# Patient Record
Sex: MALE | Race: WHITE | NOT HISPANIC OR LATINO | Employment: OTHER | URBAN - METROPOLITAN AREA
[De-identification: names, ages, dates, MRNs, and addresses within clinical notes are randomized per-mention and may not be internally consistent; named-entity substitution may affect disease eponyms.]

---

## 2017-01-03 ENCOUNTER — TELEPHONE (OUTPATIENT)
Dept: ELECTROPHYSIOLOGY | Facility: CLINIC | Age: 82
End: 2017-01-03

## 2017-01-03 NOTE — TELEPHONE ENCOUNTER
----- Message from Ann Crews sent at 12/28/2016  4:00 PM CST -----  Contact: Daughter of pt called   Daughter of pt called(Prema), requesting to be seen on a Friday. She states she will like to speak with you at ph [phone]. Thank you

## 2017-01-04 ENCOUNTER — TELEPHONE (OUTPATIENT)
Dept: ELECTROPHYSIOLOGY | Facility: CLINIC | Age: 82
End: 2017-01-04

## 2017-01-04 NOTE — TELEPHONE ENCOUNTER
----- Message from Ann Crews sent at 1/4/2017 11:27 AM CST -----  Contact: Daughter of pt  Daughter of pt called(Prema), requesting to be seen on a Friday. She states she will like to speak with you at ph [phone]. She states it is an annual and will like to speak with you.Thank you

## 2017-01-04 NOTE — TELEPHONE ENCOUNTER
I spoke with pt'a daughter, Prema, and informed her that Dr. Green will no longer be in clinic at this location on a Friday.  I offered an appt with VALENTINA Brand and she said that she had to check with her mother and when they were ready they will call back to reschedule.

## 2017-01-27 ENCOUNTER — OFFICE VISIT (OUTPATIENT)
Dept: INTERNAL MEDICINE | Facility: CLINIC | Age: 82
End: 2017-01-27
Payer: COMMERCIAL

## 2017-01-27 ENCOUNTER — CLINICAL SUPPORT (OUTPATIENT)
Dept: INTERNAL MEDICINE | Facility: CLINIC | Age: 82
End: 2017-01-27
Payer: COMMERCIAL

## 2017-01-27 VITALS
WEIGHT: 156.75 LBS | SYSTOLIC BLOOD PRESSURE: 122 MMHG | DIASTOLIC BLOOD PRESSURE: 84 MMHG | BODY MASS INDEX: 23.22 KG/M2 | HEIGHT: 69 IN | HEART RATE: 108 BPM

## 2017-01-27 DIAGNOSIS — E53.8 B12 DEFICIENCY: Primary | ICD-10-CM

## 2017-01-27 DIAGNOSIS — I49.5 SICK SINUS SYNDROME: ICD-10-CM

## 2017-01-27 DIAGNOSIS — Z00.00 ANNUAL PHYSICAL EXAM: ICD-10-CM

## 2017-01-27 DIAGNOSIS — I48.91 ATRIAL FIBRILLATION, UNSPECIFIED TYPE: Primary | ICD-10-CM

## 2017-01-27 PROCEDURE — 99999 PR PBB SHADOW E&M-EST. PATIENT-LVL IV: CPT | Mod: PBBFAC,,, | Performed by: FAMILY MEDICINE

## 2017-01-27 PROCEDURE — 96372 THER/PROPH/DIAG INJ SC/IM: CPT | Mod: S$GLB,,, | Performed by: FAMILY MEDICINE

## 2017-01-27 PROCEDURE — 99397 PER PM REEVAL EST PAT 65+ YR: CPT | Mod: 25,S$GLB,, | Performed by: FAMILY MEDICINE

## 2017-01-27 RX ORDER — CYANOCOBALAMIN 1000 UG/ML
1000 INJECTION, SOLUTION INTRAMUSCULAR; SUBCUTANEOUS
Status: DISCONTINUED | OUTPATIENT
Start: 2017-01-27 | End: 2017-03-21 | Stop reason: SDUPTHER

## 2017-01-27 RX ADMIN — CYANOCOBALAMIN 1000 MCG: 1000 INJECTION, SOLUTION INTRAMUSCULAR; SUBCUTANEOUS at 01:01

## 2017-01-27 NOTE — MR AVS SNAPSHOT
[facility]  [address]  [facility] [address]  Phone: [phone]  Fax: [phone]                  Migue Fraser   2017 11:00 AM   Office Visit    Description:  Male : 1925   Provider:  Iggy Telles MD   Department:  [facility]           Reason for Visit     Follow-up           Diagnoses this Visit        Comments    Atrial fibrillation, unspecified type    -  Primary     Sick sinus syndrome         Annual physical exam                To Do List           Future Appointments        Provider Department Dept Phone    2/10/2017 10:45 AM [facility]ENT[facility] [phone]      Goals (5 Years of Data)     None      Follow-Up and Disposition     Return in about 6 months (around 2017) for f/u.      [facility]     [facility] On [facility] Nurse Care Line -  Assistance  Registered nurses in the [facility] Center provide clinical advisement, health education, appointment booking, and other advisory services.  Call for this free service at [phone].             Medications           Message regarding Medications     Verify the changes and/or additions to your medication regime listed below are the same as discussed with your clinician today.  If any of these changes or additions are incorrect, please notify your healthcare provider.             Verify that the below list of medications is an accurate representation of the medications you are currently taking.  If none reported, the list may be blank. If incorrect, please contact your healthcare provider. Carry this list with you in case of emergency.           Current Medications     apixaban 5 mg Tab Take 1 tablet (5 mg total) by mouth 2 (two) times daily.    baclofen (LIORESAL) 10 MG tablet take 1/2 tablet by mouth three times a day if needed for muscle spasm    escitalopram oxalate (LEXAPRO) 10 MG tablet Take one tablet daily    escitalopram oxalate  "(LEXAPRO) 20 MG tablet Take 1 tablet (20 mg total) by mouth once daily.    fluticasone (FLONASE) 50 mcg/actuation nasal spray 1 spray by Nasal route once daily.    latanoprost 0.005 % ophthalmic solution 1 drop every evening.    metoprolol succinate (TOPROL-XL) 25 MG 24 hr tablet Take 25 mg by mouth once daily.     timolol maleate 0.5% (TIMOPTIC) 0.5 % Drop Place 1 drop into both eyes 2 (two) times daily.    tramadol (ULTRAM) 50 mg tablet Take 50 mg by mouth every 6 (six) hours as needed.           Clinical Reference Information           Vital Signs - Last Recorded  Most recent update: 1/27/2017 11:18 AM by Ruiz Newton MA    BP Pulse Ht Wt BMI    122/84 108 5' 9" (1.753 m) 71.1 kg (156 lb 12 oz) 23.15 kg/m2      Blood Pressure          Most Recent Value    BP  122/84      Allergies as of 1/27/2017     Penicillins      Immunizations Administered on Date of Encounter - 1/27/2017     None      Orders Placed During Today's Visit      Normal Orders This Visit    Ambulatory consult to Electrophysiology     Future Labs/Procedures Expected by Expires    CBC auto differential  1/27/2017 1/27/2018    Comprehensive metabolic panel  1/27/2017 1/27/2018    Vitamin B12  1/27/2017 3/28/2018      [facility] Sign-Up     Activating your [facility] account is as easy as 1-2-3!     1) Visit [email or web address], select Sign Up Now, enter this activation code and your date of birth, then select Next.  [email or web address]  Expires: 3/13/2017 11:38 AM      2) Create a username and password to use when you visit [facility] in the future and select a security question in case you lose your password and select Next.    3) Enter your e-mail address and click Sign Up!    Additional Information  If you have questions, please e-mail [email or web address] or call [phone] to talk to our [facility] staff. Remember, [facility] is NOT to be used for urgent needs. For medical emergencies, dial 911.         "

## 2017-01-27 NOTE — PROGRESS NOTES
Subjective:       Patient ID: Migue Fraser is a 89 y.o. male.    Chief Complaint: Follow-up    HPI Comments: Patient is here for follow-up of their chronic diseases, see last note for details  Doing well, no complaints    Review of Systems   Constitutional: Negative for chills and fatigue.   HENT: Negative for ear pain.    Eyes: Negative for pain.   Respiratory: Negative for chest tightness.    Cardiovascular: Negative for chest pain.   Gastrointestinal: Negative for abdominal pain.   Genitourinary: Negative for flank pain.   Musculoskeletal: Negative for gait problem.   Neurological: Negative for syncope.   Psychiatric/Behavioral: Negative for behavioral problems.       Objective:      Physical Exam   Constitutional: He appears well-developed.   HENT:   Head: Normocephalic and atraumatic.   Eyes: EOM are normal.   Neck: Neck supple.   Cardiovascular: Normal rate.    ireg ireg   Pulmonary/Chest: Breath sounds normal. No respiratory distress. He has no wheezes. He has no rales.   Abdominal: Soft.   Musculoskeletal: He exhibits no edema.   Neurological: He is alert.   Skin: Skin is dry.   Psychiatric: His behavior is normal.       Assessment:       1. Atrial fibrillation, unspecified type    2. Sick sinus syndrome    3. Annual physical exam        Plan:       Migue was seen today for follow-up.    Diagnoses and all orders for this visit:    Atrial fibrillation, unspecified type  -     Ambulatory consult to Electrophysiology - f/u with them every Feb + prn  -     CBC auto differential; Future    Sick sinus syndrome  -     Ambulatory consult to Electrophysiology    Annual physical exam  -     CBC auto differential; Future  -     Comprehensive metabolic panel; Future  -     Vitamin B12; Future    F/u q 6 mo with me

## 2017-02-10 ENCOUNTER — OFFICE VISIT (OUTPATIENT)
Dept: NEUROLOGY | Facility: CLINIC | Age: 82
End: 2017-02-10
Payer: COMMERCIAL

## 2017-02-10 ENCOUNTER — TELEPHONE (OUTPATIENT)
Dept: NEUROLOGY | Facility: CLINIC | Age: 82
End: 2017-02-10

## 2017-02-10 VITALS
DIASTOLIC BLOOD PRESSURE: 68 MMHG | BODY MASS INDEX: 23.09 KG/M2 | HEIGHT: 69 IN | WEIGHT: 155.88 LBS | SYSTOLIC BLOOD PRESSURE: 132 MMHG | HEART RATE: 47 BPM

## 2017-02-10 DIAGNOSIS — F01.518 VASCULAR DEMENTIA WITH BEHAVIOR DISTURBANCE: Primary | ICD-10-CM

## 2017-02-10 PROCEDURE — 99999 PR PBB SHADOW E&M-EST. PATIENT-LVL III: CPT | Mod: PBBFAC,,, | Performed by: NURSE PRACTITIONER

## 2017-02-10 PROCEDURE — 99215 OFFICE O/P EST HI 40 MIN: CPT | Mod: S$GLB,,, | Performed by: NURSE PRACTITIONER

## 2017-02-10 PROCEDURE — 1159F MED LIST DOCD IN RCRD: CPT | Mod: S$GLB,,, | Performed by: NURSE PRACTITIONER

## 2017-02-10 PROCEDURE — 1157F ADVNC CARE PLAN IN RCRD: CPT | Mod: S$GLB,,, | Performed by: NURSE PRACTITIONER

## 2017-02-10 PROCEDURE — 1126F AMNT PAIN NOTED NONE PRSNT: CPT | Mod: S$GLB,,, | Performed by: NURSE PRACTITIONER

## 2017-02-10 PROCEDURE — 1160F RVW MEDS BY RX/DR IN RCRD: CPT | Mod: S$GLB,,, | Performed by: NURSE PRACTITIONER

## 2017-02-10 NOTE — PROGRESS NOTES
Name: Migue Fraser  MRN: [id number]   CSN: [id number]      Date: 02/10/2017    Referring physician:  Iggy Telles MD  [address] [place] [address]    Chief Complaint / Interval History: memory    History of Present Illness (HPI):    89 yo male with dementia, possible vascular dementia, last seen in office by Dr. Dumont, accompanied by wife and daughter.     Sleep is good  Appetite is good. No sisues swallowing or choking.   When asked about his mood, states he is very happy and thinks his memory is pretty good. He says he thinks he still works. Wife shakes her head that he does not still work.     Lexapro did help hypersexual thoughts. Still occurs but not as often. He is no longer waking her up. She has hired a sitter 3 days/week - stays 4 hours at a time. Has been very helpful for his wife! She is doing much better, not as fatigued. She is not well herself. Daughters are also coming in a day per week when sitter is not there. He will occasionally urinate on the floor. Has refused Depends. Will occasionally need some direction on how to get to room in his home.   Will dress himself but not always appropriate. Came down the other day and said he knew something was missing because he was cold. He had shirt and underwear on but no pants. He is able to feed himself.     Nonmotor/Premotor ROS:  Dysphagia (HENT)?No  RBD/sleep issues (Constitutional)?No  Depression/anxiety (Psychiatric)?No  Fatigue (Constitutional)?No    Falls (Musculoskeletal)?No  Cognitive impairment (Neurologic)?Yes  Psychoses (Psychiatric)?No  Pain/Paresthesia (Neurologic)?No      Past Medical History: The patient  has a past medical history of A-fib; Atrial fibrillation; Blastomycosis; Glaucoma; and Sick sinus syndrome.    Social History: The patient  reports that he has never smoked. He does not have any smokeless tobacco history on file. He reports that he drinks alcohol.    Family History: Their family history is not on  "file.    Allergies: Penicillins     Meds:   Current Outpatient Prescriptions on File Prior to Visit   Medication Sig Dispense Refill    apixaban 5 mg Tab Take 1 tablet (5 mg total) by mouth 2 (two) times daily. 180 tablet 3    baclofen (LIORESAL) 10 MG tablet take 1/2 tablet by mouth three times a day if needed for muscle spasm 15 tablet 0    escitalopram oxalate (LEXAPRO) 20 MG tablet Take 1 tablet (20 mg total) by mouth once daily. 30 tablet 11    fluticasone (FLONASE) 50 mcg/actuation nasal spray 1 spray by Nasal route once daily.      latanoprost 0.005 % ophthalmic solution 1 drop every evening.      metoprolol succinate (TOPROL-XL) 25 MG 24 hr tablet Take 25 mg by mouth once daily.   0    timolol maleate 0.5% (TIMOPTIC) 0.5 % Drop Place 1 drop into both eyes 2 (two) times daily.  1    tramadol (ULTRAM) 50 mg tablet Take 50 mg by mouth every 6 (six) hours as needed.  0    escitalopram oxalate (LEXAPRO) 10 MG tablet Take one tablet daily 1 tablet 11     Current Facility-Administered Medications on File Prior to Visit   Medication Dose Route Frequency Provider Last Rate Last Dose    cyanocobalamin injection 1,000 mcg  1,000 mcg Intramuscular Q30 Days Leslie Perdomo NP   1,000 mcg at 12/02/16 1022    cyanocobalamin injection 1,000 mcg  1,000 mcg Intramuscular Q30 Days Iggy Telles MD   1,000 mcg at 01/27/17 1306       Exam:  Visit Vitals    /68 (BP Location: Left arm, Patient Position: Sitting, BP Method: Automatic)    Pulse (!) 47    Ht 5' 9" (1.753 m)    Wt 70.7 kg (155 lb 13.8 oz)    BMI 23.02 kg/m2       Constitutional  Well-developed, well-nourished, appears stated age. Smiles throughout visit. Calm, cooperative.    Neurological    * Mental status  MOCA = deferred     - Orientation  Oriented to person,wife and daughter.      - Memory   Attempts to talk about his time as a . 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 He is quite talkative today but " conversation often will detour to a different topic entirely.      - Attention/concentration  Attentive  during exam   * Gait  WC   * Specialized movement exam  No hypophonic speech.    No facial masking.   No cogwheel rigidity.     No bradykinesia.   No tremor with rest, posture, kinesis, or intention.    No other dystonia, chorea, athetosis, myoclonus, or tics.       Laboratory/Radiological:  - Results:  Lab Visit on 01/27/2017   Component Date Value Ref Range Status    WBC 01/27/2017 4.26  3.90 - 12.70 K/uL Final    RBC 01/27/2017 3.72* 4.60 - 6.20 M/uL Final    Hemoglobin 01/27/2017 12.9* 14.0 - 18.0 g/dL Final    Hematocrit 01/27/2017 39.1* 40.0 - 54.0 % Final    MCV 01/27/2017 105* 82 - 98 fL Final    MCH 01/27/2017 34.7* 27.0 - 31.0 pg Final    MCHC 01/27/2017 33.0  32.0 - 36.0 % Final    RDW 01/27/2017 13.0  11.5 - 14.5 % Final    Platelets 01/27/2017 158  150 - 350 K/uL Final    MPV 01/27/2017 10.4  9.2 - 12.9 fL Final    Gran # 01/27/2017 2.3  1.8 - 7.7 K/uL Final    Lymph # 01/27/2017 1.4  1.0 - 4.8 K/uL Final    Mono # 01/27/2017 0.5  0.3 - 1.0 K/uL Final    Eos # 01/27/2017 0.1  0.0 - 0.5 K/uL Final    Baso # 01/27/2017 0.03  0.00 - 0.20 K/uL Final    Gran% 01/27/2017 52.8  38.0 - 73.0 % Final    Lymph% 01/27/2017 32.2  18.0 - 48.0 % Final    Mono% 01/27/2017 10.8  4.0 - 15.0 % Final    Eosinophil% 01/27/2017 3.3  0.0 - 8.0 % Final    Basophil% 01/27/2017 0.7  0.0 - 1.9 % Final    Differential Method 01/27/2017 Automated   Final    Sodium 01/27/2017 140  136 - 145 mmol/L Final    Potassium 01/27/2017 4.6  3.5 - 5.1 mmol/L Final    Chloride 01/27/2017 102  95 - 110 mmol/L Final    CO2 01/27/2017 29  23 - 29 mmol/L Final    Glucose 01/27/2017 101  70 - 110 mg/dL Final    BUN, Bld 01/27/2017 16  10 - 30 mg/dL Final    Creatinine 01/27/2017 1.0  0.5 - 1.4 mg/dL Final    Calcium 01/27/2017 9.3  8.7 - 10.5 mg/dL Final    Total Protein 01/27/2017 7.0  6.0 - 8.4 g/dL Final     Albumin 01/27/2017 4.2  3.5 - 5.2 g/dL Final    Total Bilirubin 01/27/2017 0.7  0.1 - 1.0 mg/dL Final    Alkaline Phosphatase 01/27/2017 60  55 - 135 U/L Final    AST 01/27/2017 17  10 - 40 U/L Final    ALT 01/27/2017 12  10 - 44 U/L Final    Anion Gap 01/27/2017 9  8 - 16 mmol/L Final    eGFR if African American 01/27/2017 >60  >60 mL/min/1.73 m^2 Final    eGFR if non African American 01/27/2017 >60  >60 mL/min/1.73 m^2 Final    Vitamin B-12 01/27/2017 >2000* 210 - 950 pg/mL Final       Diagnoses:          Dementia, vascular- does have some features of AD as well    Medical Decision Making:    For now, will continue meds without change.   Watch libido! If this increases or becomes problematic, wife will call.   Will have to be cautious with any meds due to age.   Call for problems.  Follow up 4 months. Requests Friday appt.     I spent 40  minutes face-to-face with the patient, daughter and wife with >80% of the time spent with counseling and education regarding:  - results of data, diagnosis, and recommendations stated above  - the prognosis of dementia and common occurrences and behaviors in this type of process      Leslie Perdomo, DNP, NP-C  Division of Movement and Memory Disorders  [facility]  [phone]

## 2017-02-10 NOTE — LETTER
February 12, 2017      Iggy Telles MD  [address]  [facility] [address]           [facility]  [address]  [facility] [address]  Phone: [phone]  Fax: [phone]          Patient: Migue Fraser   MR Number: [id number]   YOB: 1925   Date of Visit: 2/10/2017       Dear Dr. Iggy Telles:    Thank you for referring Migue Fraser to me for evaluation. Attached you will find relevant portions of my assessment and plan of care.    If you have questions, please do not hesitate to call me. I look forward to following Migue Fraser along with you.    Sincerely,    Leslie Perdomo NP    Enclosure  CC:  No Recipients    If you would like to receive this communication electronically, please contact [email or web address] or [phone] to request more information on [employer] Link access.    For providers and/or their staff who would like to refer a patient to [facility], please contact us through our one-stop-shop provider referral line, [facility], at [phone].    If you feel you have received this communication in error or would no longer like to receive these types of communications, please e-mail [email or web address]

## 2017-02-16 DIAGNOSIS — I48.91 ATRIAL FIBRILLATION, UNSPECIFIED TYPE: Primary | ICD-10-CM

## 2017-02-16 RX ORDER — METOPROLOL SUCCINATE 25 MG/1
25 TABLET, EXTENDED RELEASE ORAL DAILY
Qty: 90 TABLET | Refills: 3 | Status: ON HOLD | OUTPATIENT
Start: 2017-02-16 | End: 2017-12-19 | Stop reason: ALTCHOICE

## 2017-02-24 ENCOUNTER — CLINICAL SUPPORT (OUTPATIENT)
Dept: INTERNAL MEDICINE | Facility: CLINIC | Age: 82
End: 2017-02-24
Payer: COMMERCIAL

## 2017-02-24 PROCEDURE — 96372 THER/PROPH/DIAG INJ SC/IM: CPT | Mod: S$GLB,,, | Performed by: FAMILY MEDICINE

## 2017-02-24 RX ADMIN — CYANOCOBALAMIN 1000 MCG: 1000 INJECTION, SOLUTION INTRAMUSCULAR; SUBCUTANEOUS at 11:02

## 2017-03-03 DIAGNOSIS — I48.91 ATRIAL FIBRILLATION, UNSPECIFIED TYPE: Primary | ICD-10-CM

## 2017-03-03 NOTE — TELEPHONE ENCOUNTER
Nurse called patients daughter and informed her that we will call in scrip to [email or web address]. Nurse called [facility] and spoke with Katherin santamaria and they took refill order over the phone - eliquis 5 mg po bid disp 180 with three refills.    ----- Message from Arpita Gonzalez sent at 3/3/2017 11:03 AM CST -----  Contact: pt's daughter- Prema  Pt is at [facility] @ [address] trying to get the Eliquis 5mg refilled because the pt is out of the medication.  The pharmacy says the rx is  and they need a new prescription. The pt's daughter can be reached at [phone].  Dr. KAYODE Green's pt and LOV 16. Thanks!!

## 2017-03-08 DIAGNOSIS — I48.91 ATRIAL FIBRILLATION, UNSPECIFIED TYPE: Primary | ICD-10-CM

## 2017-03-10 ENCOUNTER — HOSPITAL ENCOUNTER (OUTPATIENT)
Dept: CARDIOLOGY | Facility: CLINIC | Age: 82
Discharge: HOME | End: 2017-03-10
Payer: COMMERCIAL

## 2017-03-10 ENCOUNTER — OFFICE VISIT (OUTPATIENT)
Dept: ELECTROPHYSIOLOGY | Facility: CLINIC | Age: 82
End: 2017-03-10
Payer: COMMERCIAL

## 2017-03-10 ENCOUNTER — HOSPITAL ENCOUNTER (INPATIENT)
Facility: HOSPITAL | Age: 82
LOS: 11 days | Discharge: HOME HEALTH | DRG: 083 | End: 2017-03-22
Attending: EMERGENCY MEDICINE | Admitting: HOSPITALIST
Payer: COMMERCIAL

## 2017-03-10 VITALS
BODY MASS INDEX: 22.28 KG/M2 | HEIGHT: 70 IN | HEART RATE: 72 BPM | WEIGHT: 155.63 LBS | DIASTOLIC BLOOD PRESSURE: 62 MMHG | SYSTOLIC BLOOD PRESSURE: 120 MMHG

## 2017-03-10 DIAGNOSIS — S06.2X0A: ICD-10-CM

## 2017-03-10 DIAGNOSIS — S06.2X0D: ICD-10-CM

## 2017-03-10 DIAGNOSIS — S06.6X1D: ICD-10-CM

## 2017-03-10 DIAGNOSIS — I48.0 PAROXYSMAL ATRIAL FIBRILLATION: ICD-10-CM

## 2017-03-10 DIAGNOSIS — R53.81 DEBILITY: ICD-10-CM

## 2017-03-10 DIAGNOSIS — I60.9 SAH (SUBARACHNOID HEMORRHAGE): Primary | ICD-10-CM

## 2017-03-10 DIAGNOSIS — I48.91 ATRIAL FIBRILLATION, UNSPECIFIED TYPE: ICD-10-CM

## 2017-03-10 DIAGNOSIS — R45.1 AGITATION REQUIRING SEDATION PROTOCOL: ICD-10-CM

## 2017-03-10 DIAGNOSIS — M79.651 RIGHT THIGH PAIN: ICD-10-CM

## 2017-03-10 DIAGNOSIS — M25.561 RIGHT KNEE PAIN, UNSPECIFIED CHRONICITY: ICD-10-CM

## 2017-03-10 DIAGNOSIS — I49.5 SICK SINUS SYNDROME: Primary | ICD-10-CM

## 2017-03-10 DIAGNOSIS — S06.6X9A: ICD-10-CM

## 2017-03-10 DIAGNOSIS — S06.339A: ICD-10-CM

## 2017-03-10 DIAGNOSIS — I35.9 NONRHEUMATIC AORTIC VALVE DISORDER: ICD-10-CM

## 2017-03-10 DIAGNOSIS — Z79.01 CURRENT USE OF LONG TERM ANTICOAGULATION: ICD-10-CM

## 2017-03-10 PROCEDURE — 36000 PLACE NEEDLE IN VEIN: CPT

## 2017-03-10 PROCEDURE — 99214 OFFICE O/P EST MOD 30 MIN: CPT | Mod: S$GLB,,, | Performed by: NURSE PRACTITIONER

## 2017-03-10 PROCEDURE — 99999 PR PBB SHADOW E&M-EST. PATIENT-LVL III: CPT | Mod: PBBFAC,,, | Performed by: NURSE PRACTITIONER

## 2017-03-10 PROCEDURE — 1157F ADVNC CARE PLAN IN RCRD: CPT | Mod: S$GLB,,, | Performed by: NURSE PRACTITIONER

## 2017-03-10 PROCEDURE — 1160F RVW MEDS BY RX/DR IN RCRD: CPT | Mod: S$GLB,,, | Performed by: NURSE PRACTITIONER

## 2017-03-10 PROCEDURE — 99291 CRITICAL CARE FIRST HOUR: CPT | Mod: ,,, | Performed by: EMERGENCY MEDICINE

## 2017-03-10 PROCEDURE — 99285 EMERGENCY DEPT VISIT HI MDM: CPT | Mod: 25

## 2017-03-10 PROCEDURE — 93005 ELECTROCARDIOGRAM TRACING: CPT

## 2017-03-10 PROCEDURE — 1126F AMNT PAIN NOTED NONE PRSNT: CPT | Mod: S$GLB,,, | Performed by: NURSE PRACTITIONER

## 2017-03-10 PROCEDURE — 1159F MED LIST DOCD IN RCRD: CPT | Mod: S$GLB,,, | Performed by: NURSE PRACTITIONER

## 2017-03-10 PROCEDURE — 93000 ELECTROCARDIOGRAM COMPLETE: CPT | Mod: S$GLB,,, | Performed by: INTERNAL MEDICINE

## 2017-03-10 NOTE — IP AVS SNAPSHOT
[facility]  [address]  [facility] [address]  Phone: [phone]           Patient Discharge Instructions     Our goal is to set you up for success. This packet includes information on your condition, medications, and your home care. It will help you to care for yourself so you don't get sicker and need to go back to the hospital.     Please ask your nurse if you have any questions.        There are many details to remember when preparing to leave the hospital. Here is what you will need to do:    1. Take your medicine. If you are prescribed medications, review your Medication List in the following pages. You may have new medications to  at the pharmacy and others that you'll need to stop taking. Review the instructions for how and when to take your medications. Talk with your doctor or nurses if you are unsure of what to do.     2. Go to your follow-up appointments. Specific follow-up information is listed in the following pages. Your may be contacted by a transition nurse or clinical provider about future appointments. Be sure we have all of the phone numbers to reach you, if needed. Please contact your provider's office if you are unable to make an appointment.     3. Watch for warning signs. Your doctor or nurse will give you detailed warning signs to watch for and when to call for assistance. These instructions may also include educational information about your condition. If you experience any of warning signs to your health, call your doctor.               [facility]  Unless otherwise directed by your provider, please contact [facility], our nurse care line that is available for 24/7 assistance.     [phone] (toll-free)    Registered nurses in the [facility] provide clinical advisement, health education, appointment booking, and other advisory services.                    ** Verify the list of medication(s) below is accurate and up  to date. Carry this with you in case of emergency. If your medications have changed, please notify your healthcare provider.             Medication List      START taking these medications        Additional Info                      aspirin 81 MG Chew   Refills:  0   Dose:  81 mg    Instructions:  Take 1 tablet (81 mg total) by mouth once daily. Start taking this on the 3/27     Begin Date    AM    Noon    PM    Bedtime         CONTINUE taking these medications        Additional Info                      escitalopram oxalate 20 MG tablet   Commonly known as:  LEXAPRO   Quantity:  30 tablet   Refills:  11   Dose:  20 mg    Last time this was given:  20 mg on 3/22/2017  8:31 AM   Instructions:  Take 1 tablet (20 mg total) by mouth once daily.     Begin Date    AM    Noon    PM    Bedtime       latanoprost 0.005 % ophthalmic solution   Refills:  0   Dose:  1 drop    Last time this was given:  1 drop on 3/21/2017  9:51 PM   Instructions:  Place 1 drop into both eyes every evening.     Begin Date    AM    Noon    PM    Bedtime       metoprolol succinate 25 MG 24 hr tablet   Commonly known as:  TOPROL-XL   Quantity:  90 tablet   Refills:  3   Dose:  25 mg    Last time this was given:  25 mg on 3/22/2017  8:32 AM   Instructions:  Take 1 tablet (25 mg total) by mouth once daily.     Begin Date    AM    Noon    PM    Bedtime       timolol maleate 0.5% 0.5 % Drop   Commonly known as:  TIMOPTIC   Refills:  1   Dose:  1 drop    Last time this was given:  1 drop on 3/22/2017  8:31 AM   Instructions:  Place 1 drop into both eyes 2 (two) times daily.     Begin Date    AM    Noon    PM    Bedtime       tramadol 50 mg tablet   Commonly known as:  ULTRAM   Refills:  0   Dose:  50 mg    Instructions:  Take 50 mg by mouth every 6 (six) hours as needed for Pain.     Begin Date    AM    Noon    PM    Bedtime         STOP taking these medications     apixaban 5 mg Tab            Where to Get Your Medications      You can get these  medications from any pharmacy     You don't need a prescription for these medications     aspirin 81 MG Chew                  Please bring to all follow up appointments:    1. A copy of your discharge instructions.  2. All medicines you are currently taking in their original bottles.  3. Identification and insurance card.    Please arrive 15 minutes ahead of scheduled appointment time.    Please call 24 hours in advance if you must reschedule your appointment and/or time.        Your Scheduled Appointments     Mar 24, 2017  8:00 AM CDT   Hospital Follow Up with Iggy Telles MD   [facility] ([facility])    [address]  [place] [address]   [phone]            Mar 24, 2017 10:30 AM CDT   Nurse Visit with NURSE, [facility] INTERNAL [facility]IC[facility]   [facility] ([facility])    [address]  [place] [address]   [phone]            Mar 29, 2017 12:40 PM CDT   Ct Head Non Contrast with [facility] CT6 MOBILE LIMIT 450 LBS   [facility] ([facility] )    [address] [facility] [address]   [phone]            Mar 29, 2017  1:40 PM CDT   Established Patient Visit with [facility] ([facility] )    [address]  [place] [address]   [phone]              Follow-up Information     Go to Iggy Telles MD.    Specialty:  Family Medicine    Why:  Hospital follow up at 8 am     Contact information:    [address]  [place] [address]  [phone]        Referrals     Future Orders    Ambulatory referral to Outpatient Case Management     Questions:    Does the patient have a chronic or uncontrolled disease process?:      Does the patient have a new diagnosis of a catastrophic or life altering illness/treatment?:      Does the patient have any psycho-social issues that may affect their ability to adhere to treatment  "plan?:      Does patient have any behaviors or circumstances that may impede ability to adhere to treatment plan?:      Is patient at risk for admission/readmission?:          Discharge Instructions     Future Orders    HOSPITAL BED FOR HOME USE     Questions:    Type:  Electric    Length of need (1-99 months):  99    Does patient have medical equipment at home?:  cane, straight    walker, rolling    Height:  5' 9" (1.753 m)    Weight:  76 kg (167 lb 8.8 oz)    Accessories:  Gel overlay mattress    Please check all that apply:  Patient requires positioning of the body in ways not feasible in an ordinary bed due to a medical condition which is expected to last at least one month.    Vendor:  [facility] Comment - [facility] to deliver, orders given to     Expected Date of Delivery:  3/16/2017    Expected Time of Delivery:          Primary Diagnosis     Your primary diagnosis was:  Traumatic Subarachnoid Hematoma With Loss Of Consciousness      Admission Information     Date & Time Provider Department CSN    3/10/2017 11:20 PM Alex Delgado MD [facility] [id number]      Care Providers     Provider Role Specialty Primary office phone    Alex Delgado MD Attending Provider Hospitalist [phone]    María Malloy MD Consulting Physician  Hospitalist [phone]    Alex Delgado MD Team Attending  Hospitalist [phone]      Your Vitals Were     BP Pulse Temp Resp Height Weight    121/63 (BP Location: Left arm, Patient Position: Sitting, BP Method: Automatic) 67 98.2 °F (36.8 °C) (Axillary) 16 5' 9" (1.753 m) 76 kg (167 lb 8.8 oz)    SpO2 BMI             95% 24.74 kg/m2         Recent Lab Values        3/11/2017                           2:38 AM           A1C 6.0           Comment for A1C at  2:38 AM on 3/11/2017:  According to ADA guidelines, hemoglobin A1C <7.0% represents  optimal control in non-pregnant diabetic patients.  Different  metrics may apply to specific populations. "   Standards of Medical Care in Diabetes - 2016.  For the purpose of screening for the presence of diabetes:  <5.7%     Consistent with the absence of diabetes  5.7-6.4%  Consistent with increasing risk for diabetes   (prediabetes)  >or=6.5%  Consistent with diabetes  Currently no consensus exists for use of hemoglobin A1C  for diagnosis of diabetes for children.        Allergies as of 3/22/2017        Reactions    Penicillins       Advance Directives     An advance directive is a document which, in the event you are no longer able to make decisions for yourself, tells your healthcare team what kind of treatment you do or do not want to receive, or who you would like to make those decisions for you.  If you do not currently have an advance directive, [facility] encourages you to create one.  For more information call:  [phone]), [phone] ([phone]),  or log on to [email or web address].        Language Assistance Services     ATTENTION: Language assistance services are available, free of charge. Please call [phone].      ATENCIÓN: Si habla español, tiene a dillon disposición servicios gratuitos de asistencia lingüística. Llame al [phone].     CHÚ Ý: N?u b?n nói Ti?ng Vi?t, có các d?ch v? h? tr? ngôn ng? mi?n phí dành cho b?n. G?i s? [phone].        Stroke Education              [facility] Sign-Up     Activating your [facility] account is as easy as 1-2-3!     1) Visit [email or web address], select Sign Up Now, enter this activation code and your date of birth, then select Next.  [email or web address]  Expires: 5/6/2017 10:47 AM      2) Create a username and password to use when you visit [facility] in the future and select a security question in case you lose your password and select Next.    3) Enter your e-mail address and click Sign Up!    Additional Information  If you have questions, please e-mail [email or web address] or call [phone] to talk to our [facility] staff. Remember,  [facility] is NOT to be used for urgent needs. For medical emergencies, dial 911.          [facility] complies with applicable Federal civil rights laws and does not discriminate on the basis of race, color, national origin, age, disability, or sex.

## 2017-03-10 NOTE — PROGRESS NOTES
Subjective:    Patient ID:  Migue Fraser is a 89 y.o. male who presents for follow-up of SSS and Atrial Fibrillation.     Mr. Fraser is a patient of Dr. Green.     HPI     I had the pleasure of seeing Migue Fraser in follow-up for his history of atrial fibrillation and sick sinus syndrome. He is a 89-year old  at [employer] [employer] with no significant past medical history who was reportedly started on Toprol XL 12.5 mg daily about 10 years ago prior to knee replacement surgery. He eventually started taking this medication every other day. In April of 2013, Mr. Fraser suffered a mechanical fall, resulting in a hand laceration. While rehabbing this injury, he was noted to have resting HRs in the low 40s bpm range. Beta-blockers were discontinued at that time, with an improvement in his heart rate to the high 50s.    In December of 2013, Mr. Fraser noted that his HR was in the 115s bpm range (he had not checked it for about 6 months). He presented to [facility], where he was found to be in AF w/RVR. He was initially seen in [facility] EP clinic in December of 2013, and was started on Toprol XL 12.5 mg PO QD and a PPM was recommended given his previous hx of bradycardia. He opted to stop BB therapy instead. He monitored his HR on a daily basis, and it remained in the 70-90s bpm range. He has remained on Eliquis for CVA prophylaxis.     At his last office visit, Mr. Fraser reported that apart from some chronic back/left hip pain, he had no specific complaints.     Since his last office visit, Mr. Fraser reports occasional mild fatigue, per baseline. He denies chest pain, SOB/GONCALVES, dizziness, palpitations, or syncope. He is here today with his daughter, Prema. He reports that he is only physically limited by hip/lower back pain.     Recent cardiac studies:  Echo (01/24/14) revealed an EF of 50-55%, biatrial enlargement (severe LAE; SURY measuring 48.25 cc/m2), LVDD, a mildly enlarged ascending  aorta, trivial AR, mild MR and TR.   24-Hour Holter (04/21/14) revealed AF w/ventricular rates varying between  bpm; average 83 bpm. There were occasional PVCs (totaling 362; averaging 15 per hour), 1 couplet, and no episodes of VT. The longest RR interval was 2015 msec.     I reviewed today's ECG which demonstrated AF at 72 bpm; QRS 74, and QTc 418.    Review of Systems   Constitution: Positive for malaise/fatigue. Negative for diaphoresis and weakness.   HENT: Negative for headaches and nosebleeds.    Eyes: Negative for double vision.   Cardiovascular: Negative for chest pain, dyspnea on exertion, irregular heartbeat, near-syncope, palpitations and syncope.   Respiratory: Negative for shortness of breath.    Skin: Negative.    Musculoskeletal: Positive for back pain, joint pain and stiffness.   Gastrointestinal: Negative for abdominal pain, hematemesis and hematochezia.   Genitourinary: Negative for hematuria.   Neurological: Negative for dizziness and light-headedness.   Psychiatric/Behavioral: Negative for altered mental status.        Objective:    Physical Exam   Constitutional: He is oriented to person, place, and time. He appears well-developed and well-nourished.   HENT:   Head: Normocephalic and atraumatic.   Eyes: Pupils are equal, round, and reactive to light.   Neck: Normal range of motion.   Cardiovascular: Normal rate, regular rhythm, normal heart sounds and intact distal pulses.    Pulmonary/Chest: Effort normal and breath sounds normal.   Abdominal: Soft.   Musculoskeletal:   Mr. Fraser is in a wheelchar today.    Neurological: He is alert and oriented to person, place, and time.   Vitals reviewed.        Assessment:       1. Sick sinus syndrome    2. Atrial fibrillation, unspecified type    3. Current use of long term anticoagulation         Plan:       In summary, Mr. Fraser is a 89 y.o. male with SSS and AF. Mr. Fraser is doing well from a rhythm perspective with asymptomatic AF;  rate-controlled on Toprol-XL and anticoagulated on Eliquis.    24-Hour Holter now to evaluate HR given hx of SSS/braduycardia and hx of AF w/RVR.  Continue current medication regimen.   Follow up in clinic in 1 year, sooner as needed.     JUNIOR Francisco, APRN, FNP-C    (A copy of today's note was sent to Sheyla Telles and Norma).

## 2017-03-11 PROBLEM — I60.9 SAH (SUBARACHNOID HEMORRHAGE): Status: RESOLVED | Noted: 2017-03-11 | Resolved: 2017-03-11

## 2017-03-11 PROBLEM — I60.9 SAH (SUBARACHNOID HEMORRHAGE): Status: ACTIVE | Noted: 2017-03-11

## 2017-03-11 PROBLEM — R79.1 ABNORMAL COAGULATION PROFILE: Status: ACTIVE | Noted: 2017-03-11

## 2017-03-11 PROBLEM — S06.2X0A: Status: ACTIVE | Noted: 2017-03-11

## 2017-03-11 PROBLEM — S06.6X9A TRAUMATIC SUBARACHNOID HEMATOMA WITH LOSS OF CONSCIOUSNESS: Status: ACTIVE | Noted: 2017-03-11

## 2017-03-11 LAB
ABO + RH BLD: NORMAL
ALBUMIN SERPL BCP-MCNC: 3.8 G/DL
ALBUMIN SERPL BCP-MCNC: 4 G/DL
ALP SERPL-CCNC: 63 U/L
ALP SERPL-CCNC: 65 U/L
ALT SERPL W/O P-5'-P-CCNC: 10 U/L
ALT SERPL W/O P-5'-P-CCNC: 11 U/L
ANION GAP SERPL CALC-SCNC: 9 MMOL/L
ANION GAP SERPL CALC-SCNC: 9 MMOL/L
ANISOCYTOSIS BLD QL SMEAR: SLIGHT
AST SERPL-CCNC: 17 U/L
AST SERPL-CCNC: 19 U/L
BASOPHILS # BLD AUTO: 0.03 K/UL
BASOPHILS # BLD AUTO: 0.03 K/UL
BASOPHILS NFR BLD: 0.6 %
BASOPHILS NFR BLD: 0.6 %
BILIRUB SERPL-MCNC: 1.1 MG/DL
BILIRUB SERPL-MCNC: 1.2 MG/DL
BLD GP AB SCN CELLS X3 SERPL QL: NORMAL
BUN SERPL-MCNC: 15 MG/DL
BUN SERPL-MCNC: 16 MG/DL
CALCIUM SERPL-MCNC: 9.3 MG/DL
CALCIUM SERPL-MCNC: 9.4 MG/DL
CHLORIDE SERPL-SCNC: 102 MMOL/L
CHLORIDE SERPL-SCNC: 104 MMOL/L
CHOLEST/HDLC SERPL: 3.6 {RATIO}
CO2 SERPL-SCNC: 26 MMOL/L
CO2 SERPL-SCNC: 28 MMOL/L
CREAT SERPL-MCNC: 0.8 MG/DL
CREAT SERPL-MCNC: 0.9 MG/DL
DIFFERENTIAL METHOD: ABNORMAL
DIFFERENTIAL METHOD: ABNORMAL
EOSINOPHIL # BLD AUTO: 0.1 K/UL
EOSINOPHIL # BLD AUTO: 0.1 K/UL
EOSINOPHIL NFR BLD: 2.6 %
EOSINOPHIL NFR BLD: 2.6 %
ERYTHROCYTE [DISTWIDTH] IN BLOOD BY AUTOMATED COUNT: 13.4 %
ERYTHROCYTE [DISTWIDTH] IN BLOOD BY AUTOMATED COUNT: 13.6 %
EST. GFR  (AFRICAN AMERICAN): >60 ML/MIN/1.73 M^2
EST. GFR  (AFRICAN AMERICAN): >60 ML/MIN/1.73 M^2
EST. GFR  (NON AFRICAN AMERICAN): >60 ML/MIN/1.73 M^2
EST. GFR  (NON AFRICAN AMERICAN): >60 ML/MIN/1.73 M^2
ESTIMATED AVG GLUCOSE: 126 MG/DL
FOLATE SERPL-MCNC: 5 NG/ML
GLUCOSE SERPL-MCNC: 101 MG/DL
GLUCOSE SERPL-MCNC: 96 MG/DL
HBA1C MFR BLD HPLC: 6 %
HCT VFR BLD AUTO: 38.6 %
HCT VFR BLD AUTO: 40.1 %
HDL/CHOLESTEROL RATIO: 27.6 %
HDLC SERPL-MCNC: 170 MG/DL
HDLC SERPL-MCNC: 47 MG/DL
HGB BLD-MCNC: 13.4 G/DL
HGB BLD-MCNC: 13.5 G/DL
INR PPP: 1.1
LDLC SERPL CALC-MCNC: 108.8 MG/DL
LYMPHOCYTES # BLD AUTO: 1.3 K/UL
LYMPHOCYTES # BLD AUTO: 1.5 K/UL
LYMPHOCYTES NFR BLD: 23.8 %
LYMPHOCYTES NFR BLD: 28.1 %
MAGNESIUM SERPL-MCNC: 1.9 MG/DL
MCH RBC QN AUTO: 35.3 PG
MCH RBC QN AUTO: 35.4 PG
MCHC RBC AUTO-ENTMCNC: 33.4 %
MCHC RBC AUTO-ENTMCNC: 35 %
MCV RBC AUTO: 101 FL
MCV RBC AUTO: 106 FL
MONOCYTES # BLD AUTO: 0.4 K/UL
MONOCYTES # BLD AUTO: 0.6 K/UL
MONOCYTES NFR BLD: 10.9 %
MONOCYTES NFR BLD: 6.4 %
NEUTROPHILS # BLD AUTO: 3.4 K/UL
NEUTROPHILS # BLD AUTO: 3.4 K/UL
NEUTROPHILS NFR BLD: 61.9 %
NEUTROPHILS NFR BLD: 62.3 %
NONHDLC SERPL-MCNC: 123 MG/DL
PHOSPHATE SERPL-MCNC: 3 MG/DL
PLATELET # BLD AUTO: 140 K/UL
PLATELET # BLD AUTO: 147 K/UL
PLATELET BLD QL SMEAR: ABNORMAL
PMV BLD AUTO: 10 FL
PMV BLD AUTO: 9.7 FL
POCT GLUCOSE: 103 MG/DL (ref 70–110)
POCT GLUCOSE: 117 MG/DL (ref 70–110)
POCT GLUCOSE: 124 MG/DL (ref 70–110)
POTASSIUM SERPL-SCNC: 4 MMOL/L
POTASSIUM SERPL-SCNC: 4.2 MMOL/L
PROT SERPL-MCNC: 6.7 G/DL
PROT SERPL-MCNC: 6.8 G/DL
PROTHROMBIN TIME: 11.9 SEC
RBC # BLD AUTO: 3.8 M/UL
RBC # BLD AUTO: 3.81 M/UL
SODIUM SERPL-SCNC: 139 MMOL/L
SODIUM SERPL-SCNC: 139 MMOL/L
TRIGL SERPL-MCNC: 71 MG/DL
TROPONIN I SERPL DL<=0.01 NG/ML-MCNC: 0.01 NG/ML
TSH SERPL DL<=0.005 MIU/L-ACNC: 1.04 UIU/ML
VIT B12 SERPL-MCNC: 491 PG/ML
WBC # BLD AUTO: 5.41 K/UL
WBC # BLD AUTO: 5.44 K/UL

## 2017-03-11 PROCEDURE — 80053 COMPREHEN METABOLIC PANEL: CPT

## 2017-03-11 PROCEDURE — 97166 OT EVAL MOD COMPLEX 45 MIN: CPT

## 2017-03-11 PROCEDURE — 80061 LIPID PANEL: CPT

## 2017-03-11 PROCEDURE — 93010 ELECTROCARDIOGRAM REPORT: CPT | Mod: ,,, | Performed by: INTERNAL MEDICINE

## 2017-03-11 PROCEDURE — 82746 ASSAY OF FOLIC ACID SERUM: CPT

## 2017-03-11 PROCEDURE — 86900 BLOOD TYPING SEROLOGIC ABO: CPT

## 2017-03-11 PROCEDURE — 93306 TTE W/DOPPLER COMPLETE: CPT | Mod: 26,,, | Performed by: INTERNAL MEDICINE

## 2017-03-11 PROCEDURE — G8997 SWALLOW GOAL STATUS: HCPCS | Mod: CJ

## 2017-03-11 PROCEDURE — 85610 PROTHROMBIN TIME: CPT

## 2017-03-11 PROCEDURE — 84484 ASSAY OF TROPONIN QUANT: CPT

## 2017-03-11 PROCEDURE — 85025 COMPLETE CBC W/AUTO DIFF WBC: CPT

## 2017-03-11 PROCEDURE — 97530 THERAPEUTIC ACTIVITIES: CPT

## 2017-03-11 PROCEDURE — 84443 ASSAY THYROID STIM HORMONE: CPT

## 2017-03-11 PROCEDURE — 97162 PT EVAL MOD COMPLEX 30 MIN: CPT

## 2017-03-11 PROCEDURE — 25000003 PHARM REV CODE 250: Performed by: ANESTHESIOLOGY

## 2017-03-11 PROCEDURE — 83735 ASSAY OF MAGNESIUM: CPT

## 2017-03-11 PROCEDURE — 93306 TTE W/DOPPLER COMPLETE: CPT

## 2017-03-11 PROCEDURE — 63600531 PHARM REV CODE 636 NO ALT 250 W HCPCS: Performed by: ANESTHESIOLOGY

## 2017-03-11 PROCEDURE — 92610 EVALUATE SWALLOWING FUNCTION: CPT

## 2017-03-11 PROCEDURE — 25000003 PHARM REV CODE 250: Performed by: PHYSICIAN ASSISTANT

## 2017-03-11 PROCEDURE — 80053 COMPREHEN METABOLIC PANEL: CPT | Mod: 91

## 2017-03-11 PROCEDURE — C9132 KCENTRA, PER I.U.: HCPCS | Performed by: ANESTHESIOLOGY

## 2017-03-11 PROCEDURE — 20000000 HC ICU ROOM

## 2017-03-11 PROCEDURE — 99233 SBSQ HOSP IP/OBS HIGH 50: CPT | Mod: ,,, | Performed by: ANESTHESIOLOGY

## 2017-03-11 PROCEDURE — G8996 SWALLOW CURRENT STATUS: HCPCS | Mod: CN

## 2017-03-11 PROCEDURE — 83036 HEMOGLOBIN GLYCOSYLATED A1C: CPT

## 2017-03-11 PROCEDURE — 84100 ASSAY OF PHOSPHORUS: CPT

## 2017-03-11 PROCEDURE — 86901 BLOOD TYPING SEROLOGIC RH(D): CPT

## 2017-03-11 PROCEDURE — 82607 VITAMIN B-12: CPT

## 2017-03-11 PROCEDURE — 97802 MEDICAL NUTRITION INDIV IN: CPT

## 2017-03-11 RX ORDER — DEXMEDETOMIDINE HYDROCHLORIDE 4 UG/ML
0.2 INJECTION, SOLUTION INTRAVENOUS CONTINUOUS
Status: DISCONTINUED | OUTPATIENT
Start: 2017-03-11 | End: 2017-03-11

## 2017-03-11 RX ORDER — SODIUM CHLORIDE 0.9 % (FLUSH) 0.9 %
3 SYRINGE (ML) INJECTION EVERY 8 HOURS
Status: DISCONTINUED | OUTPATIENT
Start: 2017-03-11 | End: 2017-03-22 | Stop reason: HOSPADM

## 2017-03-11 RX ORDER — DEXMEDETOMIDINE HYDROCHLORIDE 4 UG/ML
0.2 INJECTION, SOLUTION INTRAVENOUS CONTINUOUS
Status: DISCONTINUED | OUTPATIENT
Start: 2017-03-11 | End: 2017-03-13

## 2017-03-11 RX ORDER — LABETALOL HYDROCHLORIDE 5 MG/ML
10 INJECTION, SOLUTION INTRAVENOUS
Status: DISCONTINUED | OUTPATIENT
Start: 2017-03-11 | End: 2017-03-14

## 2017-03-11 RX ORDER — DEXMEDETOMIDINE HYDROCHLORIDE 4 UG/ML
INJECTION, SOLUTION INTRAVENOUS
Status: DISCONTINUED
Start: 2017-03-11 | End: 2017-03-11

## 2017-03-11 RX ORDER — FAMOTIDINE 10 MG/ML
20 INJECTION INTRAVENOUS 2 TIMES DAILY
Status: DISCONTINUED | OUTPATIENT
Start: 2017-03-11 | End: 2017-03-13

## 2017-03-11 RX ORDER — ONDANSETRON 2 MG/ML
4 INJECTION INTRAMUSCULAR; INTRAVENOUS EVERY 12 HOURS PRN
Status: DISCONTINUED | OUTPATIENT
Start: 2017-03-11 | End: 2017-03-22 | Stop reason: HOSPADM

## 2017-03-11 RX ADMIN — FAMOTIDINE 20 MG: 10 INJECTION, SOLUTION INTRAVENOUS at 08:03

## 2017-03-11 RX ADMIN — Medication 3 ML: at 02:03

## 2017-03-11 RX ADMIN — Medication 3 ML: at 08:03

## 2017-03-11 RX ADMIN — SODIUM CHLORIDE 500 ML: 0.9 INJECTION, SOLUTION INTRAVENOUS at 07:03

## 2017-03-11 RX ADMIN — PROTHROMBIN, COAGULATION FACTOR VII HUMAN, COAGULATION FACTOR IX HUMAN, COAGULATION FACTOR X HUMAN, PROTEIN C, PROTEIN S HUMAN, AND WATER 1882.5 UNITS: KIT at 04:03

## 2017-03-11 RX ADMIN — DEXMEDETOMIDINE HYDROCHLORIDE 0.6 MCG/KG/HR: 4 INJECTION, SOLUTION INTRAVENOUS at 06:03

## 2017-03-11 RX ADMIN — FAMOTIDINE 20 MG: 10 INJECTION, SOLUTION INTRAVENOUS at 10:03

## 2017-03-11 RX ADMIN — Medication 3 ML: at 05:03

## 2017-03-11 NOTE — PT/OT/SLP EVAL
Occupational Therapy  Evaluation    Migue Fraser   MRN: [id number]   Admitting Diagnosis: Traumatic subarachnoid hematoma with loss of consciousness    OT Date of Treatment: 03/11/17   OT Start Time: 0849  OT Stop Time: 0913  OT Total Time (min): 24 min    Billable Minutes:  Evaluation 14  Therapeutic Activity 10    Diagnosis: SAH   S/p fall at home; contusion to R and L side of brain    Past Medical History:   Diagnosis Date    A-fib     Atrial fibrillation     Blastomycosis     Cancer     prostate CA    Glaucoma     Sick sinus syndrome       Past Surgical History:   Procedure Laterality Date    APPENDECTOMY      CHOLECYSTECTOMY      HERNIA REPAIR      JOINT REPLACEMENT         Referring physician: Dain  Date referred to OT: 3/11/2017    General Precautions: Standard, aspiration, fall, hearing impaired, NPO  Orthopedic Precautions: N/A  Braces: N/A     Patient History:  Living Environment  Living Environment Comment: Pt lives with spouse in 2 story raised home in [place], [place]. Home has ~6 [address] with B rail. Pt's bed/bath are on 2nd floor; there is no full bathroom located on 1st floor. Prior to admit, pt used a straight cane for mobility and stayed mainly at home during the day. Wife (Rebekah) assisted with set up and supervision of all ADLS/self care and feeding task. Reported that pt was able to complete dressing well, but could not sequence for task (ie: under shirt might be on top of his shirt). (information provided by wife and step-son)  Equipment Currently Used at Home: cane, straight (owns but does not use RW)    Prior level of function:   Bed Mobility/Transfers: needs device and assist  Grooming: needs assist  Bathing: needs assist  Upper Body Dressing: needs assist  Lower Body Dressing: needs assist  Toileting: needs assist  Homemaking Responsibilities: No  Driving License: No  Occupation: Retired  Type of Occupation:  at [employer]   Dominant hand: right    Subjective:  Communicated  "with RN prior to session. Pt to join in evaluation.   Pt's step son and spouse at bed side.  Patient/Family stated goals: "Knowing he will have to go somewhere before back home take the burden off of me" -spouse; when discussing d/c planning of needing more therapy     Pain Ratin/10 (no indications)  Pain Rating Post-Intervention: 0/10    Objective:  Patient found with: blood pressure cuff, peripheral IV, pulse ox (continuous), restraints, SCD, telemetry (Holter Monitor)    Cognitive Exam:  Oriented to: stated "Dipak Hummel" with laugh when asked for name; able to state wife's name with added time  Follows Commands/attention: Inattentive and Easily distracted  Memory:  Dementia  Safety awareness/insight to disability: impaired  Coping skills/emotional control: Inappropriate     Visual/perceptual:  Impaired  R inattention noted with EOB activity     Physical Exam:  Postural examination/scapula alignment: Rounded shoulder, Head forward and Posterior pelvic tilt  Skin integrity: Bruising of L periorbital region; Skin thin; skin tear to L 5th digit  Edema: None noted     Sensation:   Unable to assess 2/2 cognition     Upper Extremity Range of Motion:  Right Upper Extremity: WFL  Left Upper Extremity: WFL    Upper Extremity Strength:  Unable to complete MMT 2/2 cognition; 4-/5 with functional tasks    Fine motor coordination:   Unable to complete testing or assessment 2/2 poor attention     Gross motor coordination: WFL for B UE    Functional Mobility:  Bed Mobility:  Rolling/Turning Right:  (found in R side laying)  Scooting/Bridging: Total Assistance  Supine to Sit: Maximum Assistance (R lateral lean)  Sit to Supine: Maximum Assistance    Transfers:  Sit <> Stand Assistance: Moderate Assistance (x4 trials from EOB)  Sit <> Stand Assistive Device: Rolling Walker    Activities of Daily Living:  Feeding Level of Assistance: Activity did not occur  UE Dressing Level of Assistance: Maximum assistance (2/2 cognition and " "poor divided attention)  LE Dressing Level of Assistance: Total assistance (sitting EOB to don B socks)  Grooming Position: Seated, EOB  Grooming Level of Assistance: Total assistance (for wiping R elbow (blood noted))    Balance:   Static Sit: Total(a) initially 2/2 R lateral lean and posterior lean; CGA at times; total(a) when presented with task or with fail in attention  Dynamic Sit: Total(a) 2/2 impaired attention and poor postural control; unable to take challenges without LOB  Static Stand: Mod(a) with RW for best safety     Additional:  -Pt and family edu on OT role in care and POC for acute stay; Pt provided with verbal daily orientation  -Edu on delirium precautions and need for lights on/blinds up and stimulation in day for best orientation for time  -Completed EOB activity ~10 minutes with noted hyper-attention to L side and inattention to R  -Communication board updated    AM-PAC 6 CLICK ADL  How much help from another person does this patient currently need?  1 = Unable, Total/Dependent Assistance  2 = A lot, Maximum/Moderate Assistance  3 = A little, Minimum/Contact Guard/Supervision  4 = None, Modified [place]/Independent    Putting on and taking off regular lower body clothing? : 2  Bathing (including washing, rinsing, drying)?: 2  Toileting, which includes using toilet, bedpan, or urinal? : 2  Putting on and taking off regular upper body clothing?: 2  Taking care of personal grooming such as brushing teeth?: 2  Eating meals?: 2  Total Score: 12    AM-PAC Raw Score CMS "G-Code Modifier Level of Impairment Assistance   6 % Total / Unable   7 - 9 CM 80 - 100% Maximal Assist   10 - 14 CL 60 - 80% Moderate Assist   15 - 19 CK 40 - 60% Moderate Assist   20 - 22 CJ 20 - 40% Minimal Assist   23 CI 1-20% SBA / CGA   24 CH 0% Independent/ Mod I       Patient left HOB elevated with all lines intact, call button in reach, restraints reapplied at end of session, RN notified and family " present    Assessment:  Migue Fraser is a 89 y.o. male with a medical diagnosis of Traumatic subarachnoid hematoma with loss of consciousness s/p unwitnessed fall at home (outside). PT with contusion to R and L brain. Presents with contusion to L periorbital region and L 5th digit. Pt was baseline dementia and requires assistance and supervision with all task at baseline. He demo increased cognitive deficits, inattention and need for added assistance with bed mobility, ADLs, self care and transfers (sit to stand) on this date. He is not safe to return to [facility] home at this time. He will cont to benefit from skilled OT services at this time to address the following areas of concern:    Rehab identified problem list/impairments: Rehab identified problem list/impairments: weakness, impaired endurance, impaired self care skills, impaired functional mobilty, gait instability, impaired balance, impaired cognition, decreased upper extremity function, decreased lower extremity function, impaired cardiopulmonary response to activity, impaired skin    Rehab potential is fair.    Activity tolerance: Fair    Discharge recommendations: Discharge Facility/Level Of Care Needs: nursing facility, skilled     Barriers to discharge: Barriers to Discharge: Inaccessible home environment, Decreased caregiver support    Equipment recommendations: bedside commode, bath bench, wheelchair, hospital bed     GOALS:   Occupational Therapy Goals        Problem: Occupational Therapy Goal    Goal Priority Disciplines Outcome Interventions   Occupational Therapy Goal     OT, PT/OT Ongoing (interventions implemented as appropriate)    Description:  Goals to be met by: 3/25     Patient will increase functional independence with ADLs by performing:    UE Dressing while seated EOB with Set-up Assistance and Minimal Assistance.  LE Dressing while seated EOB with Set-up Assistance and Contact Guard Assistance.  Grooming while seated at sink  with Set-up Assistance and Minimal Assistance.  Toileting from bedside commode with Minimal Assistance for hygiene and clothing management.   Sitting at edge of bed x10 minutes for dynamic functional task with Contact Guard Assistance.  Supine to sit with Contact Guard Assistance.  Toilet transfer to bedside commode with Contact Guard Assistance.  CG demo understanding for positioning and bed mobility.                 PLAN:  Patient to be seen 4 x/week to address the above listed problems via self-care/home management, therapeutic exercises, therapeutic activities, neuromuscular re-education, cognitive retraining, sensory integration  Plan of Care expires: 04/10/17  Plan of Care reviewed with: patient, spouse (step-son)    CLARE Bustos  03/11/2017

## 2017-03-11 NOTE — PROGRESS NOTES
At 5:20 patient became more anxious, was trying to get out of bed, was trying to kikk staff. [facility] was notified. Telephone order read back to order soft non-violent  restraints for patient and start a precedex infusion. Will continue to monitor.

## 2017-03-11 NOTE — ED PROVIDER NOTES
Encounter Date: 3/10/2017    SCRIBE #1 NOTE: I, Darline Kan, am scribing for, and in the presence of,  Dr. Sanchez. I have scribed the entire note.       History     Chief Complaint   Patient presents with    Fall     fall, hit head, hematoma takes blood thinners. non wittnessed fall, pt with hx dementia.      Review of patient's allergies indicates:   Allergen Reactions    Penicillins      HPI Comments: Time patient was seen by the provider: 11:46 PM      The patient is a 89 y.o. male with hx of: A-fib, cancer, and dementia that presents to the ED with family complaining of an unwitnessed fall which occurred sometime earlier today. He was found down in the backyard by his son after patient apparently walked out of the back door where there are steps, fell, and hit his head. It is unclear whether he lost consciousness. He has ecchymosis to his left eye brow as well as abrasions to bilateral hands. Patient has dementia and difficulty hearing at baseline, so family is unsure how he fell or why he went outside. They state that he has not been complaining of headache or dizziness and note that he has been in his usual state of health recently. Patient denies headache, any pain, dizziness, or nausea. He does not remember falling or hitting his head today. Patient is on Eliquis.       The history is provided by the patient.     Past Medical History:   Diagnosis Date    A-fib     Atrial fibrillation     Blastomycosis     Cancer     prostate CA    Glaucoma     Sick sinus syndrome      Past Surgical History:   Procedure Laterality Date    APPENDECTOMY      CHOLECYSTECTOMY      HERNIA REPAIR      JOINT REPLACEMENT       No family history on file.  Social History   Substance Use Topics    Smoking status: Never Smoker    Smokeless tobacco: None    Alcohol use Yes      Comment: 1 drink a night     Review of Systems   Constitutional: Negative for fever.   HENT: Negative for sinus pressure and sore throat.     Eyes: Negative for pain.   Respiratory: Negative for shortness of breath.    Cardiovascular: Negative for chest pain.   Gastrointestinal: Negative for nausea.   Musculoskeletal: Negative for back pain and neck pain.   Skin: Positive for wound (left eyebrow).   Neurological: Negative for dizziness and headaches. Syncope: unclear.   Psychiatric/Behavioral: Positive for confusion (secondary to dementia).       Physical Exam   Initial Vitals   BP Pulse Resp Temp SpO2   03/10/17 1858 03/10/17 1858 03/10/17 1858 03/10/17 1858 03/10/17 1858   155/92 50 18 98.2 °F (36.8 °C) 98 %     Physical Exam    Nursing note and vitals reviewed.  Constitutional: He appears well-developed and well-nourished. He is not diaphoretic. No distress.   HENT:   Head: Normocephalic and atraumatic.   Mouth/Throat: Oropharynx is clear and moist.   Eyes:   Mild conjunctival pallor.    Cardiovascular: Normal rate, normal heart sounds and intact distal pulses. An irregular rhythm present.   No murmur heard.  Pulmonary/Chest: Breath sounds normal. No respiratory distress. He has no wheezes. He has no rhonchi. He has no rales.   Abdominal: Soft. Bowel sounds are normal. He exhibits no distension. There is no tenderness. There is no rebound and no guarding.   Musculoskeletal: Normal range of motion. He exhibits no edema or tenderness.   Neurological: He is alert. He has normal strength. No cranial nerve deficit or sensory deficit.   Oriented x1, confused   Skin:   Periorbital contusion and left eyebrow with ecchymosis noted. Superficial abrasion/skin avulsion with mild bleeding.    Psychiatric: He has a normal mood and affect.         ED Course   Critical Care  Date/Time: 3/11/2017 3:05 AM  Performed by: ROSA DUGAN  Authorized by: ROSA DUGAN   Direct patient critical care time: 15 minutes  Additional history critical care time: 5 minutes  Ordering / reviewing critical care time: 5 minutes  Documentation critical care time: 5 minutes  Consulting  other physicians critical care time: 5 minutes  Total critical care time (exclusive of procedural time) : 35 minutes  Comments: Critical Care time: 30 minutes inclusive of direct patient care, review of previous records, interpretation of labs, imaging and ekg, as well as discussion of my impression and plan of care with the patient, family and other clinicians/consultants. This time is exclusive of any separate billable procedures and of treating other patients. Critical care was required for this patient who presented with SAH, on blood thinners requiring my emergent evaluation and management in the emergency department.  t        Labs Reviewed   CBC W/ AUTO DIFFERENTIAL - Abnormal; Notable for the following:        Result Value    RBC 3.80 (*)     Hemoglobin 13.4 (*)      (*)     MCH 35.3 (*)     Platelets 140 (*)     Platelet Estimate Decreased (*)     All other components within normal limits   COMPREHENSIVE METABOLIC PANEL - Abnormal; Notable for the following:     Total Bilirubin 1.1 (*)     All other components within normal limits   CBC W/ AUTO DIFFERENTIAL - Abnormal; Notable for the following:     RBC 3.81 (*)     Hemoglobin 13.5 (*)     Hematocrit 38.6 (*)      (*)     MCH 35.4 (*)     Platelets 147 (*)     All other components within normal limits   TROPONIN I   COMPREHENSIVE METABOLIC PANEL   LIPID PANEL   HEMOGLOBIN A1C   TSH   MAGNESIUM   PHOSPHORUS   TYPE & SCREEN   POCT GLUCOSE MONITORING CONTINUOUS             Medical Decision Making:   History:   Old Medical Records: I decided to obtain old medical records.  Initial Assessment:   This is an emergent evaluation of a 89 y.o.male patient presenting s/p fall with cerebral contusion and subarachnoid hemorrhage.   Plan: admit to neuro critical care, possible FFP    Clinical Tests:   Lab Tests: Ordered and Reviewed  Radiological Study: Ordered and Reviewed  Medical Tests: Ordered and Reviewed  Other:   I have discussed this case with  another health care provider.            Scribe Attestation:   Scribe #1: I performed the above scribed service and the documentation accurately describes the services I performed. I attest to the accuracy of the note.    Attending Attestation:           Physician Attestation for Scribe:  Physician Attestation Statement for Scribe #1: I, Dr. Sanchez, reviewed documentation, as scribed by Darline Kan in my presence, and it is both accurate and complete.         Attending ED Notes:   11:57 PM   Discussed with neuro critical care. They will come down to evaluate and admit the patient. Discussed FFP administration and they state that they will determine that after review of scans and eval.          ED Course     Clinical Impression:   The primary encounter diagnosis was SAH (subarachnoid hemorrhage). A diagnosis of Cerebral contusion, unspecified laterality, with loss of consciousness of unspecified duration, initial encounter was also pertinent to this visit.    Disposition:   Disposition: Admitted  Condition: Serious         Mik Sanchez MD  03/11/17 0306

## 2017-03-11 NOTE — PLAN OF CARE
Problem: Physical Therapy Goal  Goal: Physical Therapy Goal  PT goals for the next 10 visits    1. Pt supine to sit with minimal assist-not met  2. Pt sit to supine with CGA-not met  3. Pt sit to stand with RW with CGA-not met  4. Pt to perform gait 100ft with RW with minimal assist.-not met  5. Pt to transfer bed to/from bedside chair with RW with minimal assist.-not met  6. Pt to up/down 6 steps with B UE rails with minimal assist.-not met  7. Pt to perform B LE exs in sitting or supine x 20 reps with family assist.-not met  Outcome: Ongoing (interventions implemented as appropriate)  Pt's goals set and pt will benefit from skilled PT services to work towards improved functional mobility including: bed mobility, transfers, up/down steps, and gait.  Denae David PT 3/11/17

## 2017-03-11 NOTE — PROGRESS NOTES
Pt arrived to [facility] via stretcher from ED. Hooked up to cardiac monitoring. [facility] team notified. Son at bedside

## 2017-03-11 NOTE — H&P
History & Physical  Neurocritical Care    Admit Date: 3/10/2017  LOS: 0    Code Status: Full Code     CC: Traumatic SAH    SUBJECTIVE:     History of Present Illness: 89 yo M w pmhx/o SSS and A-fib (on Elaquis), prostate cancer, and dementia who was experienced an unwitnessed fall at home earlier today. Pt was found down in his back yard by his son. CT scan demonstrated multiple parenchymal hemorrhages with superimposed SAH. Pt has dementia at baseline but according to son is markedly more confused than usual.         Past Medical History:   Diagnosis Date    A-fib     Atrial fibrillation     Blastomycosis     Cancer     prostate CA    Glaucoma     Sick sinus syndrome      Past Surgical History:   Procedure Laterality Date    APPENDECTOMY      CHOLECYSTECTOMY      HERNIA REPAIR      JOINT REPLACEMENT       Current Facility-Administered Medications on File Prior to Encounter   Medication Dose Route Frequency Provider Last Rate Last Dose    cyanocobalamin injection 1,000 mcg  1,000 mcg Intramuscular Q30 Days Leslie Perdomo NP   1,000 mcg at 12/02/16 1022    cyanocobalamin injection 1,000 mcg  1,000 mcg Intramuscular Q30 Days Iggy Telles MD   1,000 mcg at 02/24/17 1102     Current Outpatient Prescriptions on File Prior to Encounter   Medication Sig Dispense Refill    apixaban 5 mg Tab Take 1 tablet (5 mg total) by mouth 2 (two) times daily. 180 tablet 3    escitalopram oxalate (LEXAPRO) 20 MG tablet Take 1 tablet (20 mg total) by mouth once daily. 30 tablet 11    latanoprost 0.005 % ophthalmic solution 1 drop every evening.      metoprolol succinate (TOPROL-XL) 25 MG 24 hr tablet Take 1 tablet (25 mg total) by mouth once daily. 90 tablet 3    timolol maleate 0.5% (TIMOPTIC) 0.5 % Drop Place 1 drop into both eyes 2 (two) times daily.  1    tramadol (ULTRAM) 50 mg tablet Take 50 mg by mouth every 6 (six) hours as needed.  0    fluticasone (FLONASE) 50 mcg/actuation nasal spray 1 spray by  Nasal route once daily.       Review of patient's allergies indicates:   Allergen Reactions    Penicillins      No family history on file.  Social History   Substance Use Topics    Smoking status: Never Smoker    Smokeless tobacco: None    Alcohol use Yes      Comment: 1 drink a night      Review of Symptoms:  Constitutional: Denies fevers, weight loss, chills, or weakness.  Eyes: Denies changes in vision.  ENT: Denies dysphagia, nasal discharge, ear pain or discharge.  Cardiovascular: Denies chest pain, palpitations, orthopnea, or claudication.  Respiratory: Denies shortness of breath, cough, hemoptysis, or wheezing.  GI: Denies nausea/vomitting, hematochezia, melena, abd pain, or changes in appetite.  : Denies dysuria, incontinence, or hematuria.  Musculoskeletal: Denies joint pain or myalgias.   Skin/breast: Denies rashes, lumps, lesions, or discharge. +L eye laceration  Neurologic: Denies dizziness, vertigo, or paresthesias. +H/A  Psychiatric: Denies changes in mood or hallucinations.  Endocrine: Denies polyuria, polydipsia, heat/cold intolerance.  Hematologic/Lymph: Denies lymphadenopathy.  Allergic/Immunologic: Denies rash, rhinitis.     OBJECTIVE:   Vital Signs (Most Recent):   Temp: 98.2 °F (36.8 °C) (03/10/17 1858)  Pulse: 72 (03/11/17 0208)  Resp: (!) 22 (03/11/17 0208)  BP: (!) 128/91 (03/11/17 0202)  SpO2: 95 % (03/11/17 0208)    Vital Signs (24h Range):   Temp:  [98.2 °F (36.8 °C)] 98.2 °F (36.8 °C)  Pulse:  [50-98] 72  Resp:  [14-22] 22  SpO2:  [95 %-98 %] 95 %  BP: (120-158)/(62-94) 128/91    ICP/CPP (Last 24h):        I & O (Last 24h):  No intake or output data in the 24 hours ending 03/11/17 0221  Physical Exam:  GA: Alert, comfortable, no acute distress.   HEENT: No scleral icterus or JVD.   Pulmonary: Clear to auscultation A/P/L. No wheezing, crackles, or rhonchi.  Cardiac: RRR S1 & S2 w/o rubs/murmurs/gallops.   Abdominal: Bowel sounds present x 4. No appreciable hepatosplenomegaly.  Skin:  No jaundice, rashes, or visible lesions.  Neuro:  --GCS: E4 V5 M6  --Mental Status:  AAOx1 (person only)  --CN II-XII grossly intact.   --Pupils 3mm, PERRL.   --Corneal reflex, gag, cough intact.  --LUE strength: 5/5  --RUE strength: 5/5  --LLE strength: 5/5  --RLE strength: 5/5    Vent Data:        Lines/Drains/Airway:          Nutrition/Tube Feeds:   Current Diet Order   Procedures    Diet NPO     No food intake until passes PRAVEEN or evaluated by speech.       Labs:  ABG: No results for input(s): PH, PO2, PCO2, HCO3, POCSATURATED, BE in the last 24 hours.  BMP:  Recent Labs  Lab 03/11/17  0008      K 4.0      CO2 28   BUN 16   CREATININE 0.9   GLU 96     LFT: Lab Results   Component Value Date    AST 17 03/11/2017    ALT 10 03/11/2017    ALKPHOS 63 03/11/2017    BILITOT 1.1 (H) 03/11/2017    ALBUMIN 4.0 03/11/2017    PROT 6.8 03/11/2017     CBC:   Lab Results   Component Value Date    WBC 5.44 03/11/2017    HGB 13.4 (L) 03/11/2017    HCT 40.1 03/11/2017     (H) 03/11/2017     (L) 03/11/2017     Microbiology x 7d:   Microbiology Results (last 7 days)     ** No results found for the last 168 hours. **        Imaging:  Motion limited exam.    Several areas of parenchymal contusion with superimposed component of subarachnoid hemorrhage, as above.  No evidence of significant mass effect, midline shift or hydrocephalus.  Continued followup is recommended.    Soft tissue swelling/hematoma overlying the left frontal calvarium.      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    Additional findings as above.      This report has been flagged in the [facility] medical record.        ______________________________________     Electronically signed by resident: CLYDE GALLAGHER MD  Date: 03/10/17    ASSESSMENT/PLAN:     Patient Active Problem List    Diagnosis Date Noted    SAH (subarachnoid  hemorrhage) 03/11/2017    Current use of long term anticoagulation 03/10/2017    Right knee pain 09/23/2016    Right thigh pain 07/26/2016    Anticoagulation management encounter 07/18/2014    Atrial fibrillation 01/03/2014    Sick sinus syndrome 01/03/2014     89 yo M with multiple SAH after fall on Elaquis.       Neuro: GCS 15, alert, oriented only to self. Markedly more confused than baseline dementia per son.   - admit to NCC  - Q1 neuro checks  - Kcentra 25 mcg/kg  - hold elaquis  - SBP <140        Pulmonary: no acute issues, O2 sats 99 on RA      Cardiac: HD stable not requiring antihypertensives.       Renal:  I/O's  - BUN/Cr      ID: no leukocytosis, afebrile      Hem/Onc: H/H stable, plts 147.  - on elaquis. Will give KCentra.       Endocrine:  BG stable       Fluids/Electrolytes/Nutrition/GI: NPO, MIVF at 100, replace lytes prn      Uninterrupted Critical Care/Counseling Time (not including procedures):  45 mins      Alan Gautam MD PGY-5  Anesthesiology/Neuro Critical Care

## 2017-03-11 NOTE — PT/OT/SLP EVAL
"Physical Therapy  Evaluation    Migue Fraser   MRN: [id number]   Admitting Diagnosis: <principal problem not specified>    PT Received On: 17  PT Start Time: 08     PT Stop Time: 913    PT Total Time (min): 17 min       Billable Minutes:  Evaluation 17  Moderate complexity eval  6 DAMION  Confusion/dementia  Instability/impaired balance  Weakness in B LE  Poor hearing  Requires max assist for bed mobility  Requires moderate assist for transfers with RW  Requires total assist for sitting balance    Diagnosis: <principal problem not specified>  S/p fall outside of his home. PT found to have SAH and cerebral contusion    Past Medical History:   Diagnosis Date    A-fib     Atrial fibrillation     Blastomycosis     Cancer     prostate CA    Glaucoma     Sick sinus syndrome       Past Surgical History:   Procedure Laterality Date    APPENDECTOMY      CHOLECYSTECTOMY      HERNIA REPAIR      JOINT REPLACEMENT     Referring physician: Dain  Date referred to PT: 3/11/17  General Precautions: Standard, aspiration, fall  Orthopedic Precautions: N/A   Braces: N/A       Do you have any cultural, spiritual, Oriental orthodox conflicts, given your current situation?: none noted    Patient History:  Lives With: spouse  Living Arrangements: house  Home Accessibility: stairs to enter home  Home Layout: Able to live on 1st floor  Number of Stairs to Enter Home: 6  Stair Railings at Home: outside, present at both sides  Equipment Currently Used at Home: walker, rolling, cane, straight    Previous Level of Function:  Ambulation Skills: needs device (uses SC for mobility)  Transfer Skills: needs device  ADL Skills: needs device    Subjective:  Communicated with nurse prior to session.  "I want to lie down"    Pain Ratin/10     Pain Rating Post-Intervention: 0/10    Objective:   Patient found with: peripheral IV, blood pressure cuff, pulse ox (continuous), telemetry, denis catheter, SCD     Cognitive Exam:  Oriented to: pt " "stated his wife's name and said "Dipak Hummel" when asked his name (wife states he jokes around a lot); unable to state place, time or situation    Follows Commands/attention: Follows one-step commands inconsistently  Communication: clear/fluent  Safety awareness/insight to disability: impaired    Physical Exam:  Postural examination/scapula alignment: Rounded shoulder, Head forward and Abnormal trunk flexion    Sensation: appears intact but unable to formally assess due to pt unable to follow commands of testing  Intact  light/touch B LE    Lower Extremity Range of Motion:  Right Lower Extremity: WFL  Left Lower Extremity: WFL    Lower Extremity Strength:  Right Lower Extremity: Deficits: hip flex 3-/5; knee flex/ext at least 3/5; ankle DF at least 3/5 (pt unable to follow commands to further test)  Left Lower Extremity: Deficits: hip flex 3-/5; knee flex/ext at least 3/5; ankle DF at least 3/5 (pt unable to follow commands to further test     Functional Mobility:  Bed Mobility:  Rolling/Turning Right: Moderate assistance  Supine to Sit: Maximum Assistance, With side rail (pt with R sided trunk lean upon sitting)  Sit to Supine: Maximum Assistance, With leg lift    Transfers:  Sit <> Stand Assistance: Moderate Assistance (4 trials from bed )  Sit <> Stand Assistive Device: Rolling Walker    Gait:   Gait Distance: Unable to assess due to pt wanted to lie down    Balance:   Static Sit: 0: Needs MAXIMAL assist to maintain sitting without back support  Dynamic Sit: FAIR: Cannot move trunk without losing balance  Static Stand: POOR: Needs MODERATE assist to maintain    Therapeutic Activities and Exercises:  Pt sat on the EOB ~ 10 min with CGA to total assist due to pt with R sided and posterior lean at times and attempted to lie down at times between standing trials. Pt stood with RW in front with moderate assist for ~ 30 sec each trial    AM-PAC 6 CLICK MOBILITY  How much help from another person does this patient " currently need?   1 = Unable, Total/Dependent Assistance  2 = A lot, Maximum/Moderate Assistance  3 = A little, Minimum/Contact Guard/Supervision  4 = None, Modified [place]/Independent    Turning over in bed (including adjusting bedclothes, sheets and blankets)?: 2  Sitting down on and standing up from a chair with arms (e.g., wheelchair, bedside commode, etc.): 2  Moving from lying on back to sitting on the side of the bed?: 2  Moving to and from a bed to a chair (including a wheelchair)?: 2  Need to walk in hospital room?: 1  Climbing 3-5 steps with a railing?: 1  Total Score: 10     AM-PAC Raw Score CMS G-Code Modifier Level of Impairment Assistance   6 % Total / Unable   7 - 9 CM 80 - 100% Maximal Assist   10 - 14 CL 60 - 80% Moderate Assist   15 - 19 CK 40 - 60% Moderate Assist   20 - 22 CJ 20 - 40% Minimal Assist   23 CI 1-20% SBA / CGA   24 CH 0% Independent/ Mod I     Patient left supine with all lines intact, call button in reach, nurse notified and family present.    Assessment:   Migue Fraser is a 89 y.o. male with a medical diagnosis of <principal problem not specified> and presents with difficulty with functional mobility due to weakness, instability, poor safety awareness, and fatigue. Pt can benefit from continued therapy to work towards improved mobility.    Rehab identified problem list/impairments: Rehab identified problem list/impairments: weakness, impaired endurance, impaired functional mobilty, impaired balance, decreased coordination, pain, decreased safety awareness, impaired cardiopulmonary response to activity    Rehab potential is good.    Activity tolerance: Good    Discharge recommendations: Discharge Facility/Level Of Care Needs: nursing facility, skilled     Barriers to discharge: Barriers to Discharge: Inaccessible home environment, Decreased caregiver support (6 DAMION and pt requires assist for mobility at this time)    Equipment recommendations: Equipment Needed  After Discharge: bedside commode, bath bench, wheelchair     GOALS:   Physical Therapy Goals        Problem: Physical Therapy Goal    Goal Priority Disciplines Outcome Goal Variances Interventions   Physical Therapy Goal     PT/OT, PT Ongoing (interventions implemented as appropriate)     Description:  PT goals for the next 10 visits    1. Pt supine to sit with minimal assist-not met  2. Pt sit to supine with CGA-not met  3. Pt sit to stand with RW with CGA-not met  4. Pt to perform gait 100ft with RW with minimal assist.-not met  5. Pt to transfer bed to/from bedside chair with RW with minimal assist.-not met  6. Pt to up/down 6 steps with B UE rails with minimal assist.-not met  7. Pt to perform B LE exs in sitting or supine x 20 reps with family assist.-not met            PLAN:    Patient to be seen 5 x/week to address the above listed problems via gait training, therapeutic activities, therapeutic exercises, neuromuscular re-education  Plan of Care expires: 04/11/17  Plan of Care reviewed with: patient, spouse    Denae David, PT  03/11/2017

## 2017-03-11 NOTE — CONSULTS
Consult Note  Neurosurgery    Consult Requested By: NCC  Reason for Consult: Head trauma    SUBJECTIVE:     History of Present Illness:  Patient is a 89 y.o. male with pmhx of afib on eliquis presents s/p fall with head trauma.  Fall was unwitnessed and he was found down by son.  Unclear how long he was down.  Pt has advanced dementia and history is limited.  He was given kaycentra d/t eliquis use.  He is currently on precedex for agitation further limiting assessment.    Scheduled Meds:   famotidine (PF)  20 mg Intravenous BID    sodium chloride 0.9%  3 mL Intravenous Q8H     Continuous Infusions:   dexmedetomidine (PRECEDEX) infusion 0.2 mcg/kg/hr (03/11/17 1300)     PRN Meds:labetalol, ondansetron    Review of patient's allergies indicates:   Allergen Reactions    Penicillins        Past Medical History:   Diagnosis Date    A-fib     Atrial fibrillation     Blastomycosis     Cancer     prostate CA    Glaucoma     Sick sinus syndrome      Past Surgical History:   Procedure Laterality Date    APPENDECTOMY      CHOLECYSTECTOMY      HERNIA REPAIR      JOINT REPLACEMENT       No family history on file.  Social History   Substance Use Topics    Smoking status: Never Smoker    Smokeless tobacco: None    Alcohol use Yes      Comment: 1 drink a night        Review of Systems:  See HPI    OBJECTIVE:     Vital Signs (Most Recent)  Temp: 97.4 °F (36.3 °C) (03/11/17 1100)  Pulse: 62 (03/11/17 1300)  Resp: 18 (03/11/17 1300)  BP: 107/74 (03/11/17 1300)  SpO2: 95 % (03/11/17 1300)    Vital Signs Range (Last 24H):  Temp:  [97.3 °F (36.3 °C)-98.2 °F (36.8 °C)]   Pulse:  []   Resp:  [12-28]   BP: ()/()   SpO2:  [92 %-98 %]     Physical Exam:  E1V3M5  PERRL  Localizing briskly in all 4s  SILT    Laboratory:  CBC:   Recent Labs  Lab 03/11/17  0238   WBC 5.41   RBC 3.81*   HGB 13.5*   HCT 38.6*   *   *   MCH 35.4*   MCHC 35.0     CMP:   Recent Labs  Lab 03/11/17  0238       CALCIUM 9.3   ALBUMIN 3.8   PROT 6.7      K 4.2   CO2 26      BUN 15   CREATININE 0.8   ALKPHOS 65   ALT 11   AST 19   BILITOT 1.2*     Coagulation:   Recent Labs  Lab 03/11/17  1239   INR 1.1     Cardiac markers:   Recent Labs  Lab 03/11/17  0008   TROPONINI 0.013     No results for input(s): COLORU, CLARITYU, SPECGRAV, PHUR, PROTEINUA, GLUCOSEU, BILIRUBINCON, BLOODU, WBCU, RBCU, BACTERIA, MUCUS, NITRITE, LEUKOCYTESUR, UROBILINOGEN, HYALINECASTS in the last 168 hours.      Diagnostic Results:  CT head: reviewed    ASSESSMENT/PLAN:     89 M on eliquis for afib who presents s/p fall with tSAH.  ICH of 2.  -q 1 hr neuro checks  -SBP less than 150  -Goal eunatremia  -Consider CT c spine d/t trauma  -Wean precedex as tolerated  -Maximal medical mgmt per NCC  -Will follow.

## 2017-03-11 NOTE — PT/OT/SLP EVAL
"Speech Language Pathology  Evaluation    Migue Fraser   MRN: [id number]   Admitting Diagnosis: <principal problem not specified>    Diet recommendations: Solid Diet Level: NPO  Liquid Diet Level: NPO     SLP Treatment Date: 17  Speech Start Time: 1002     Speech Stop Time: 1012     Speech Total (min): 10 min       TREATMENT BILLABLE MINUTES:  Eval Swallow and Oral Function 10    Diagnosis: <principal problem not specified>      Past Medical History:   Diagnosis Date    A-fib     Atrial fibrillation     Blastomycosis     Cancer     prostate CA    Glaucoma     Sick sinus syndrome      Past Surgical History:   Procedure Laterality Date    APPENDECTOMY      CHOLECYSTECTOMY      HERNIA REPAIR      JOINT REPLACEMENT         Has the patient been evaluated by SLP for swallowing? : Yes  Keep patient NPO?: Yes   General Precautions: Standard, aspiration          Social Hx: Patient lives with spouse    Prior diet: regular      Subjective:  "I dont want any"    Pain Ratin/10        Pain Rating Post-Intervention: 0/10    Objective:        Oral Musculature Evaluation  Oral Musculature: unable to assess due to poor participation/comprehension  Dentition: present and adequate  Mucosal Quality: good  Volitional Cough: not elicited  Volitional Swallow: not elicited  Voice Prior to PO Intake: wfl     Bedside Swallow Eval:  Consistencies Assessed: Thin liquids 1/4 teaspoon water presented multiple times however pt. kept lips pursed.    Oral Phase: not assessed  Pharyngeal Phase: not elicited      Assessment:  Migue Fraser is a 89 y.o. male with a medical diagnosis of <principal problem not specified> and presents with decreased alertness    Do you have any cultural, spiritual, [employer]alism conflicts, given your current situation?: no     Discharge recommendations: Discharge Facility/Level Of Care Needs: home     Goals:   SLP Goals        Problem: SLP Goal    Goal Priority Disciplines Outcome   SLP Goal    "  SLP Ongoing (interventions implemented as appropriate)   Description:  Speech Language Pathology Goals  Goals expected to be met by 3/18  1.  Pt. Will participate in speech language eval  2.  Pt. Will participate in ongoing assessment of swallow at bedside                   Plan:   Patient to be seen Therapy Frequency: 5 x/week   Plan of Care expires: 04/11/17  Plan of Care reviewed with: patient, spouse  SLP Follow-up?: Yes  SLP - Next Visit Date: 03/13/17           Leslie Pena MA, CCC-SLP  03/11/2017

## 2017-03-11 NOTE — ED TRIAGE NOTES
"Fall tonight outside off the stairs up to his house. Per family, "we don't know what time, his son found him outside on the floor around 6pm and he was bleeding from the head, and we called 911." per family pt has baseline dementia and oriented to name at baseline. At this time pt is oriented to name, arrives with a holter monitor in place from today with his cardiologist appointment. Pt takes eliquis, denies headache at this time. Has laceration to left forehead, with large bruise to forehead.    Family denies any new onset neurological change at this time.   "

## 2017-03-11 NOTE — PLAN OF CARE
Problem: SLP Goal  Goal: SLP Goal  Speech Language Pathology Goals  Goals expected to be met by 3/18  1. Pt. Will participate in speech language eval  2. Pt. Will participate in ongoing assessment of swallow at bedside  Outcome: Ongoing (interventions implemented as appropriate)  Recommend npo with aspiration precautions.  Pt. Somnolent, not maintaining alertness.  Leslie Pena MA/[facility]-SLP  Speech Language Pathologist  Pager [phone]  3/11/2017

## 2017-03-11 NOTE — PROGRESS NOTES
Called CT to see when they can take pt. Spoke with addis Goldstein in CT by ED, who stated that they won't be able to take pt until tonight most likely because of 7 STAT's  Ahead of my pt and 4 more routines. Will call when ready to scan pt.

## 2017-03-11 NOTE — CONSULTS
[facility]  Adult Nutrition  Consult Note    SUMMARY     Recommendations    Recommendation/Intervention:   1. Continue current diet order.   2. If PO intake <50%, recommend Boost Plus TID.   3. Encourge good PO intake of meals. Will monitor.   Goals: PO intake >50%.   Nutrition Goal Status: new  Communication of RD Recs: reviewed with RN    Reason for Assessment    Reason for Assessment: physician consult  Diagnosis: other (see comments) ([facility])  Relevent Medical History: prostate CA, dementia   Nutrition Discharge Planning: Adequate PO intake for optimal nutrition.     Nutrition Prescription Ordered    Current Diet Order: Regular    Nutrition/Diet History    Typical Food/Fluid Intake: Unable to speak with pt during visit. Pt started on oral diet early this AM.   Factors Affecting Nutritional Intake: other (see comments) ([facility])    Labs/Tests/Procedures/Meds    Pertinent Labs Reviewed: reviewed  Pertinent Medications Reviewed: reviewed  Pertinent Medications Comments: precedex    Physical Findings    Overall Physical Appearance: generalized wasting  Oral/Mouth Cavity: tooth/teeth missing  Skin: intact    Anthropometrics    Height (inches): 69.02 in  Weight Method: Bed Scale  Weight (kg): 73.4 kg  Ideal Body Weight (IBW), Male: 160.12 lb  % Ideal Body Weight, Male (lb): 101.06   BMI (kg/m2): 23.89  BMI Grade: 18.5-24.9 - normal    Estimated/Assessed Needs    Weight Used For Calorie Calculations: 73.4 kg (161 lb 13.1 oz)   Height (cm): 175.3 cm  Energy Need Method: [place]-St Jeor (1.2 PAL: 1663kcal)  RMR ([place]-St. Jeor Equation): 1386.17  Weight Used For Protein Calculations: 73.4 kg (161 lb 13.1 oz)  Protein Requirements: 58-73g (0.8-1g/kg)  Fluid Need Method: RDA Method (or per MD)     Monitor and Evaluation    Food and Nutrient Intake: energy intake, food and beverage intake  Food and Nutrient Adminstration: diet order  Anthropometric Measurements: weight, weight change  Biochemical Data,  Medical Tests and Procedures: other (specify) (All labs)  Nutrition-Focused Physical Findings: overall appearance    Nutrition Risk    Level of Risk: high    Nutrition Follow-Up    RD Follow-up?: Yes    Assessment and Plan    Unable to determine at this time.

## 2017-03-11 NOTE — PROGRESS NOTES
"KAYODE TIDWELL with NCC notified of pt lethargy and consistent drowsiness. Precedex infusion stopped. Pt vs stable. Pt arouses to stimulation and loud voice in ear. Pt is "hard of hearing and didn't sleep much in the last 2 nights" per son at bedside. She will come to bedside when available.   "

## 2017-03-11 NOTE — PLAN OF CARE
Problem: Occupational Therapy Goal  Goal: Occupational Therapy Goal  Goals to be met by: 3/25     Patient will increase functional independence with ADLs by performing:    UE Dressing while seated EOB with Set-up Assistance and Minimal Assistance.  LE Dressing while seated EOB with Set-up Assistance and Contact Guard Assistance.  Grooming while seated at sink with Set-up Assistance and Minimal Assistance.  Toileting from bedside commode with Minimal Assistance for hygiene and clothing management.   Sitting at edge of bed x10 minutes for dynamic functional task with Contact Guard Assistance.  Supine to sit with Contact Guard Assistance.  Toilet transfer to bedside commode with Contact Guard Assistance.  CG demo understanding for positioning and bed mobility.   Outcome: Ongoing (interventions implemented as appropriate)  OT eval completed. Goals set for return to baseline functional activity. POC set to 4x/w. Rec SNF at this time. CLARE Bustos 3/11/2017

## 2017-03-12 PROBLEM — R45.1 AGITATION REQUIRING SEDATION PROTOCOL: Status: ACTIVE | Noted: 2017-03-12

## 2017-03-12 LAB
ALBUMIN SERPL BCP-MCNC: 3.6 G/DL
ALP SERPL-CCNC: 62 U/L
ALT SERPL W/O P-5'-P-CCNC: 9 U/L
ANION GAP SERPL CALC-SCNC: 10 MMOL/L
AORTIC VALVE REGURGITATION: ABNORMAL
AST SERPL-CCNC: 15 U/L
BASOPHILS # BLD AUTO: 0.02 K/UL
BASOPHILS NFR BLD: 0.4 %
BILIRUB SERPL-MCNC: 1.7 MG/DL
BUN SERPL-MCNC: 13 MG/DL
CALCIUM SERPL-MCNC: 9 MG/DL
CHLORIDE SERPL-SCNC: 104 MMOL/L
CO2 SERPL-SCNC: 25 MMOL/L
CREAT SERPL-MCNC: 0.8 MG/DL
DIASTOLIC DYSFUNCTION: YES
DIFFERENTIAL METHOD: ABNORMAL
EOSINOPHIL # BLD AUTO: 0.1 K/UL
EOSINOPHIL NFR BLD: 1.2 %
ERYTHROCYTE [DISTWIDTH] IN BLOOD BY AUTOMATED COUNT: 13.2 %
EST. GFR  (AFRICAN AMERICAN): >60 ML/MIN/1.73 M^2
EST. GFR  (NON AFRICAN AMERICAN): >60 ML/MIN/1.73 M^2
ESTIMATED PA SYSTOLIC PRESSURE: 34
GLUCOSE SERPL-MCNC: 114 MG/DL
HCT VFR BLD AUTO: 37.8 %
HGB BLD-MCNC: 12.8 G/DL
INR PPP: 1.2
LYMPHOCYTES # BLD AUTO: 1 K/UL
LYMPHOCYTES NFR BLD: 20.9 %
MAGNESIUM SERPL-MCNC: 1.7 MG/DL
MCH RBC QN AUTO: 35.1 PG
MCHC RBC AUTO-ENTMCNC: 33.9 %
MCV RBC AUTO: 104 FL
MITRAL VALVE REGURGITATION: ABNORMAL
MONOCYTES # BLD AUTO: 0.4 K/UL
MONOCYTES NFR BLD: 8.9 %
NEUTROPHILS # BLD AUTO: 3.3 K/UL
NEUTROPHILS NFR BLD: 68.6 %
PHOSPHATE SERPL-MCNC: 3.2 MG/DL
PLATELET # BLD AUTO: 142 K/UL
PMV BLD AUTO: 9.9 FL
POCT GLUCOSE: 119 MG/DL (ref 70–110)
POCT GLUCOSE: 121 MG/DL (ref 70–110)
POCT GLUCOSE: 122 MG/DL (ref 70–110)
POCT GLUCOSE: 85 MG/DL (ref 70–110)
POTASSIUM SERPL-SCNC: 3.9 MMOL/L
PROT SERPL-MCNC: 6.2 G/DL
PROTHROMBIN TIME: 12.1 SEC
RBC # BLD AUTO: 3.65 M/UL
RETIRED EF AND QEF - SEE NOTES: 55 (ref 55–65)
SODIUM SERPL-SCNC: 139 MMOL/L
TRICUSPID VALVE REGURGITATION: ABNORMAL
WBC # BLD AUTO: 4.83 K/UL

## 2017-03-12 PROCEDURE — 20000000 HC ICU ROOM

## 2017-03-12 PROCEDURE — 99233 SBSQ HOSP IP/OBS HIGH 50: CPT | Mod: ,,, | Performed by: ANESTHESIOLOGY

## 2017-03-12 PROCEDURE — 63600175 PHARM REV CODE 636 W HCPCS: Performed by: STUDENT IN AN ORGANIZED HEALTH CARE EDUCATION/TRAINING PROGRAM

## 2017-03-12 PROCEDURE — 25000003 PHARM REV CODE 250: Performed by: STUDENT IN AN ORGANIZED HEALTH CARE EDUCATION/TRAINING PROGRAM

## 2017-03-12 PROCEDURE — 25000003 PHARM REV CODE 250: Performed by: PSYCHIATRY & NEUROLOGY

## 2017-03-12 PROCEDURE — 25000003 PHARM REV CODE 250: Performed by: PHYSICIAN ASSISTANT

## 2017-03-12 PROCEDURE — 80053 COMPREHEN METABOLIC PANEL: CPT

## 2017-03-12 PROCEDURE — 85025 COMPLETE CBC W/AUTO DIFF WBC: CPT

## 2017-03-12 PROCEDURE — 83735 ASSAY OF MAGNESIUM: CPT

## 2017-03-12 PROCEDURE — 25000003 PHARM REV CODE 250: Performed by: ANESTHESIOLOGY

## 2017-03-12 PROCEDURE — 85610 PROTHROMBIN TIME: CPT

## 2017-03-12 PROCEDURE — 84100 ASSAY OF PHOSPHORUS: CPT

## 2017-03-12 PROCEDURE — 25000003 PHARM REV CODE 250: Performed by: NEUROLOGICAL SURGERY

## 2017-03-12 RX ORDER — ESCITALOPRAM OXALATE 10 MG/1
10 TABLET ORAL DAILY
Status: DISCONTINUED | OUTPATIENT
Start: 2017-03-12 | End: 2017-03-12

## 2017-03-12 RX ORDER — DIPHENHYDRAMINE HYDROCHLORIDE 50 MG/ML
12.5 INJECTION INTRAMUSCULAR; INTRAVENOUS ONCE AS NEEDED
Status: COMPLETED | OUTPATIENT
Start: 2017-03-12 | End: 2017-03-12

## 2017-03-12 RX ORDER — DIPHENHYDRAMINE HYDROCHLORIDE 50 MG/ML
12.5 INJECTION INTRAMUSCULAR; INTRAVENOUS ONCE
Status: COMPLETED | OUTPATIENT
Start: 2017-03-12 | End: 2017-03-12

## 2017-03-12 RX ORDER — ESCITALOPRAM OXALATE 5 MG/1
5 TABLET ORAL DAILY
Status: DISCONTINUED | OUTPATIENT
Start: 2017-03-13 | End: 2017-03-14

## 2017-03-12 RX ORDER — SODIUM CHLORIDE 9 MG/ML
INJECTION, SOLUTION INTRAVENOUS CONTINUOUS
Status: DISCONTINUED | OUTPATIENT
Start: 2017-03-12 | End: 2017-03-13

## 2017-03-12 RX ORDER — LATANOPROST 50 UG/ML
1 SOLUTION/ DROPS OPHTHALMIC NIGHTLY
Status: DISCONTINUED | OUTPATIENT
Start: 2017-03-12 | End: 2017-03-22 | Stop reason: HOSPADM

## 2017-03-12 RX ORDER — TIMOLOL MALEATE 5 MG/ML
1 SOLUTION/ DROPS OPHTHALMIC 2 TIMES DAILY
Status: DISCONTINUED | OUTPATIENT
Start: 2017-03-12 | End: 2017-03-22 | Stop reason: HOSPADM

## 2017-03-12 RX ADMIN — LATANOPROST 1 DROP: 50 SOLUTION/ DROPS OPHTHALMIC at 08:03

## 2017-03-12 RX ADMIN — DIPHENHYDRAMINE HYDROCHLORIDE 12.5 MG: 50 INJECTION, SOLUTION INTRAMUSCULAR; INTRAVENOUS at 04:03

## 2017-03-12 RX ADMIN — SODIUM CHLORIDE 500 ML: 0.9 INJECTION, SOLUTION INTRAVENOUS at 05:03

## 2017-03-12 RX ADMIN — Medication 3 ML: at 03:03

## 2017-03-12 RX ADMIN — FAMOTIDINE 20 MG: 10 INJECTION, SOLUTION INTRAVENOUS at 08:03

## 2017-03-12 RX ADMIN — SODIUM CHLORIDE: 0.9 INJECTION, SOLUTION INTRAVENOUS at 11:03

## 2017-03-12 RX ADMIN — Medication 3 ML: at 09:03

## 2017-03-12 RX ADMIN — SODIUM CHLORIDE: 0.9 INJECTION, SOLUTION INTRAVENOUS at 05:03

## 2017-03-12 RX ADMIN — Medication 3 ML: at 05:03

## 2017-03-12 RX ADMIN — FAMOTIDINE 20 MG: 10 INJECTION, SOLUTION INTRAVENOUS at 09:03

## 2017-03-12 RX ADMIN — DEXMEDETOMIDINE HYDROCHLORIDE 0.2 MCG/KG/HR: 4 INJECTION, SOLUTION INTRAVENOUS at 12:03

## 2017-03-12 RX ADMIN — DEXMEDETOMIDINE HYDROCHLORIDE 0.4 MCG/KG/HR: 4 INJECTION, SOLUTION INTRAVENOUS at 07:03

## 2017-03-12 RX ADMIN — ESCITALOPRAM OXALATE 10 MG: 10 TABLET ORAL at 03:03

## 2017-03-12 RX ADMIN — TIMOLOL MALEATE 1 DROP: 5 SOLUTION OPHTHALMIC at 08:03

## 2017-03-12 NOTE — PROGRESS NOTES
Progress Note  Neurosurgery    Admit Date: 3/10/2017  Post-operative Day:    Hospital Day: 3    SUBJECTIVE:     Migue Fraser is a 89 y.o. male s/p fall on eliquis for afib with bilateral tSAH.  Follow-up For:  * No surgery found *    NAEON, unable to get CT c spine d/t agitation.    Scheduled Meds:   famotidine (PF)  20 mg Intravenous BID    sodium chloride 0.9%  3 mL Intravenous Q8H     Continuous Infusions:   sodium chloride 0.9% 75 mL/hr at 03/12/17 0600    dexmedetomidine (PRECEDEX) infusion Stopped (03/12/17 0400)     PRN Meds:labetalol, ondansetron    Review of patient's allergies indicates:   Allergen Reactions    Penicillins        OBJECTIVE:     Vital Signs (Most Recent)  Temp: 98.1 °F (36.7 °C) (03/11/17 2300)  Pulse: 69 (03/12/17 0600)  Resp: 18 (03/12/17 0600)  BP: (!) 109/58 (03/12/17 0600)  SpO2: 95 % (03/12/17 0600)    Vital Signs Range (Last 24H):  Temp:  [96.7 °F (35.9 °C)-98.1 °F (36.7 °C)]   Pulse:  [60-78]   Resp:  [15-25]   BP: ()/()   SpO2:  [91 %-97 %]     I & O (Last 24H):  Intake/Output Summary (Last 24 hours) at 03/12/17 0650  Last data filed at 03/12/17 0600   Gross per 24 hour   Intake          1290.43 ml   Output             1215 ml   Net            75.43 ml     Physical Exam:  E4V3M5, oriented to person  PERRL  Moves all spontaneously antigravity  SILT    Lines/Drains:       Peripheral IV - Single Lumen 03/11/17 Right Antecubital (Active)   Site Assessment Clean;Dry;Intact;No redness;No swelling 3/12/2017  3:00 AM   Line Status Blood return noted;Flushed 3/12/2017  3:00 AM   Dressing Status Clean;Dry;Intact 3/12/2017  3:00 AM   Dressing Intervention Dressing reinforced 3/12/2017  3:00 AM   Dressing Change Due 03/14/17 3/12/2017  3:00 AM   Site Change Due 03/14/17 3/12/2017  3:00 AM   Reason Not Rotated Not due 3/12/2017  3:00 AM   Number of days:1            Peripheral IV - Single Lumen 03/11/17 0800 Left Forearm (Active)   Site Assessment Clean;Dry;Intact;No  redness;No swelling 3/12/2017  3:00 AM   Line Status Blood return noted;Flushed;Saline locked 3/12/2017  3:00 AM   Dressing Status Clean;Dry;Intact 3/12/2017  3:00 AM   Dressing Intervention New dressing 3/12/2017  3:00 AM   Dressing Change Due 03/15/17 3/12/2017  3:00 AM   Site Change Due 03/15/17 3/12/2017  3:00 AM   Reason Not Rotated Not due 3/12/2017  3:00 AM   Number of days:0            External Urinary Catheter 03/11/17 1100 Small (Active)   Collection Container Urimeter 3/12/2017  3:00 AM   Securement Method secured to top of thigh w/ tape 3/12/2017  3:00 AM   Skin no redness;no breakdown 3/12/2017  3:00 AM   Tolerance no signs/symptoms of discomfort 3/12/2017  3:00 AM   Output (mL) 100 mL 3/12/2017  6:00 AM   Number of days:0       Wound/Incision:  na    Laboratory:  CBC:   Recent Labs  Lab 03/12/17  0345   WBC 4.83   RBC 3.65*   HGB 12.8*   HCT 37.8*   *   *   MCH 35.1*   MCHC 33.9     CMP:   Recent Labs  Lab 03/12/17  0345   *   CALCIUM 9.0   ALBUMIN 3.6   PROT 6.2      K 3.9   CO2 25      BUN 13   CREATININE 0.8   ALKPHOS 62   ALT 9*   AST 15   BILITOT 1.7*     Coagulation:   Recent Labs  Lab 03/12/17  0345   INR 1.2     Cardiac markers:   Recent Labs  Lab 03/11/17  0008   TROPONINI 0.013     No results for input(s): COLORU, CLARITYU, SPECGRAV, PHUR, PROTEINUA, GLUCOSEU, BILIRUBINCON, BLOODU, WBCU, RBCU, BACTERIA, MUCUS, NITRITE, LEUKOCYTESUR, UROBILINOGEN, HYALINECASTS in the last 168 hours.      Diagnostic Results:      ASSESSMENT/PLAN:     Assessment: 89 M s/p fall on eliquis for afib with bilateral tSAH.  -Pt neuro stable  -q 1 hr neuro checks  -CT c spine when able  -SBP less than 150  -Goal eunatremia  -Limit sedation  -Medical mgmt per NCC

## 2017-03-12 NOTE — PLAN OF CARE
Problem: Patient Care Overview  Goal: Plan of Care Review  Outcome: Ongoing (interventions implemented as appropriate)  No acute events during shift. VSS. CT of neck and spine done. Accuchecks q6h. IV fluids continued. Patient oob to bathroom with assistance. Family updated on poc. Will continue to monitor.

## 2017-03-12 NOTE — PLAN OF CARE
Problem: Patient Care Overview  Goal: Plan of Care Review  Outcome: Ongoing (interventions implemented as appropriate)  POC reviewed with pt and family at 1400. Pt unable to verbalize understanding but family agrees with plan of care proposed by NCC team. Questions and concerns addressed. No acute events today. Pt progressing toward goals. Will continue to monitor. See flowsheets for full assessment and VS info.

## 2017-03-12 NOTE — PROGRESS NOTES
CT could not be completed because patient became agitated and was moving. Patient was receiving .6 mcg of precedex at the time. Will continue to monitor.

## 2017-03-12 NOTE — PROGRESS NOTES
ICU Progress Note  Neurocritical Care    Admit Date: 3/10/2017  LOS: 1    CC: <principal problem not specified>    Code Status: Full Code     SUBJECTIVE:     Interval History/Significant Events: Patient too agitated for CT this morning. Otherwise doing well, alert and conversant. Baseline dementia only oriented to person only.     Review of Symptoms:   Constitutional: Denies fevers or chills.  Pulmonary: Denies shortness of breath or cough.  Cardiology: Denies chest pain or palpitations.  GI: Denies abdominal pain or constipation.  Neurologic: Denies new weakness, headaches, or paresthesias.     Medications:  Continuous Infusions:   sodium chloride 0.9% 75 mL/hr at 03/12/17 1400    dexmedetomidine (PRECEDEX) infusion Stopped (03/12/17 0400)     Scheduled Meds:   diphenhydrAMINE  12.5 mg Intravenous Once    escitalopram oxalate  10 mg Oral Daily    famotidine (PF)  20 mg Intravenous BID    sodium chloride 0.9%  3 mL Intravenous Q8H     PRN Meds:.diphenhydrAMINE, labetalol, ondansetron    OBJECTIVE:   Vital Signs (Most Recent):   Temp: 98.2 °F (36.8 °C) (03/12/17 0700)  Pulse: 75 (03/12/17 1420)  Resp: 18 (03/12/17 1420)  BP: 137/88 (03/12/17 1420)  SpO2: 96 % (03/12/17 1420)    Vital Signs (24h Range):   Temp:  [97.9 °F (36.6 °C)-98.2 °F (36.8 °C)] 98.2 °F (36.8 °C)  Pulse:  [60-75] 75  Resp:  [16-47] 18  SpO2:  [89 %-97 %] 96 %  BP: ()/(50-88) 137/88    ICP/CPP (Last 24h):        I & O (Last 24h):   Intake/Output Summary (Last 24 hours) at 03/12/17 1511  Last data filed at 03/12/17 0700   Gross per 24 hour   Intake            697.2 ml   Output             1290 ml   Net           -592.8 ml     Physical Exam:  GA: Alert, comfortable, no acute distress.   HEENT: No scleral icterus or JVD.   Pulmonary: Clear to auscultation A/P/L. No wheezing, crackles, or rhonchi.  Cardiac: RRR S1 & S2 w/o rubs/murmurs/gallops.   Abdominal: Bowel sounds present x 4. No appreciable hepatosplenomegaly.  Skin: No jaundice,  rashes, or visible lesions.  Neuro:  --GCS: E4 V5 M6  --Mental Status:  Alert, oriented to person  --CN II-XII grossly intact.   --Pupils 3mm, PERRL.   --Corneal reflex, gag, cough intact.  --LUE strength: 5/5  --RUE strength: 5/5  --LLE strength: 5/5  --RLE strength: 5/5    Vent Data:        Lines/Drains/Airway:              External Urinary Catheter 03/11/17 1100 Small (Active)   Collection Container Urimeter 3/12/2017 11:00 AM   Securement Method secured to top of thigh w/ tape 3/12/2017 11:00 AM   Skin no redness;no breakdown 3/12/2017 11:00 AM   Tolerance no signs/symptoms of discomfort 3/12/2017 11:00 AM   Output (mL) 75 mL 3/12/2017  7:00 AM     Nutrition/Tube Feeds (if NPO state why): regular diet     Labs:  ABG: No results for input(s): PH, PO2, PCO2, HCO3, POCSATURATED, BE in the last 24 hours.  BMP:  Recent Labs  Lab 03/12/17  0345      K 3.9      CO2 25   BUN 13   CREATININE 0.8   *   MG 1.7   PHOS 3.2     LFT: Lab Results   Component Value Date    AST 15 03/12/2017    ALT 9 (L) 03/12/2017    ALKPHOS 62 03/12/2017    BILITOT 1.7 (H) 03/12/2017    ALBUMIN 3.6 03/12/2017    PROT 6.2 03/12/2017     CBC:   Lab Results   Component Value Date    WBC 4.83 03/12/2017    HGB 12.8 (L) 03/12/2017    HCT 37.8 (L) 03/12/2017     (H) 03/12/2017     (L) 03/12/2017     Microbiology x 7d:   Microbiology Results (last 7 days)     ** No results found for the last 168 hours. **        Imaging:  Imaging Results         CT Limited (Final result) Result time:  03/12/17 03:14:00    Procedure changed from CT Cervical Spine Without Contrast        Final result by Annabel Ramirez MD (03/12/17 03:14:00)    Impression:     See above  ______________________________________     Electronically signed by resident: ANNABEL RAMIREZ MD  Date:     03/12/17  Time:    03:10            As the supervising and teaching physician, I personally reviewed the images and resident's interpretation and I agree with  the findings.          Electronically signed by: LETICIA NEVAREZ MD  Date:     03/12/17  Time:    03:14     Narrative:    CT limited    Technique: Single  image of the cervical spine for CT localization    Findings: CT exam terminated due to patient inability to remain still despite maximum sedation provided.  Nondiagnostic  image.            X-Ray Chest 1 View (Final result) Result time:  03/11/17 14:36:55    Final result by Lucille Small MD (03/11/17 14:36:55)    Impression:     No significant interval change.      Electronically signed by: LUCILLE SMALL MD  Date:     03/11/17  Time:    14:36     Narrative:    Portable AP chest x-ray    Comparison: 12/31/13    Findings: There is prominence of perihilar interstitial markings.  No consolidation, pleural effusion, or pneumothorax.   Cardiac silhouette is prominent.            CT Head Without Contrast (Final result) Result time:  03/11/17 03:32:35    Final result by Sena Pollard MD (03/11/17 03:32:35)    Impression:        Please note evaluation is degraded by motion and beam hardening artifacts particularly about the posterior fossa.    Grossly stable multifocal parenchymal contusions with superimposed component of subarachnoid hemorrhage.  Please note, there is improved evaluation of the left aspect of the posterior fossa, noting additional subarachnoid hemorrhage/contusion in the region.  No evidence of significant mass effect,, midline shift, or hydrocephalus.  Possible trace blood within the posterior horn of the left lateral ventricle versus adjacent subarachnoid hemorrhage, the latter favored.  Continued followup is recommended.    Soft tissue swelling/hematoma overlying the left frontal calvarium.      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    Additional findings as above.      ______________________________________     Electronically  signed by resident: CLYDE GALLAGHER MD  Date:     03/11/17  Time:    03:25            As the supervising and teaching physician, I personally reviewed the images and resident's interpretation and I agree with the findings.          Electronically signed by: LETICIA NEVAREZ MD  Date:     03/11/17  Time:    03:32     Narrative:    Technique: Axial images of the head were acquired without the administration of contrast.  Sagittal and coronal reformatted images were also obtained.    Clinical indication: Followup.    Comparison: CT head 3/10/17.    Findings:    Please note exam is degraded by motion and beam hardening artifact especially that of the posterior fossa.    Once again identified are areas of serpiginous abnormal high attenuation, most compatible with multifocal subarachnoid hemorrhage.  Additionally, several areas of parenchymal high attenuation are noted, compatible with parenchymal contusion.  Overall, the extent and distribution is grossly stable as compared to the previous exam noting a slightly more conspicuous focus along the lateral margin of the left temporal lobe, previously obscured by motion artifact on the previous examination.  No evidence of significant mass effect, midline shift or hydrocephalus.      There is no evidence of acute or recent major vascular territory cerebral infarction. Remote lacunar type infarct in the right caudate head and the right thalamus.      There is generalized cerebral volume loss and compensatory enlargement of the ventricular system and sulci.  There is a focus of either parenchymal contusion, or possibly interventricular hemorrhage within the posterior aspect of the left lateral ventricle versus within the occipital lobe itself, evaluation is limited given patient motion although not significantly changed as compared to previous examination.  There is patchy and confluent regions of hypoattenuation scattered throughout the supratentorial white matter likely  sequela of chronic microvascular ischemic changes.        There is soft tissue swelling/hematoma overlying the left frontal calvarium.  The osseous structures and remaining extracranial soft tissues are unremarkable.      There are mucous retention cysts or polyps within the bilateral maxillary sinuses with minimal layering aerated secretions within the right maxillary sinus.  There is patchy opacification of the ethmoid sinuses bilaterally. The remaining visualized paranasal sinuses and mastoid air cells are clear.            CT Head Without Contrast (Final result)    Abnormal Result time:  03/10/17 23:06:21    Final result by Clyde Pollard MD (03/10/17 23:06:21)    Impression:         Motion limited exam.    Several areas of parenchymal contusion with superimposed component of subarachnoid hemorrhage, as above.  No evidence of significant mass effect, midline shift or hydrocephalus.  Continued followup is recommended.    Soft tissue swelling/hematoma overlying the left frontal calvarium.      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    Additional findings as above.      This report has been flagged in the [facility] medical record.        ______________________________________     Electronically signed by resident: CLYDE POLLARD MD  Date:     03/10/17  Time:    22:58            As the supervising and teaching physician, I personally reviewed the images and resident's interpretation and I agree with the findings.          Electronically signed by: LETICIA NEVAREZ MD  Date:     03/10/17  Time:    23:06     Narrative:    Technique: Axial images of the head were acquired without the administration of contrast.  Sagittal and coronal reformatted images were also obtained.    Clinical indication: Fall.    Comparison: None.      Findings:      Limited exam secondary to patient motion.    There are multifocal  regions of abnormal high attenuation, most of which appear relatively serpiginous and suggest multifocal subarachnoid hemorrhage in the setting of trauma.  Of note, a few of which, particularly within the posterior right frontal lobe, anterior left frontal lobe, and posterior left parietal lobe likely represent a combination of subarachnoid hemorrhage and parenchymal contusion.  Motion artifact limits evaluation of the posterior fossa although along the lateral margins of cerebellum, additional foci of subarachnoid hemorrhage and/or contusion may be present, particularly on the left and also involving the posterior left temporal lobe.  No evidence of significant mass effect, midline shift or hydrocephalus.       There is no evidence of acute or recent major vascular territory cerebral infarction.   There is generalized cerebral volume loss and compensatory enlargement of the ventricular system and sulci.  Additionally, patchy and confluent hypoattenuation is scattered throughout the supratentorial white matter, nonspecific but may represent sequela of chronic microvascular ischemic changes.  Punctate hypoattenuation is noted within the anterior aspect of the right caudate suggesting remote lacunar infarct, as well as involving the body of the right thalamus.  There is soft tissue swelling/hematoma overlying the left frontal calvarium.  The osseous structures and remaining extracranial soft tissues are unremarkable.  There are mucous retention cysts or polyps within the bilateral maxillary sinuses with minimal layering aerated secretions within the right maxillary sinus.  There is patchy opacification of the ethmoid sinuses bilaterally.  The remaining visualized paranasal sinuses and mastoid air cells are clear.              I personally reviewed the above image.    ASSESSMENT/PLAN:     Active Hospital Problems    Diagnosis    Agitation requiring sedation protocol    Contusion of left side of brain without loss of  consciousness    Contusion of right side of brain without loss of consciousness    Abnormal coagulation profile    Traumatic subarachnoid hematoma with loss of consciousness    Atrial fibrillation      Neuro: GCS 15, alert, oriented only to self. Back to baseline dementia per daughter  - bilateral SAH   - hold eliquis  - repeat CT head and C spine, benadryl for sedation as precedex bottomed his pressures  - Q1 neuro checks  - SBP <150  - Na goal eunatremia  - restart lexapro 10mg (home dose 20mg)      Pulmonary: no acute issues, O2 sats 99 on RA     Cardiac: HD stable not requiring antihypertensives.        Renal:   - BUN/Cr 13/0.8  - good UOP  - continue to monitor        ID: no leukocytosis, afebrile        Hem/Onc: H/H stable, plts 142  - on elaquis. Given KCentra.         Endocrine:  BG stable         Fluids/Electrolytes/Nutrition/GI: NPO, MIVF at 100, replace lytes prn       Proph:  DVT: mechanical  Constipation:  Last output:3/12  PUP:  VAP:      Signing Physician:  Allie Angela MD   Internal Medicine PGY-1

## 2017-03-13 ENCOUNTER — TELEPHONE (OUTPATIENT)
Dept: NEUROSURGERY | Facility: CLINIC | Age: 82
End: 2017-03-13

## 2017-03-13 DIAGNOSIS — Z79.01 CURRENT USE OF LONG TERM ANTICOAGULATION: ICD-10-CM

## 2017-03-13 DIAGNOSIS — I60.9 SAH (SUBARACHNOID HEMORRHAGE): Primary | ICD-10-CM

## 2017-03-13 LAB
ALBUMIN SERPL BCP-MCNC: 3.3 G/DL
ALP SERPL-CCNC: 62 U/L
ALT SERPL W/O P-5'-P-CCNC: 8 U/L
ANION GAP SERPL CALC-SCNC: 8 MMOL/L
AST SERPL-CCNC: 16 U/L
BACTERIA #/AREA URNS AUTO: NORMAL /HPF
BASOPHILS # BLD AUTO: 0.03 K/UL
BASOPHILS NFR BLD: 0.7 %
BILIRUB SERPL-MCNC: 1.3 MG/DL
BILIRUB UR QL STRIP: NEGATIVE
BUN SERPL-MCNC: 12 MG/DL
CALCIUM SERPL-MCNC: 8.7 MG/DL
CHLORIDE SERPL-SCNC: 107 MMOL/L
CLARITY UR REFRACT.AUTO: CLEAR
CO2 SERPL-SCNC: 26 MMOL/L
COLOR UR AUTO: YELLOW
CREAT SERPL-MCNC: 0.9 MG/DL
DIFFERENTIAL METHOD: ABNORMAL
EOSINOPHIL # BLD AUTO: 0.2 K/UL
EOSINOPHIL NFR BLD: 3.3 %
ERYTHROCYTE [DISTWIDTH] IN BLOOD BY AUTOMATED COUNT: 13.2 %
EST. GFR  (AFRICAN AMERICAN): >60 ML/MIN/1.73 M^2
EST. GFR  (NON AFRICAN AMERICAN): >60 ML/MIN/1.73 M^2
GLUCOSE SERPL-MCNC: 102 MG/DL
GLUCOSE UR QL STRIP: NEGATIVE
HCT VFR BLD AUTO: 36.7 %
HGB BLD-MCNC: 12.5 G/DL
HGB UR QL STRIP: ABNORMAL
INR PPP: 1.1
KETONES UR QL STRIP: ABNORMAL
LEUKOCYTE ESTERASE UR QL STRIP: NEGATIVE
LYMPHOCYTES # BLD AUTO: 1.3 K/UL
LYMPHOCYTES NFR BLD: 29.2 %
MAGNESIUM SERPL-MCNC: 1.8 MG/DL
MCH RBC QN AUTO: 35.1 PG
MCHC RBC AUTO-ENTMCNC: 34.1 %
MCV RBC AUTO: 103 FL
MICROSCOPIC COMMENT: NORMAL
MONOCYTES # BLD AUTO: 0.7 K/UL
MONOCYTES NFR BLD: 14.3 %
NEUTROPHILS # BLD AUTO: 2.4 K/UL
NEUTROPHILS NFR BLD: 52.3 %
NITRITE UR QL STRIP: NEGATIVE
PH UR STRIP: 6 [PH] (ref 5–8)
PHOSPHATE SERPL-MCNC: 2.7 MG/DL
PLATELET # BLD AUTO: 140 K/UL
PMV BLD AUTO: 9.9 FL
POCT GLUCOSE: 114 MG/DL (ref 70–110)
POCT GLUCOSE: 115 MG/DL (ref 70–110)
POCT GLUCOSE: 124 MG/DL (ref 70–110)
POCT GLUCOSE: 128 MG/DL (ref 70–110)
POCT GLUCOSE: 99 MG/DL (ref 70–110)
POTASSIUM SERPL-SCNC: 3.6 MMOL/L
PROT SERPL-MCNC: 6.1 G/DL
PROT UR QL STRIP: NEGATIVE
PROTHROMBIN TIME: 11.9 SEC
RBC # BLD AUTO: 3.56 M/UL
RBC #/AREA URNS AUTO: 1 /HPF (ref 0–4)
SODIUM SERPL-SCNC: 141 MMOL/L
SP GR UR STRIP: 1.01 (ref 1–1.03)
SQUAMOUS #/AREA URNS AUTO: 0 /HPF
URN SPEC COLLECT METH UR: ABNORMAL
UROBILINOGEN UR STRIP-ACNC: NEGATIVE EU/DL
WBC # BLD AUTO: 4.56 K/UL
WBC #/AREA URNS AUTO: 1 /HPF (ref 0–5)

## 2017-03-13 PROCEDURE — 85025 COMPLETE CBC W/AUTO DIFF WBC: CPT

## 2017-03-13 PROCEDURE — 83735 ASSAY OF MAGNESIUM: CPT

## 2017-03-13 PROCEDURE — 25000003 PHARM REV CODE 250: Performed by: ANESTHESIOLOGY

## 2017-03-13 PROCEDURE — 92523 SPEECH SOUND LANG COMPREHEN: CPT

## 2017-03-13 PROCEDURE — 25000003 PHARM REV CODE 250: Performed by: PHYSICIAN ASSISTANT

## 2017-03-13 PROCEDURE — 81001 URINALYSIS AUTO W/SCOPE: CPT

## 2017-03-13 PROCEDURE — 99233 SBSQ HOSP IP/OBS HIGH 50: CPT | Mod: ,,, | Performed by: PSYCHIATRY & NEUROLOGY

## 2017-03-13 PROCEDURE — 85610 PROTHROMBIN TIME: CPT

## 2017-03-13 PROCEDURE — 93005 ELECTROCARDIOGRAM TRACING: CPT

## 2017-03-13 PROCEDURE — 80053 COMPREHEN METABOLIC PANEL: CPT

## 2017-03-13 PROCEDURE — 63600175 PHARM REV CODE 636 W HCPCS: Performed by: PSYCHIATRY & NEUROLOGY

## 2017-03-13 PROCEDURE — 97530 THERAPEUTIC ACTIVITIES: CPT

## 2017-03-13 PROCEDURE — 93010 ELECTROCARDIOGRAM REPORT: CPT | Mod: ,,, | Performed by: INTERNAL MEDICINE

## 2017-03-13 PROCEDURE — 84100 ASSAY OF PHOSPHORUS: CPT

## 2017-03-13 PROCEDURE — 20000000 HC ICU ROOM

## 2017-03-13 PROCEDURE — 94761 N-INVAS EAR/PLS OXIMETRY MLT: CPT

## 2017-03-13 PROCEDURE — 25000003 PHARM REV CODE 250: Performed by: PSYCHIATRY & NEUROLOGY

## 2017-03-13 PROCEDURE — 97535 SELF CARE MNGMENT TRAINING: CPT

## 2017-03-13 RX ORDER — ENOXAPARIN SODIUM 100 MG/ML
40 INJECTION SUBCUTANEOUS EVERY 24 HOURS
Status: DISCONTINUED | OUTPATIENT
Start: 2017-03-13 | End: 2017-03-17

## 2017-03-13 RX ORDER — METOPROLOL SUCCINATE 25 MG/1
25 TABLET, EXTENDED RELEASE ORAL DAILY
Status: DISCONTINUED | OUTPATIENT
Start: 2017-03-13 | End: 2017-03-22 | Stop reason: HOSPADM

## 2017-03-13 RX ORDER — HALOPERIDOL 5 MG/ML
10 INJECTION INTRAMUSCULAR ONCE
Status: COMPLETED | OUTPATIENT
Start: 2017-03-13 | End: 2017-03-13

## 2017-03-13 RX ORDER — HALOPERIDOL 5 MG/ML
5 INJECTION INTRAMUSCULAR EVERY 6 HOURS PRN
Status: DISCONTINUED | OUTPATIENT
Start: 2017-03-13 | End: 2017-03-17

## 2017-03-13 RX ORDER — DEXMEDETOMIDINE HYDROCHLORIDE 4 UG/ML
0.2 INJECTION, SOLUTION INTRAVENOUS CONTINUOUS
Status: DISCONTINUED | OUTPATIENT
Start: 2017-03-13 | End: 2017-03-14

## 2017-03-13 RX ORDER — OLANZAPINE 10 MG/2ML
2.5 INJECTION, POWDER, FOR SOLUTION INTRAMUSCULAR ONCE AS NEEDED
Status: COMPLETED | OUTPATIENT
Start: 2017-03-13 | End: 2017-03-13

## 2017-03-13 RX ORDER — RAMELTEON 8 MG/1
8 TABLET ORAL NIGHTLY PRN
Status: DISCONTINUED | OUTPATIENT
Start: 2017-03-13 | End: 2017-03-22 | Stop reason: HOSPADM

## 2017-03-13 RX ORDER — QUETIAPINE FUMARATE 25 MG/1
25 TABLET, FILM COATED ORAL 2 TIMES DAILY
Status: DISCONTINUED | OUTPATIENT
Start: 2017-03-13 | End: 2017-03-15

## 2017-03-13 RX ADMIN — ESCITALOPRAM 5 MG: 5 TABLET, FILM COATED ORAL at 09:03

## 2017-03-13 RX ADMIN — Medication 3 ML: at 10:03

## 2017-03-13 RX ADMIN — QUETIAPINE FUMARATE 25 MG: 25 TABLET, FILM COATED ORAL at 12:03

## 2017-03-13 RX ADMIN — ENOXAPARIN SODIUM 40 MG: 100 INJECTION SUBCUTANEOUS at 12:03

## 2017-03-13 RX ADMIN — Medication 3 ML: at 06:03

## 2017-03-13 RX ADMIN — HALOPERIDOL LACTATE 5 MG: 5 INJECTION, SOLUTION INTRAMUSCULAR at 06:03

## 2017-03-13 RX ADMIN — DEXMEDETOMIDINE HYDROCHLORIDE 0.2 MCG/KG/HR: 4 INJECTION, SOLUTION INTRAVENOUS at 11:03

## 2017-03-13 RX ADMIN — TIMOLOL MALEATE 1 DROP: 5 SOLUTION OPHTHALMIC at 10:03

## 2017-03-13 RX ADMIN — LATANOPROST 1 DROP: 50 SOLUTION/ DROPS OPHTHALMIC at 10:03

## 2017-03-13 RX ADMIN — HALOPERIDOL LACTATE 10 MG: 5 INJECTION, SOLUTION INTRAMUSCULAR at 09:03

## 2017-03-13 RX ADMIN — Medication 3 ML: at 02:03

## 2017-03-13 RX ADMIN — METOPROLOL SUCCINATE 25 MG: 25 TABLET, EXTENDED RELEASE ORAL at 12:03

## 2017-03-13 RX ADMIN — OLANZAPINE 2.5 MG: 10 INJECTION, POWDER, FOR SOLUTION INTRAMUSCULAR at 08:03

## 2017-03-13 RX ADMIN — TIMOLOL MALEATE 1 DROP: 5 SOLUTION OPHTHALMIC at 09:03

## 2017-03-13 NOTE — NURSING
Attempted to assist patient to bathroom with offgoing nurse KENDY John. Once in the bathroom patient refused to use toilet. Assisted patient back to bed and patient refused to get in bed. Patient very uncooperative & resistant. Patient pulling at IV lines. While refusing to get back in bed, he urinated & pooped on floor. 4 person assist to get in bed. Precedex started patient & patient kept attempting to get out of bed. Restraints initiated

## 2017-03-13 NOTE — PHYSICIAN QUERY
PT Name: Migue Fraser  MR #: [id number]  Physician Query Form - Neurological Condition  Clarification     Reviewer  Ext [id number]   Prema Bonds RN  This form is a permanent document in the medical record.     Query Date: March 13, 2017    By submitting this query, we are merely seeking further clarification of documentation. Please utilize your independent clinical judgment when addressing the question(s) below.     Indicators   Supporting Clinical Findings Location in Medical Record   X AMS, Confusion documented Pt has dementia at baseline but according to son is markedly more confused than usual. H & P 3/11    Radiology findings:      Medication(s):     X Treatment(s): Telephone order read back to order soft non-violent  restraints for patient and start  a precedex infusion Nurses note 3/11   X Behavioral Disturbance(s) documented At 5:20 patient became more anxious, was trying to get out of bed, was trying to kikk staff. Nurses note 3/11   X Other:      CT could not be completed because patient became agitated and was moving. Nurses note 3/12   Provider, please specify the diagnosis or diagnoses associated with above clinical findings.    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

## 2017-03-13 NOTE — PT/OT/SLP EVAL
"Speech Language Pathology Evaluation  Discharge Summary    Migue Fraser   MRN: [id number]   Admitting Diagnosis: bilateral SAH s/p mechanical fall    Diet recommendations: Solid Diet Level: Regular  Liquid Diet Level: Thin HOB to 90 degrees and Meds whole 1 at a time    SLP Treatment Date: 17  Speech Start Time: 1017     Speech Stop Time: 1039     Speech Total (min): 22 min       TREATMENT BILLABLE MINUTES:  Eval 12  and Treatment Swallowing Dysfunction 10    Diagnosis: bilateral SAH s/p mechanical fall      Past Medical History:   Diagnosis Date    A-fib     Atrial fibrillation     Blastomycosis     Cancer     prostate CA    Glaucoma     Sick sinus syndrome      Past Surgical History:   Procedure Laterality Date    APPENDECTOMY      CHOLECYSTECTOMY      HERNIA REPAIR      JOINT REPLACEMENT         Has the patient been evaluated by SLP for swallowing? : Yes  Keep patient NPO?: No   General Precautions: Standard, aspiration, fall, hearing impaired          Social Hx: Patient lives with his wife.    Occupational/hobbies/homemaking: Pt is a retired  and a retired .  He enjoys eating out and sitting on his sun porch at home.    Subjective:  "Well, she'll tell you all about it."  Pt stated gesturing to his wife when asked about his situation.  Patient goals: none stated.    Pain Ratin/10              Pain Rating Post-Intervention: 0/10    Objective:   Patient found with: peripheral IV, pulse ox (continuous), telemetry    Oral Musculature Evaluation  Oral Musculature: unable to assess due to poor participation/comprehension  Dentition: present and adequate  Mucosal Quality: good  Volitional Cough: not elicited  Volitional Swallow: not elicited  Voice Prior to PO Intake: wfl     Cognitive Status:  Behavioral Observations: alert, appropriate and confused-  Memory and Orientation: Pt was oriented to name only.  Immediate and short term memory were not assessed as pt has hx of " Dementia.  Pt's wife stated he does not recall his  or age or the year or location generally.  Attention: Decreased to tasks and details at baseline.  Problem Solving: Decreased for safety, daily solutions and reasoning at baseline.  Pragmatics: WFL.  Pt was jovial and polite.  Executive Function: insight/awareness and mental flexibility    Language: no    Auditory Comprehension: Pt was able to answer some basic questions and follow single directives given a model.    Verbal Expression: Pt. was able to express his basic wants and needs and answer basic questions.  He is unable to complete object naming tasks at baseline.  Language of confusion present at baseline.    Motor Speech: WNL    Voice: WNL    Augmentative Alternative Communication: no    Reading: DNT    Writing: DNT    Visual-Spatial: DNT    Additional Treatment:  Pt was seen w/ administration of oral meds whole w/ thin liquid wash.  He demonstrated normal oropharyngeal swallow function.  Pt's wife stated he tolerated limited amounts of breakfast w/ no overt s/s of difficulty.  Education was provided to pt and family present at bedside re: aspiration precautions and s/s of aspiration.  They indicated good understanding and agreed w/ recommendations.    FIM:  Social Interaction: 4 Minimal direction--The patient interacts appropriately 75 to 90% of the time.   Problem Solvin Total Assistance--The patient solves routine problems less than 25% of the time, or does not effectively solve problems, and may require constant one-to-one direction to complete simple daily activities.  The patient may need a restraint for safety.   Comprehension: 3 Moderate Prompting--The patient understands directions and conversation about basid daily needs 50 to 74% of the time.   Expression: 3 Moderate Prompting--The patient expresses basic daily needs and ideas 50 to 74% of the time.   Memory: 1 Total Assistance--The patient recognizes and remembers less than 25% of the  time, or does not effectively recognize and remember.     Assessment:  Migue Fraser is a 89 y.o. male with a SLP diagnosis of Cognitive-Linguistic Impairment. He present with baseline skills at this time.  Further Skilled Speech services are not indicated at this time.    Do you have any cultural, spiritual, Gnosticism conflicts, given your current situation?: none    Discharge recommendations: Discharge Facility/Level Of Care Needs: nursing facility, skilled     Goals:   SLP Goals     Not on file      Multidisciplinary Problems (Resolved)        Problem: SLP Goal    Goal Priority Disciplines Outcome   SLP Goal   (Resolved)     SLP Outcome(s) achieved   Description:  Speech Language Pathology Goals  Goals expected to be met by 3/18  1.  Pt. Will participate in speech language eval -Goal Met  2.  Pt. Will participate in ongoing assessment of swallow at bedside -Goal Met                    Plan:   Patient to be seen Therapy Frequency: 5 x/week   Plan of Care expires: 04/11/17  Plan of Care reviewed with: patient, daughter, spouse  SLP Follow-up?: No  SLP - Next Visit Date: 03/13/17           Vidya Jaramillo CCC-SLP  03/13/2017

## 2017-03-13 NOTE — PT/OT/SLP PROGRESS
Occupational Therapy  Treatment    Migue Fraser   MRN: [id number]   Admitting Diagnosis: Traumatic subarachnoid hematoma with loss of consciousness    OT Date of Treatment: 17   OT Start Time: 0935  OT Stop Time: 1005  OT Total Time (min): 30 min    Billable Minutes:  Self Care/Home Management 22 and Therapeutic Activity 8    General Precautions: Standard, fall, aspiration, seizure  Orthopedic Precautions: N/A  Braces: N/A         Subjective:  Communicated with RN prior to session.  Pt agreeable to participate with therapy. Wife and daughter present.    Pain Ratin/10              Pain Rating Post-Intervention: 0/10    Objective:  Patient found with: peripheral IV, blood pressure cuff, pulse ox (continuous), SCD, telemetry     Functional Mobility:  Bed Mobility:  Supine to Sit: Moderate Assistance (for trunk and BLE management)  Sit to Supine:  Pt left on BSC with nurse present    Transfers:   Sit <> Stand Assistance: Minimum Assistance (with 2 person assist for safety x2 trials from EOB)  Sit <> Stand Assistive Device: No Assistive Device  Toilet Transfer Assistance: Minimum Assistance (x2 person assist for safety)  Toilet Transfer Assistive Device: No Assistive Device, bedside commode    Functional Ambulation: ~6-8 steps within room Min A x2 person assist; min postural sway noted; assist for balance, safety, and for navigating environment. Pt with difficulty following commands to navigate to [facility].    Activities of Daily Living:  LE Dressing Level of Assistance: Maximum assistance to jerrell B socks while seated EOB. Max cues for task initiation and sequencing 2* cognitive status and poor sustained attention. Pt able to don R sock after (A) given to jerrell past toes.  Grooming Position: Standing  Grooming Level of Assistance: Maximum assistance to brush teeth and wash face. Assist for dynamic standing balance, task initiation/sequencing, problem solving, and for thoroughness.     Balance:   Static Sit:  FAIR+: Able to take MINIMAL challenges from all directions  Dynamic Sit: FAIR+: Maintains balance through MINIMAL excursions of active trunk motion  Static Stand: POOR+: Needs MINIMAL assist to maintain  Dynamic stand: POOR: N/A    Therapeutic Activities and Exercises:  Pt performed ~5-6 min dynamic standing while completing grooming with Mod A for balance. Pt required two seated rest breaks 2* fluctuating HR. HR remained stable after pt was finished brushing his teeth. Nurse was notified.    AM-PAC 6 CLICK ADL   How much help from another person does this patient currently need?   1 = Unable, Total/Dependent Assistance  2 = A lot, Maximum/Moderate Assistance  3 = A little, Minimum/Contact Guard/Supervision  4 = None, Modified [place]/Independent    Putting on and taking off regular lower body clothing? : 2  Bathing (including washing, rinsing, drying)?: 2  Toileting, which includes using toilet, bedpan, or urinal? : 2  Putting on and taking off regular upper body clothing?: 2  Taking care of personal grooming such as brushing teeth?: 2  Eating meals?: 2  Total Score: 12     AM-PAC Raw Score CMS G-Code Modifier Level of Impairment Assistance   6 % Total / Unable   7 - 9 CM 80 - 100% Maximal Assist   10 - 14 CL 60 - 80% Moderate Assist   15 - 19 CK 40 - 60% Moderate Assist   20 - 22 CJ 20 - 40% Minimal Assist   23 CI 1-20% SBA / CGA   24 CH 0% Independent/ Mod I     Patient left sitting on BSC with all lines intact, call button in reach and nurse and spouse present    ASSESSMENT:  Migue Fraser is a 89 y.o. male with a medical diagnosis of Traumatic subarachnoid hematoma with loss of consciousness. He presents with good participation and motivation during session. Pt progressed this date by performing bed mobility with Mod A, LB dressing (donning socks) with Max A, grooming in standing with Max A, and toilet t/f with Min A x2 person assist. Although pt is making steady progress, he continues to  demonstrate below impairments. Pt requires max cues to stay on task 2* easily distracted/poor sustained attention. Pt continues to benefit from skilled OT to improve deficits listed below to increase (I) and safety with ADL performance.    Rehab identified problem list/impairments: Rehab identified problem list/impairments: weakness, impaired endurance, impaired self care skills, impaired functional mobilty, impaired balance, decreased lower extremity function, impaired cognition, decreased coordination, decreased safety awareness, impaired skin    Rehab potential is good.    Activity tolerance: Good    Discharge recommendations: Discharge Facility/Level Of Care Needs: nursing facility, skilled     Barriers to discharge: Barriers to Discharge: Inaccessible home environment, Decreased caregiver support    Equipment recommendations: bedside commode, bath bench, wheelchair, hospital bed     GOALS:   Occupational Therapy Goals        Problem: Occupational Therapy Goal    Goal Priority Disciplines Outcome Interventions   Occupational Therapy Goal     OT, PT/OT Ongoing (interventions implemented as appropriate)    Description:  Goals to be met by: 3/25     Patient will increase functional independence with ADLs by performing:    UE Dressing while seated EOB with Set-up Assistance and Minimal Assistance.  LE Dressing while seated EOB with Set-up Assistance and Contact Guard Assistance.  Grooming while seated at sink with Set-up Assistance and Minimal Assistance.  Toileting from bedside commode with Minimal Assistance for hygiene and clothing management.   Sitting at edge of bed x10 minutes for dynamic functional task with Contact Guard Assistance.  Supine to sit with Contact Guard Assistance.  Toilet transfer to bedside commode with Contact Guard Assistance.  CG demo understanding for positioning and bed mobility.                 Plan:  Patient to be seen 4 x/week to address the above listed problems via self-care/home  management, therapeutic activities, therapeutic exercises, neuromuscular re-education, cognitive retraining  Plan of Care expires: 04/10/17  Plan of Care reviewed with: patient, daughter, spouse         Jazzmine Hammer, RASHARDR/LAUREN  03/13/2017

## 2017-03-13 NOTE — RESIDENT HANDOFF
Handoff     Primary Team: Networked reference to record PCT  Room Number: 7069/7069 A     Patient Name: Migue Fraser MRN: [id number]     Date of Birth: [id number] Allergies: Penicillins     Age: 89 y.o. Admit Date: 3/10/2017     Sex: male  BMI: Body mass index is 23.9 kg/(m^2).     Code Status: Full Code        Reason for Admission: Traumatic subarachnoid hematoma with loss of consciousness    Brief HPI (pertinent PMH and diagnosis or differential diagnosis):     89 yo M with PMHx of SSS and A-fib (on Eliquis), prostate cancer, and dementia who was experienced an unwitnessed fall at home the day of admission. Pt was found down in his back yard by his son. CT scan demonstrated multiple parenchymal hemorrhages with superimposed SAH. Pt has dementia at baseline but according to son is markedly more confused than usual.    Hospital Course (updated, brief assessment by system or problem, significant events):   On admit, patient was administered PCC; and neurosurgery was consulted. Repeat CT scans showed stable small intracranial sub arachnoid hemorrhages. Hospital course remarkable for agitation; requiring intermittent precedex ggt which was discontinued and transitioned to seroquel BID with PRN haldol. Patient otherwise neurologically stable and at patient's cognitive baseline. Home med metoprolol restarted for a fib.    Tasks (specific, using if-then statements):   - sitter/e-sitter given patient fall risk and sun downs.  - seroquel scheduled with PRN haldol for agitation.  - discussed with NSGY; plan to restart Eliquis 2 weeks from today as an outpatient   - follow up CT scan ordered by NSGY - discuss with NSGY about timing of this  - PT/OT recommending SNF

## 2017-03-13 NOTE — PROGRESS NOTES
ICU Attending Note  Neurocritical Care    [id number]    -stop dexmedetomidine  -quetiapine 25 mg q12h and prn haloperidol  -escitalopram  -clarify timing of anticoagulation v antiplatelet with Neurosurgery  --160  -restart metoprolol  -stop NS  -start enoxaparin prophylaxis  -up with assist    Goal: Transfer to floor on Medicine.

## 2017-03-13 NOTE — PHYSICIAN QUERY
PT Name: Migue Fraser  MR #: [id number]    Physician Query Form - Atrial Fibrillation and Flutter Specificity     Reviewer  Ext [phone]  Prema Bonds RN  This form is a permanent document in the medical record.     Query Date: March 13, 2017    Dear Provider,  By submitting this query, we are merely seeking further clarification of documentation to reflect the severity of illness of your patient. Please utilize your independent clinical judgment when addressing the question(s) below.     Indicators     Supporting Clinical Findings Location in Medical Record   X Atrial Fibrillation Documented pmhx of afib on eliquis Neurosurgery consult 3/11    Atrial Flutter Documented      EKG results     X Medication/Treatment - hold [facility]  Neurosurgery consult 3/11    Other       Please clarify the type of atrial fibrillation:    [ X ] Chronic  [  ] Paroxysmal  [  ] Permanent  [  ] Persistent  [  ] Other (please specify type) ____________________________  [  ] Unspecified    Please clarify the type of atrial flutter:    [  ] Atypical  [  ] Type I  [  ] Type II  [  ] Typical  [  ] Other (please specify type) ____________________________  [  ] Unspecified

## 2017-03-13 NOTE — PLAN OF CARE
03/13/17 1247   Right Care Assessment   Can the patient answer the patient profile reliably? No, cognitively impaired   How often would a person be available to care for the patient? Whenever needed   Describe the patient's ability to walk at the present time. No restrictions   How does the patient rate their overall health at the present time? (connie)   Number of comorbid conditions (as recorded on the chart) Two   During the past month, has the patient often been bothered by feeling down, depressed or hopeless? (connie-  baseline dementia)   During the past month, has the patient often been bothered by little interest or pleasure in doing things? (connie)         Ani Roque RN, CCRN-K, [facility]  Neuro-Critical Care   X [phone]

## 2017-03-13 NOTE — PT/OT/SLP PROGRESS
Physical Therapy  Treatment    Migue Fraser   MRN: [id number]   Admitting Diagnosis: Traumatic subarachnoid hematoma with loss of consciousness    PT Received On: 17  PT Start Time: 1316     PT Stop Time: 1340    PT Total Time (min): 24 min       Billable Minutes:  Therapeutic Activity 24    Treatment Type: Treatment  PT/PTA: PT             General Precautions: Standard, aspiration, fall, seizure  Orthopedic Precautions: N/A   Braces:      Do you have any cultural, spiritual, Anabaptist conflicts, given your current situation?: none noted    Subjective:  Communicated with RN prior to session.  Pt agreeable to PT session. Pt only oriented to person. PT provided reorientation.     Pain Ratin/10  Pain Rating Post-Intervention: 0/10    Objective:   Patient found with: peripheral IV, pulse ox (continuous), blood pressure cuff, SCD, telemetry (condom catheter)    Functional Mobility:  Bed Mobility:    Not assessed 2° pt sitting up in chair.     Transfers:  Sit <> Stand Assistance: Minimum Assistance (from chair x2)  Sit <> Stand Assistive Device: Rolling Walker    Bed <> Chair Technique: Stand Pivot  Bed <> Chair Assistance: Minimum Assistance (RW mgmt, balance, safety)  Bed <> Chair Assistive Device: Rolling Walker    Toilet Transfer Technique: Stand Pivot  Toilet Transfer Assistance: Minimum Assistance (RW mgmt, balance, safety)  Toilet Transfer Assistive Device: Rolling Walker, bedside commode    Gait:   Gait Distance: Pt ambulated ~5 feet using RW and CGA from PT. Demonstrated step to gait with decrease step length, kyphotic posture, and increase time in stance. Required assist for safety, mgmt RW, and balance.       Balance:   Static Sit: GOOD: Takes MODERATE challenges from all directions  Dynamic Sit: GOOD: Maintains balance through MODERATE excursions of active trunk movement  Static Stand: POOR+: Needs MINIMAL assist to maintain  Dynamic stand: FAIR: Needs CONTACT GUARD during gait     Therapeutic  Activities and Exercises:  Performed initial gait trial and pt asking about ambulating/using bathroom. Pt returned to sit in chair, while PT moved BSC from inside bathroom. Pt ambulated ~2ft using RW and PT assist. Pt performed static standing with RW & spouse CGA while PT assisted pt with perineal cleaning. Pt returned to BSC.     Education:  Education provided to pt and spouse regarding: PT role/POC; safety with mobility. Verbalized understanding.     Whiteboard updated with correct mobility information. RN/PCT notified.  Pt ok to transfer via stand pivot with RN/PCT. Use 1 person assist.       AM-PAC 6 CLICK MOBILITY  How much help from another person does this patient currently need?   1 = Unable, Total/Dependent Assistance  2 = A lot, Maximum/Moderate Assistance  3 = A little, Minimum/Contact Guard/Supervision  4 = None, Modified [place]/Independent    Turning over in bed (including adjusting bedclothes, sheets and blankets)?: 3  Sitting down on and standing up from a chair with arms (e.g., wheelchair, bedside commode, etc.): 3  Moving from lying on back to sitting on the side of the bed?: 3  Moving to and from a bed to a chair (including a wheelchair)?: 3  Need to walk in hospital room?: 3  Climbing 3-5 steps with a railing?: 2  Total Score: 17    AM-PAC Raw Score CMS G-Code Modifier Level of Impairment Assistance   6 % Total / Unable   7 - 9 CM 80 - 100% Maximal Assist   10 - 14 CL 60 - 80% Moderate Assist   15 - 19 CK 40 - 60% Moderate Assist   20 - 22 CJ 20 - 40% Minimal Assist   23 CI 1-20% SBA / CGA   24 CH 0% Independent/ Mod I     Patient left up in chair with all lines intact and call button in reach.    Assessment:  Migue Fraser is a 89 y.o. male with a medical diagnosis of Traumatic subarachnoid hematoma with loss of consciousness and presents with decrease endurance, strength, balance, coordination, and cognition impairing safety with overall mobility. Pt tolerated session well  and continues to progress towards goals.  Pt demonstrated better mobility this visit requiring less assistance this visit. Progress towards goals limited/impacted by cognition/safety awareness.  Pt would benefit from continued skilled physical therapy to address listed impairments, improve functional independence, and maximize safety with mobility.  PT recommends dc disposition to SNF for further strengthening and mobility. Pt demonstrates good potential to progress and would benefit from daily therapy to maximize recovery to gain functional independence prior to d/c home.  .  .    Rehab identified problem list/impairments: Rehab identified problem list/impairments: weakness, gait instability, decreased lower extremity function, impaired endurance, impaired balance, impaired self care skills, impaired cognition, impaired functional mobilty, decreased coordination, decreased safety awareness    Rehab potential is good.    Activity tolerance: Good    Discharge recommendations: Discharge Facility/Level Of Care Needs: nursing facility, skilled     Barriers to discharge: Barriers to Discharge: Inaccessible home environment, Decreased caregiver support    Equipment recommendations: Equipment Needed After Discharge:  (TBD pending progress)     GOALS:   Physical Therapy Goals        Problem: Physical Therapy Goal    Goal Priority Disciplines Outcome Goal Variances Interventions   Physical Therapy Goal     PT/OT, PT Ongoing (interventions implemented as appropriate)     Description:  PT goals for the next 10 visits    1. Pt supine to sit with minimal assist-not met  2. Pt sit to supine with CGA-not met  3. Pt sit to stand with RW with CGA-not met  4. Pt to perform gait 100ft with RW with minimal assist.-not met  5. Pt to transfer bed to/from bedside chair with RW with minimal assist.-not met  6. Pt to up/down 6 steps with B UE rails with minimal assist.-not met  7. Pt to perform B LE exs in sitting or supine x 20 reps with  family assist.-not met                PLAN:    Patient to be seen 5 x/week  to address the above listed problems via gait training, therapeutic activities, neuromuscular re-education, therapeutic exercises  Plan of Care expires: 04/11/17  Plan of Care reviewed with: patient, spouse, daughter         Katherin Patterson, PT  03/13/2017

## 2017-03-13 NOTE — PLAN OF CARE
03/13/17 1235   Discharge Assessment   Assessment Type Discharge Planning Assessment   Confirmed/corrected address and phone number on facesheet? Yes   Assessment information obtained from? Caregiver   Communicated expected length of stay with patient/caregiver yes   Prior to hospitilization cognitive status: Unable to Assess  (Baseline Dementia)   Prior to hospitalization functional status: Needs Assistance;Assistive Equipment  (Patient requires assistance with ADLS.  Can dress self, but does not dress appropriately)   Current cognitive status: Unable to Assess   Current Functional Status: Needs Assistance   Arrived From admitted as an inpatient   Lives With spouse   Able to Return to Prior Arrangements yes   Is patient able to care for self after discharge? No   How many people do you have in your home that can help with your care after discharge? 1   Who are your caregiver(s) and their phone number(s)? Rebekah Fraser (wife)  [phone]   Patient's perception of discharge disposition home or selfcare   Readmission Within The Last 30 Days no previous admission in last 30 days   Patient currently being followed by outpatient case management? No   Patient currently receives home health services? No   Does the patient currently use HME? No   Patient currently receives private duty nursing? N/A   Patient currently receives any other outside agency services? Yes   Name and contact number of agency or person providing outside services Patient has a private sitter during the day   Is it the patient/care giver preference to resume care with the current outside agency? Yes   Equipment Currently Used at Home cane, straight;walker, rolling   Do you have any problems affording any of your prescribed medications? No   Is the patient taking medications as prescribed? yes   Do you have any financial concerns preventing you from receiving the healthcare you need? No   Does the patient have transportation to healthcare  appointments? Yes   Transportation Available family or friend will provide   On Dialysis? No   Does the patient receive services at the Coumadin Clinic? No   Are there any open cases? No   Discharge Plan A Home with family;Home Health  (Per wife, she does not want SNF at this time and prefers home with his sitter and home health)   Discharge Plan B Skilled Nursing Facility   Patient/Family In Agreement With Plan yes         PCP:  Iggy Telles MD      Pharmacy:    [facility] - [place] - [address]  [address]  [place] [address]  Phone: [phone] Fax: [phone]          Emergency Contacts:  Extended Emergency Contact Information  Primary Emergency Contact: Rebekah Fraser  Address: [address] [place] [address]           [place] [address] [facility]  Home Phone: [phone]  Mobile Phone: [phone]  Relation: Spouse  Secondary Emergency Contact: Kristin Barrera   [facility]  Home Phone: [phone]  Relation: Daughter      Insurance:    Payor: [employer] / Plan: [facility] CHOICE PLUS / Product Type: Commercial /       Ani Roque RN, CCRN-K, CCM  Neuro-Critical Care   X [phone]

## 2017-03-13 NOTE — PLAN OF CARE
Problem: Patient Care Overview  Goal: Interdisciplinary Rounds/Family Conf  Outcome: Ongoing (interventions implemented as appropriate)  Patient oriented to self only. Plan of care and all safety precautions maintained and discussed with patient and daughter. x2 soft wrist restraints in use. 0.02 mcg/kr/hr of Precedex. Patient calmer but easily aroused. VS stable-see flowsheet. Will continue to monitor.

## 2017-03-13 NOTE — PROGRESS NOTES
Progress Note  Neurosurgery    Admit Date: 3/10/2017  Post-operative Day:    Hospital Day: 4    SUBJECTIVE:     Migue Fraser is a 89 y.o. male s/p fall on eliquis for afib with bilateral tSAH.  Follow-up For:  * No surgery found *    NAEON, unable to get CT c spine d/t agitation.    Scheduled Meds:   escitalopram oxalate  5 mg Oral Daily    latanoprost  1 drop Both Eyes QHS    sodium chloride 0.9%  3 mL Intravenous Q8H    timolol maleate 0.5%  1 drop Both Eyes BID     Continuous Infusions:   sodium chloride 0.9% 75 mL/hr at 03/13/17 0905    dexmedetomidine (PRECEDEX) infusion Stopped (03/13/17 0705)     PRN Meds:labetalol, ondansetron    Review of patient's allergies indicates:   Allergen Reactions    Penicillins        OBJECTIVE:     Vital Signs (Most Recent)  Temp: 96.8 °F (36 °C) (03/13/17 0705)  Pulse: 69 (03/13/17 0905)  Resp: 18 (03/13/17 0905)  BP: (!) 148/76 (03/13/17 0905)  SpO2: 96 % (03/13/17 0905)    Vital Signs Range (Last 24H):  Temp:  [96.8 °F (36 °C)-97.9 °F (36.6 °C)]   Pulse:  [63-98]   Resp:  [16-43]   BP: ()/(53-88)   SpO2:  [80 %-98 %]     I & O (Last 24H):    Intake/Output Summary (Last 24 hours) at 03/13/17 0924  Last data filed at 03/13/17 0905   Gross per 24 hour   Intake          1025.86 ml   Output              400 ml   Net           625.86 ml     Physical Exam:  E4V2M5, resisting eye opening  PERRL  Moves all spontaneously and purposefully antigravity  Responds to noxious stimuli throughout     Lines/Drains:       Peripheral IV - Single Lumen 03/11/17 Right Antecubital (Active)   Site Assessment Clean;Dry;Intact;No redness;No swelling 3/12/2017  3:00 AM   Line Status Blood return noted;Flushed 3/12/2017  3:00 AM   Dressing Status Clean;Dry;Intact 3/12/2017  3:00 AM   Dressing Intervention Dressing reinforced 3/12/2017  3:00 AM   Dressing Change Due 03/14/17 3/12/2017  3:00 AM   Site Change Due 03/14/17 3/12/2017  3:00 AM   Reason Not Rotated Not due 3/12/2017  3:00  AM   Number of days:1            Peripheral IV - Single Lumen 03/11/17 0800 Left Forearm (Active)   Site Assessment Clean;Dry;Intact;No redness;No swelling 3/12/2017  3:00 AM   Line Status Blood return noted;Flushed;Saline locked 3/12/2017  3:00 AM   Dressing Status Clean;Dry;Intact 3/12/2017  3:00 AM   Dressing Intervention New dressing 3/12/2017  3:00 AM   Dressing Change Due 03/15/17 3/12/2017  3:00 AM   Site Change Due 03/15/17 3/12/2017  3:00 AM   Reason Not Rotated Not due 3/12/2017  3:00 AM   Number of days:0            External Urinary Catheter 03/11/17 1100 Small (Active)   Collection Container Urimeter 3/12/2017  3:00 AM   Securement Method secured to top of thigh w/ tape 3/12/2017  3:00 AM   Skin no redness;no breakdown 3/12/2017  3:00 AM   Tolerance no signs/symptoms of discomfort 3/12/2017  3:00 AM   Output (mL) 100 mL 3/12/2017  6:00 AM   Number of days:0       Wound/Incision:  na    Laboratory:  CBC:     Recent Labs  Lab 03/13/17  0401   WBC 4.56   RBC 3.56*   HGB 12.5*   HCT 36.7*   *   *   MCH 35.1*   MCHC 34.1     CMP:     Recent Labs  Lab 03/13/17  0401      CALCIUM 8.7   ALBUMIN 3.3*   PROT 6.1      K 3.6   CO2 26      BUN 12   CREATININE 0.9   ALKPHOS 62   ALT 8*   AST 16   BILITOT 1.3*     Coagulation:     Recent Labs  Lab 03/13/17  0401   INR 1.1     Cardiac markers:     Recent Labs  Lab 03/11/17  0008   TROPONINI 0.013     No results for input(s): COLORU, CLARITYU, SPECGRAV, PHUR, PROTEINUA, GLUCOSEU, BILIRUBINCON, BLOODU, WBCU, RBCU, BACTERIA, MUCUS, NITRITE, LEUKOCYTESUR, UROBILINOGEN, HYALINECASTS in the last 168 hours.      Diagnostic Results:      ASSESSMENT/PLAN:     Assessment: 89 M s/p fall on eliquis for afib with bilateral tSAH.    -repeat CTH ordered for Wednesday to monitor subdural hygroma on L;  CT c spine neg  -Agitation: intermittently requiring precedex, on lexapro  -SBP less than 150  -FEN/GI: ST following; Goal eunatremia;  -SCDs  -needs  aggressive PT/OT; OOB for >6hrs per day  -DISPO: SNF

## 2017-03-13 NOTE — TELEPHONE ENCOUNTER
----- Message from YAW Daigle sent at 3/13/2017  2:12 PM CDT -----  Please have him see me or another PA in 2 weeks with head CT. This is hospital follow up for a SDH/SAH after mechanical fall. No surgery.     Thanks. Kayla

## 2017-03-13 NOTE — PLAN OF CARE
SW completed LOCET via phone and faxed PASSR to the state.    1:57 PM: SW met with Pt, Pt wife, and Pt daughter at bedside. SW discussed therapy recs for SNF at length with Pt wife. She reported they have a sitter for Pt and want HH services. Gave lists for both HH and SNF. She will review and let this SW know when ready.    Pebbles Perkins, ALONDRA  Neurocritical Care   [facility]  [id number]

## 2017-03-13 NOTE — PLAN OF CARE
Problem: Patient Care Overview  Goal: Plan of Care Review  Outcome: Ongoing (interventions implemented as appropriate)  POC reviewed with pt and family at 1400. Pt unable to verbalize an understanding. Pt's wife verbalizes an understanding, pt remains agitated throughout shift, oriented to self, seroquel and haldol admin per NCC, pt consumed 25% of meals, pt remained up in chair for 4 hrs, Skin intact, Questions and concerns addressed. No acute events today. Pt progressing toward goals. Will continue to monitor. See flowsheets for full assessment and VS info.

## 2017-03-13 NOTE — PLAN OF CARE
Problem: SLP Goal  Goal: SLP Goal  Speech Language Pathology Goals  Goals expected to be met by 3/18  1. Pt. Will participate in speech language eval -Goal Met  2. Pt. Will participate in ongoing assessment of swallow at bedside -Goal Met     Outcome: Outcome(s) achieved Date Met:  03/13/17  Pt is tolerating regular consistency diet w/ thin liquids.  Speech-Language Cognitive evaluation completed.  Pt is functioning at baseline per spouse and daughter report.  Further Skilled Speech services are not indicated at this time.  ST to s/o.  Thank you.     Vidya Jaramillo CCC-SLP  3/13/2017

## 2017-03-13 NOTE — PLAN OF CARE
Problem: Physical Therapy Goal  Goal: Physical Therapy Goal  PT goals for the next 10 visits    1. Pt supine to sit with minimal assist-not met  2. Pt sit to supine with CGA-not met  3. Pt sit to stand with RW with CGA-not met  4. Pt to perform gait 100ft with RW with minimal assist.-not met  5. Pt to transfer bed to/from bedside chair with RW with minimal assist.-not met  6. Pt to up/down 6 steps with B UE rails with minimal assist.-not met  7. Pt to perform B LE exs in sitting or supine x 20 reps with family assist.-not met            Pt progressing towards goals. All goals remain appropriate at this time.     Katherin Patterson, PT, DPT  3/13/2017

## 2017-03-13 NOTE — PLAN OF CARE
Problem: Occupational Therapy Goal  Goal: Occupational Therapy Goal  Goals to be met by: 3/25     Patient will increase functional independence with ADLs by performing:    UE Dressing while seated EOB with Set-up Assistance and Minimal Assistance.  LE Dressing while seated EOB with Set-up Assistance and Contact Guard Assistance.  Grooming while seated at sink with Set-up Assistance and Minimal Assistance.  Toileting from bedside commode with Minimal Assistance for hygiene and clothing management.   Sitting at edge of bed x10 minutes for dynamic functional task with Contact Guard Assistance.  Supine to sit with Contact Guard Assistance.  Toilet transfer to bedside commode with Contact Guard Assistance.  CG demo understanding for positioning and bed mobility.    Outcome: Ongoing (interventions implemented as appropriate)  Goals remain appropriate this date. Continue OT POC.     SILVANA Luevano/LAUREN  3/13/2017

## 2017-03-14 LAB
ALBUMIN SERPL BCP-MCNC: 3.3 G/DL
ALP SERPL-CCNC: 60 U/L
ALT SERPL W/O P-5'-P-CCNC: 9 U/L
ANION GAP SERPL CALC-SCNC: 12 MMOL/L
AST SERPL-CCNC: 19 U/L
BASOPHILS # BLD AUTO: 0.02 K/UL
BASOPHILS NFR BLD: 0.3 %
BILIRUB SERPL-MCNC: 1.3 MG/DL
BUN SERPL-MCNC: 12 MG/DL
CALCIUM SERPL-MCNC: 8.9 MG/DL
CHLORIDE SERPL-SCNC: 105 MMOL/L
CO2 SERPL-SCNC: 24 MMOL/L
CREAT SERPL-MCNC: 0.8 MG/DL
DIFFERENTIAL METHOD: ABNORMAL
EOSINOPHIL # BLD AUTO: 0.1 K/UL
EOSINOPHIL NFR BLD: 0.8 %
ERYTHROCYTE [DISTWIDTH] IN BLOOD BY AUTOMATED COUNT: 13.3 %
EST. GFR  (AFRICAN AMERICAN): >60 ML/MIN/1.73 M^2
EST. GFR  (NON AFRICAN AMERICAN): >60 ML/MIN/1.73 M^2
GLUCOSE SERPL-MCNC: 126 MG/DL
HCT VFR BLD AUTO: 35.6 %
HGB BLD-MCNC: 12.3 G/DL
INR PPP: 1.1
LYMPHOCYTES # BLD AUTO: 0.9 K/UL
LYMPHOCYTES NFR BLD: 14.9 %
MAGNESIUM SERPL-MCNC: 1.7 MG/DL
MCH RBC QN AUTO: 35 PG
MCHC RBC AUTO-ENTMCNC: 34.6 %
MCV RBC AUTO: 101 FL
MONOCYTES # BLD AUTO: 0.9 K/UL
MONOCYTES NFR BLD: 14.8 %
NEUTROPHILS # BLD AUTO: 4.1 K/UL
NEUTROPHILS NFR BLD: 68.9 %
PHOSPHATE SERPL-MCNC: 3.5 MG/DL
PLATELET # BLD AUTO: 131 K/UL
PMV BLD AUTO: 9.9 FL
POCT GLUCOSE: 128 MG/DL (ref 70–110)
POCT GLUCOSE: 160 MG/DL (ref 70–110)
POTASSIUM SERPL-SCNC: 3.8 MMOL/L
PROT SERPL-MCNC: 6.3 G/DL
PROTHROMBIN TIME: 11.9 SEC
RBC # BLD AUTO: 3.51 M/UL
SODIUM SERPL-SCNC: 141 MMOL/L
WBC # BLD AUTO: 5.89 K/UL

## 2017-03-14 PROCEDURE — 20000000 HC ICU ROOM

## 2017-03-14 PROCEDURE — 85610 PROTHROMBIN TIME: CPT

## 2017-03-14 PROCEDURE — 99222 1ST HOSP IP/OBS MODERATE 55: CPT | Mod: ,,, | Performed by: NURSE PRACTITIONER

## 2017-03-14 PROCEDURE — 63600175 PHARM REV CODE 636 W HCPCS: Performed by: PSYCHIATRY & NEUROLOGY

## 2017-03-14 PROCEDURE — 25000003 PHARM REV CODE 250: Performed by: ANESTHESIOLOGY

## 2017-03-14 PROCEDURE — 97530 THERAPEUTIC ACTIVITIES: CPT

## 2017-03-14 PROCEDURE — 85025 COMPLETE CBC W/AUTO DIFF WBC: CPT

## 2017-03-14 PROCEDURE — 99232 SBSQ HOSP IP/OBS MODERATE 35: CPT | Mod: ,,, | Performed by: PSYCHIATRY & NEUROLOGY

## 2017-03-14 PROCEDURE — 80053 COMPREHEN METABOLIC PANEL: CPT

## 2017-03-14 PROCEDURE — 84100 ASSAY OF PHOSPHORUS: CPT

## 2017-03-14 PROCEDURE — 97535 SELF CARE MNGMENT TRAINING: CPT

## 2017-03-14 PROCEDURE — 83735 ASSAY OF MAGNESIUM: CPT

## 2017-03-14 PROCEDURE — 93010 ELECTROCARDIOGRAM REPORT: CPT | Mod: ,,, | Performed by: INTERNAL MEDICINE

## 2017-03-14 PROCEDURE — 93005 ELECTROCARDIOGRAM TRACING: CPT

## 2017-03-14 PROCEDURE — 25000003 PHARM REV CODE 250: Performed by: PSYCHIATRY & NEUROLOGY

## 2017-03-14 RX ORDER — POTASSIUM CHLORIDE 20 MEQ/15ML
40 SOLUTION ORAL
Status: DISCONTINUED | OUTPATIENT
Start: 2017-03-14 | End: 2017-03-16

## 2017-03-14 RX ORDER — SODIUM,POTASSIUM PHOSPHATES 280-250MG
2 POWDER IN PACKET (EA) ORAL
Status: DISCONTINUED | OUTPATIENT
Start: 2017-03-14 | End: 2017-03-16

## 2017-03-14 RX ORDER — ACETAMINOPHEN 325 MG/1
TABLET ORAL
Status: DISPENSED
Start: 2017-03-14 | End: 2017-03-15

## 2017-03-14 RX ORDER — POTASSIUM CHLORIDE 20 MEQ/15ML
60 SOLUTION ORAL
Status: DISCONTINUED | OUTPATIENT
Start: 2017-03-14 | End: 2017-03-16

## 2017-03-14 RX ORDER — ESCITALOPRAM OXALATE 20 MG/1
20 TABLET ORAL DAILY
Status: DISCONTINUED | OUTPATIENT
Start: 2017-03-15 | End: 2017-03-22 | Stop reason: HOSPADM

## 2017-03-14 RX ORDER — OLANZAPINE 10 MG/1
10 TABLET, ORALLY DISINTEGRATING ORAL ONCE
Status: COMPLETED | OUTPATIENT
Start: 2017-03-14 | End: 2017-03-14

## 2017-03-14 RX ORDER — ACETAMINOPHEN 325 MG/1
650 TABLET ORAL EVERY 6 HOURS PRN
Status: DISCONTINUED | OUTPATIENT
Start: 2017-03-14 | End: 2017-03-22 | Stop reason: HOSPADM

## 2017-03-14 RX ORDER — LANOLIN ALCOHOL/MO/W.PET/CERES
800 CREAM (GRAM) TOPICAL
Status: DISCONTINUED | OUTPATIENT
Start: 2017-03-14 | End: 2017-03-16

## 2017-03-14 RX ADMIN — OLANZAPINE 10 MG: 10 TABLET, ORALLY DISINTEGRATING ORAL at 06:03

## 2017-03-14 RX ADMIN — QUETIAPINE FUMARATE 25 MG: 25 TABLET, FILM COATED ORAL at 08:03

## 2017-03-14 RX ADMIN — Medication 3 ML: at 10:03

## 2017-03-14 RX ADMIN — TIMOLOL MALEATE 1 DROP: 5 SOLUTION OPHTHALMIC at 09:03

## 2017-03-14 RX ADMIN — METOPROLOL SUCCINATE 25 MG: 25 TABLET, EXTENDED RELEASE ORAL at 09:03

## 2017-03-14 RX ADMIN — ENOXAPARIN SODIUM 40 MG: 100 INJECTION SUBCUTANEOUS at 12:03

## 2017-03-14 RX ADMIN — HALOPERIDOL LACTATE 5 MG: 5 INJECTION, SOLUTION INTRAMUSCULAR at 10:03

## 2017-03-14 RX ADMIN — ESCITALOPRAM 5 MG: 5 TABLET, FILM COATED ORAL at 09:03

## 2017-03-14 RX ADMIN — RAMELTEON 8 MG: 8 TABLET, FILM COATED ORAL at 08:03

## 2017-03-14 RX ADMIN — TIMOLOL MALEATE 1 DROP: 5 SOLUTION OPHTHALMIC at 08:03

## 2017-03-14 RX ADMIN — LATANOPROST 1 DROP: 50 SOLUTION/ DROPS OPHTHALMIC at 08:03

## 2017-03-14 RX ADMIN — HALOPERIDOL LACTATE 5 MG: 5 INJECTION, SOLUTION INTRAMUSCULAR at 03:03

## 2017-03-14 RX ADMIN — QUETIAPINE FUMARATE 25 MG: 25 TABLET, FILM COATED ORAL at 09:03

## 2017-03-14 NOTE — PLAN OF CARE
Problem: Physical Therapy Goal  Goal: Physical Therapy Goal  PT goals for the next 10 visits    1. Pt supine to sit with minimal assist-not met  2. Pt sit to supine with CGA-not met  3. Pt sit to stand with RW with CGA-not met  4. Pt to perform gait 100ft with RW with minimal assist.-not met  5. Pt to transfer bed to/from bedside chair with RW with minimal assist.-not met  6. Pt to up/down 6 steps with B UE rails with minimal assist.-not met  7. Pt to perform B LE exs in sitting or supine x 20 reps with family assist.-not met            Pt progressing towards goals. All goals remain appropriate at this time.     Katherin Patterson, PT, DPT  3/14/2017

## 2017-03-14 NOTE — PT/OT/SLP PROGRESS
Physical Therapy  Treatment    Migue Fraser   MRN: [id number]   Admitting Diagnosis: Traumatic subarachnoid hematoma with loss of consciousness    PT Received On: 17  PT Start Time: 1314     PT Stop Time: 1343    PT Total Time (min): 29 min       Billable Minutes:  Therapeutic Activity 22    Treatment Type: Treatment (co-tx with OT)  PT/PTA: PT             General Precautions: Standard, fall, seizure, aspiration  Orthopedic Precautions: N/A   Braces:      Do you have any cultural, spiritual, Buddhism conflicts, given your current situation?: none noted    Subjective:  Communicated with RN prior to session.  PT attempted 1x in am, but pt sleeping & just received some medicine for relaxation.   PT/OT returned in pm; pt agreeable to participating with therapy.     No complaints stated.   Pt with nonsensical speech/conversation throughout session. Required frequent cueing to attend and complete tasks. Required verbal, tactile, and visual demonstration to complete.  Pt easily distracted and [place].     Pain Ratin/10  Pain Rating Post-Intervention: 0/10    Objective:   Patient found with: pulse ox (continuous), blood pressure cuff, telemetry    Functional Mobility:  Bed Mobility:   Supine to Sit: Maximum Assistance (Assist with trunk and B LE's; increase cueing to complete)  Sit to Supine: Minimum Assistance (assist with B LE onto EOB; CGA at trunk for safety)   Scooting to EOB: Required Max A to get to EOB, demonstrated fear of falling reaction, but relaxed once transfer complete. Demonstrated bilateral lifting of LE's off the floor required cueing to keep on floor.     Transfers:  Sit <> Stand Assistance: Total Assistance (Mod A x2 ppl)  Sit <> Stand Assistive Device:  (B HHA)    Gait:   Gait Distance: Pt ambulated ~4 ft x2 with B HHA and Total A (mod x2 ppl). Demonstrated increase L lateral lean with bilateral knee hyperextension and required assist for balance, weight-shift, and cueing during gait.    Assistance 1: Total assistance  Gait Assistive Device: Hand held assist  Gait Pattern: 2-point gait  Gait Deviation(s): decreased viral, increased time in double stance, decreased step length, decreased stride length, decreased swing-to-stance ratio, decreased toe-to-floor clearance, decreased weight-shifting ability, foot flat, knee hyperextension, backward lean, lateral lean      Balance:   Static Sit: FAIR-: Maintains without assist but inconsistent   Dynamic Sit: FAIR+: Maintains balance through MINIMAL excursions of active trunk motion  Static Stand: POOR: Needs MODERATE assist to maintain  Dynamic stand: POOR: N/A     Therapeutic Activities and Exercises:  Pt tolerated static standing ~10 minutes while performing self-care tasks with OT. PT provided balance and support. Initially, pt stood with NBOS & required cueing to widen stance for better balance & decrease posterior lean. Noted bilateral knee hyperextension & leaning against bed this visit. During dynamic UE/self-care activities, pt continued to demonstrated a posterior lean requiring PT support for safety.      Education:  Education provided to pt regarding: safety with mobility; orientation. Verbalized understanding.     Whiteboard updated with correct mobility information. RN/PCT notified.  Pt ok to transfer via stand pivot with RN/PCT if awake and cooperative. Use 1 person assist.       AM-PAC 6 CLICK MOBILITY  How much help from another person does this patient currently need?   1 = Unable, Total/Dependent Assistance  2 = A lot, Maximum/Moderate Assistance  3 = A little, Minimum/Contact Guard/Supervision  4 = None, Modified [place]/Independent    Turning over in bed (including adjusting bedclothes, sheets and blankets)?: 3  Sitting down on and standing up from a chair with arms (e.g., wheelchair, bedside commode, etc.): 2  Moving from lying on back to sitting on the side of the bed?: 2  Moving to and from a bed to a chair (including a  wheelchair)?: 2  Need to walk in hospital room?: 2  Climbing 3-5 steps with a railing?: 1  Total Score: 12    AM-PAC Raw Score CMS G-Code Modifier Level of Impairment Assistance   6 % Total / Unable   7 - 9 CM 80 - 100% Maximal Assist   10 - 14 CL 60 - 80% Moderate Assist   15 - 19 CK 40 - 60% Moderate Assist   20 - 22 CJ 20 - 40% Minimal Assist   23 CI 1-20% SBA / CGA   24 CH 0% Independent/ Mod I     Patient left supine with all lines intact, call button in reach and sitter present.    Assessment:  Migue Fraser is a 89 y.o. male with a medical diagnosis of Traumatic subarachnoid hematoma with loss of consciousness and presents with decrease endurance, strength, balance, coordination, and cognition impairing overall safety with mobility. Pt tolerated session well and continues to progress towards goals.  Pt demonstrated decrease balance and strength this visit. He required additional assistance to complete mobility compared to the previous session. Progress towards goals limited/impacted by cognition.  Pt would benefit from continued skilled physical therapy to address listed impairments, improve functional independence, and maximize safety with mobility.  PT recommends dc disposition to SNF for further strengthening and mobility prior to d/c home. Pt has numerous steps to enter home and at this time pt's wife is unable to physically assist.  Pt demonstrates good potential to progress and would benefit from daily therapy to maximize recovery to gain functional independence & maximize pt & caregiver safety prior to d/c home.      Rehab identified problem list/impairments: Rehab identified problem list/impairments: weakness, impaired endurance, impaired functional mobilty, gait instability, impaired cognition, decreased safety awareness, decreased coordination, decreased upper extremity function, decreased lower extremity function, impaired self care skills, impaired balance    Rehab potential is  good.    Activity tolerance: Good    Discharge recommendations: Discharge Facility/Level Of Care Needs: nursing facility, skilled     Barriers to discharge: Barriers to Discharge: Inaccessible home environment, Decreased caregiver support (pt requires increase assist at this time; numerous stairs to enter)    Equipment recommendations: Equipment Needed After Discharge:  (TBD pending progress)     GOALS:   Physical Therapy Goals        Problem: Physical Therapy Goal    Goal Priority Disciplines Outcome Goal Variances Interventions   Physical Therapy Goal     PT/OT, PT Ongoing (interventions implemented as appropriate)     Description:  PT goals for the next 10 visits    1. Pt supine to sit with minimal assist-not met  2. Pt sit to supine with CGA-not met  3. Pt sit to stand with RW with CGA-not met  4. Pt to perform gait 100ft with RW with minimal assist.-not met  5. Pt to transfer bed to/from bedside chair with RW with minimal assist.-not met  6. Pt to up/down 6 steps with B UE rails with minimal assist.-not met  7. Pt to perform B LE exs in sitting or supine x 20 reps with family assist.-not met                PLAN:    Patient to be seen 5 x/week  to address the above listed problems via gait training, therapeutic activities, therapeutic exercises, neuromuscular re-education  Plan of Care expires: 04/11/17  Plan of Care reviewed with: patient (spouse asleep on sofa in room)         Katherin Patterson, PT  03/14/2017

## 2017-03-14 NOTE — PROGRESS NOTES
ICU Attending Note  Neurocritical Care    [id number]    -escitalopram 20 mg qhs  -quetiapine  -sitter  -stop dexmedetomidine  -plan anticoagulation 2 wks from 3/13 per Neurosurgery  -enoxaparin prophyalxis    Goal: Transfer to floor on Medicine with sitter.

## 2017-03-14 NOTE — PLAN OF CARE
SW spoke with Pt wife and family to address questions. Pt wife is starting to feel that SNF may be a good idea but does not want NH placement. She may want [facility] is they have SNF or OSNF. Advised her to be present when therapy comes as they can answer questions specific to recovery based on therapy needs. She will be here at 8am and will ask them questions to better make her decision.    Pebbles Perkins, ALONDRA  Neurocritical Care   [facility]  [id number]

## 2017-03-14 NOTE — PLAN OF CARE
Problem: Patient Care Overview  Goal: Plan of Care Review  Outcome: Ongoing (interventions implemented as appropriate)  POC reviewed with pt and family at 1400. Spouse and family verbalized understanding. Questions and concerns addressed. No acute events today. Pt progressing toward goals. Will continue to monitor. See flowsheets for full assessment and VS info.

## 2017-03-14 NOTE — PLAN OF CARE
Problem: Occupational Therapy Goal  Goal: Occupational Therapy Goal  Goals to be met by: 3/25     Patient will increase functional independence with ADLs by performing:    UE Dressing while seated EOB with Set-up Assistance and Minimal Assistance.  LE Dressing while seated EOB with Set-up Assistance and Contact Guard Assistance.  Grooming while seated at sink with Set-up Assistance and Minimal Assistance.  Toileting from bedside commode with Minimal Assistance for hygiene and clothing management.   Sitting at edge of bed x10 minutes for dynamic functional task with Contact Guard Assistance.  Supine to sit with Contact Guard Assistance.  Toilet transfer to bedside commode with Contact Guard Assistance.  CG demo understanding for positioning and bed mobility.    Outcome: Ongoing (interventions implemented as appropriate)  Goals remain appropriate this date. Continue OT POC.     SILVANA Luevano/LAUREN  3/14/2017

## 2017-03-14 NOTE — NURSING
Made an attempt to call report to receiving nurse. Told to call back in 1 hour due to short staffing.

## 2017-03-14 NOTE — PT/OT/SLP PROGRESS
Occupational Therapy  Treatment    Migue Fraser   MRN: [id number]   Admitting Diagnosis: Traumatic subarachnoid hematoma with loss of consciousness    OT Date of Treatment: 17   OT Start Time: 1313  OT Stop Time: 1343  OT Total Time (min): 30 min  Co-tx with PT    Billable Minutes:  Self Care/Home Management 30    General Precautions: Standard, fall, seizure, aspiration  Orthopedic Precautions: N/A  Braces: N/A         Subjective:  Communicated with nsg prior to session.  Pt agreeable to participate with therapy.    Pain Ratin/10              Pain Rating Post-Intervention: 0/10    Objective:  Patient found with: pulse ox (continuous), telemetry, blood pressure cuff     Functional Mobility:  Bed Mobility:  Scooting/Bridging: Maximum Assistance (to scoot toward EOB)  Supine to Sit: Maximum Assistance with max cues for task initiation  Sit to Supine: Minimum Assistance for BLE management    Transfers:   Sit <> Stand Assistance: Total Assistance (Mod A x2 person assist) x2 trials from EOB  Sit <> Stand Assistive Device: No Assistive Device    Functional Ambulation: Mod A x2 person assist with B HHA within room; pt with L lateral lean; assist for balance and for navigating environment    Activities of Daily Living:  UE Dressing Level of Assistance: Maximum assistance to jerrell gown while seated EOB 2* cognition and poor command following.  LE Dressing Level of Assistance: Total assistance to jerrell B socks while seated EOB. Max cues for task initiation and sequencing.  Grooming Position: Standing  Grooming Level of Assistance: Maximum assistance to wash face with RUE and brush teeth. Assist for balance, task initiation/sequencing, problem solving, and for thoroughness.    Balance:   Static Sit: FAIR+: Able to take MINIMAL challenges from all directions  Dynamic Sit: FAIR+: Maintains balance through MINIMAL excursions of active trunk motion  Static Stand: POOR: Needs MODERATE assist to maintain  Dynamic stand:  POOR: N/A    Therapeutic Activities and Exercises:  Pt performed ~8 min dynamic standing while completing grooming with Mod A for balance. Pt with narrow INOCENCIA and cued to widen INOCENCIA for increased balance demonstrating poor understanding. L lateral lean noted intermittently. Pt with urinary incontinent episode towards the end of grooming, requiring immediate clean up.    AM-PAC 6 CLICK ADL   How much help from another person does this patient currently need?   1 = Unable, Total/Dependent Assistance  2 = A lot, Maximum/Moderate Assistance  3 = A little, Minimum/Contact Guard/Supervision  4 = None, Modified [place]/Independent    Putting on and taking off regular lower body clothing? : 2  Bathing (including washing, rinsing, drying)?: 2  Toileting, which includes using toilet, bedpan, or urinal? : 2  Putting on and taking off regular upper body clothing?: 2  Taking care of personal grooming such as brushing teeth?: 2  Eating meals?: 2  Total Score: 12     AM-PAC Raw Score CMS G-Code Modifier Level of Impairment Assistance   6 % Total / Unable   7 - 9 CM 80 - 100% Maximal Assist   10 - 14 CL 60 - 80% Moderate Assist   15 - 19 CK 40 - 60% Moderate Assist   20 - 22 CJ 20 - 40% Minimal Assist   23 CI 1-20% SBA / CGA   24 CH 0% Independent/ Mod I     Patient left supine with all lines intact, call button in reach and sitter and wife present    ASSESSMENT:  Migue Fraser is a 89 y.o. male with a medical diagnosis of Traumatic subarachnoid hematoma with loss of consciousness and presents with good participation and motivation during session. Pt required increased assistance with functional transfers/mobility this date. Pt continues to benefit from skilled OT to improve deficits noted below to increase (I) and safety with ADL performance.     Rehab identified problem list/impairments: Rehab identified problem list/impairments: weakness, impaired endurance, impaired self care skills, impaired functional  mobilty, impaired balance, impaired cognition, decreased coordination, decreased upper extremity function, decreased lower extremity function, decreased safety awareness, impaired fine motor    Rehab potential is good.    Activity tolerance: Good    Discharge recommendations: Discharge Facility/Level Of Care Needs: nursing facility, skilled     Barriers to discharge: Barriers to Discharge: Inaccessible home environment, Decreased caregiver support    Equipment recommendations:  (TBD pending progress)     GOALS:   Occupational Therapy Goals        Problem: Occupational Therapy Goal    Goal Priority Disciplines Outcome Interventions   Occupational Therapy Goal     OT, PT/OT Ongoing (interventions implemented as appropriate)    Description:  Goals to be met by: 3/25     Patient will increase functional independence with ADLs by performing:    UE Dressing while seated EOB with Set-up Assistance and Minimal Assistance.  LE Dressing while seated EOB with Set-up Assistance and Contact Guard Assistance.  Grooming while seated at sink with Set-up Assistance and Minimal Assistance.  Toileting from bedside commode with Minimal Assistance for hygiene and clothing management.   Sitting at edge of bed x10 minutes for dynamic functional task with Contact Guard Assistance.  Supine to sit with Contact Guard Assistance.  Toilet transfer to bedside commode with Contact Guard Assistance.  CG demo understanding for positioning and bed mobility.                 Plan:  Patient to be seen 4 x/week to address the above listed problems via self-care/home management, therapeutic activities, therapeutic exercises, neuromuscular re-education, cognitive retraining  Plan of Care expires: 04/10/17  Plan of Care reviewed with: patient, spouse         Jazzmine Hammer OTR/LAUREN  03/14/2017

## 2017-03-14 NOTE — PROGRESS NOTES
Progress Note  Neuro Critical Care    Admit Date: 3/10/2017   Length of Stay:  LOS: 3 days     SUBJECTIVE:   Follow-up For: Traumatic subarachnoid hematoma with loss of consciousness    Interval History:  Concerns of agitation overnight; received haldol and zyprexa; started precedex overnight; d/c at 3am - Off precedex this morning and doing well    Brief HPI:  89 yo M with PMHx of SSS and A-fib (on Elaquis), prostate cancer, and dementia who was experienced an unwitnessed fall at home earlier today. Pt was found down in his back yard by his son. CT scan demonstrated multiple parenchymal hemorrhages with superimposed SAH. Pt has dementia at baseline but according to son is markedly more confused than usual    Review of Systems:  Respiratory: no cough or shortness of breath  Cardiovascular: no chest pain or palpitations  Gastrointestinal: no nausea or vomiting, no abdominal pain or change in bowel habits    OBJECTIVE:     Vital Signs (Most Recent):  Temp: 97.6 °F (36.4 °C) (03/14/17 0300)  Pulse: 67 (03/14/17 0600)  Resp: 19 (03/14/17 0600)  BP: (!) 113/54 (03/14/17 0600)  SpO2: 100 % (03/14/17 0600) Vital Signs Range (Last 24H):  Temp:  [97.2 °F (36.2 °C)-97.9 °F (36.6 °C)]   Pulse:  []   Resp:  [14-43]   BP: ()/()   SpO2:  [95 %-100 %]      Body mass index is 23.9 kg/(m^2).     I & O (Last 24H):    Intake/Output Summary (Last 24 hours) at 03/14/17 0750  Last data filed at 03/14/17 0500   Gross per 24 hour   Intake           502.58 ml   Output              345 ml   Net           157.58 ml        Physical Exam:  · Gen: Well-developed, non-distressed, not diaphoretic  · HEENT/Neck: NC/AT, OP clear, MMM, PERRL, EOMI, no scleral icterus; neck supple, no tracheal deviation, JVD  · CV: RRR, no m/r/g   · Pulm/Chest: CTAB, no w/r/r/crackles  · GI: S/NT/ND, BS normoactive  · Neuro/Psych: alert and oriented to himself, no lateralizing findings in CNs, sensation, strength or tone throughout  · Skin/MSK: warm,  dry, no erythema, rash, pallor; no c/c/e, no atrophy    Laboratory:    Recent Labs  Lab 03/12/17  0345 03/13/17  0401 03/14/17  0350   WBC 4.83 4.56 5.89   HGB 12.8* 12.5* 12.3*   HCT 37.8* 36.7* 35.6*   * 140* 131*         Recent Labs  Lab 03/12/17  0345 03/13/17  0401 03/14/17  0350    141 141   K 3.9 3.6 3.8    107 105   CO2 25 26 24   BUN 13 12 12   CREATININE 0.8 0.9 0.8   CALCIUM 9.0 8.7 8.9   PROT 6.2 6.1 6.3   BILITOT 1.7* 1.3* 1.3*   ALKPHOS 62 62 60   ALT 9* 8* 9*   AST 15 16 19         Recent Labs  Lab 03/12/17 0345 03/13/17  0401 03/14/17  0350   MG 1.7 1.8 1.7   PHOS 3.2 2.7 3.5         Recent Labs  Lab 03/12/17 0345 03/13/17  0401 03/14/17  0350   INR 1.2 1.1 1.1         Microbiology Results (last 7 days)     ** No results found for the last 168 hours. **          ASSESSMENT/PLAN:   Hospital Problems:  Active Hospital Problems    Diagnosis  POA    *Traumatic subarachnoid hematoma with loss of consciousness [S06.6X9A]  Yes    SAH (subarachnoid hemorrhage) [I60.9]  Yes    Agitation requiring sedation protocol [R45.1]  Yes    Contusion of left side of brain without loss of consciousness [S06.320A]  Yes    Contusion of right side of brain without loss of consciousness [S06.310A]  Yes    Abnormal coagulation profile [R79.1]  Yes    Current use of long term anticoagulation [Z79.01]  Not Applicable    Atrial fibrillation [I48.91]  Yes    Sick sinus syndrome [I49.5]  Yes      Resolved Hospital Problems    Diagnosis Date Resolved POA    SAH (subarachnoid hemorrhage) [I60.9] 03/11/2017 Yes       Primary Diagnosis:  Traumatic subarachnoid hematoma with loss of consciousness    Plan:  Neuro:     Subarachnoid bleed  - traumatic; small  - on eliquis at home; received kaycentra on admit  - CT head without contrast (3/12) - stable   - CT spine - no active issues    Vascular dementia  - sees marlyn burns in clinic  - lexapro - continued home med here    Hospital acquired delirium  -  seroquel 25mg BID  - PRN haldol     Pulmonary:     On RA, no active issues  CXR (3/11): no active issues    Cardiac:   ECHO (3/11): EF 55%, +diastolic dysfunction    A fib:   metoprolol 25mg qd  Holding eliquis    Renal:   I & O (Last 24H):    Intake/Output Summary (Last 24 hours) at 03/14/17 0750  Last data filed at 03/14/17 0500   Gross per 24 hour   Intake           502.58 ml   Output              345 ml   Net           157.58 ml     BUN/Cr 12/0.9  - output is not accurate - as patient has condom catheter    Infectious Disease:   No leukocytosis, afebrile    Hematology/Oncology:  H/H stable  Platelets: [id number]  PT/INR: 1.1/11.9     Endocrine:   Hba1c 6.0  TSH WNL  POCT glucose     Fluids/Electrolytes/Nutrition/GI:   IVF d/c, on regular diet    Prophylaxis:  lovenox    Lines:  Peripheral IV  Condom catheter 3/11    Dispo: patient to be stepped down to the floor today. PT/OT recommending SNF. Discussed with NSGY; with plans to restart Eliquis 2 weeks from 2/13    Terrance San  PGY 2

## 2017-03-14 NOTE — CONSULTS
Consult Note  Physical Medicine & Rehab    Consult Requested By:  Jg Le MD      Admit Date:  3/10/2017  Admitting Diagnosis:  SAH (subarachnoid hemorrhage) [I60.9], Cerebral contusion, unspecified laterality, with loss of consciousness of unspecified duration, initial encounter [S06.339A]    Reason for Consult:  Assess rehab needs    SUBJECTIVE:   History of Present Illness:  Migue Fraser is a 89-year-old male with PMHx of a-fib (on Eliquis), sick sinus syndrome, glaucoma, dementia, and prostate cancer.  [place] [facility] [place] for head trauma following an unwitnessed fall with unknown LOC.  CTH showed multiple parenchymal hemorrhages with superimposed SAH.  Evaluated by neurosurgery and no acute intervention necessary.  Hospital course complicated by agitation.      Current Functional Status:  Evaluated by therapy.  Sit to stand Marisela and RW and transfers Marisela and RW.  Ambulated 5 ft CGA and RW.   LBD maxA.     Functional History: Patient lives in [place] with his wife in a 2 story home with 6 steps to enter.  Bed and bath on 2nd floor.  Prior to admission, he required assistance with ADLs and ambulated with SC.  DME: SC, RW.    Review of Systems  Unable to obtain 2/2 mental status.     Past Medical History:   Diagnosis Date    A-fib     Atrial fibrillation     Blastomycosis     Cancer     prostate CA    Glaucoma     Sick sinus syndrome      Past Surgical History:   Procedure Laterality Date    APPENDECTOMY      CHOLECYSTECTOMY      HERNIA REPAIR      JOINT REPLACEMENT       No family history on file.  Social History   Substance Use Topics    Smoking status: Never Smoker    Smokeless tobacco: None    Alcohol use Yes      Comment: 1 drink a night     Review of patient's allergies indicates:   Allergen Reactions    Penicillins         Scheduled Medications:   acetaminophen        enoxaparin  40 mg Subcutaneous Daily    [START ON 3/15/2017] escitalopram oxalate  20 mg Oral  Daily    latanoprost  1 drop Both Eyes QHS    metoprolol succinate  25 mg Oral Daily    quetiapine  25 mg Oral BID    sodium chloride 0.9%  3 mL Intravenous Q8H    timolol maleate 0.5%  1 drop Both Eyes BID     PRN Medications:  acetaminophen, haloperidol lactate, ondansetron, ramelteon    OBJECTIVE:     Vital Signs (Most Recent)  Temp: 97.8 °F (36.6 °C) (03/14/17 1103)  Pulse: 74 (03/14/17 1300)  Resp: 20 (03/14/17 1300)  BP: 130/72 (03/14/17 1300)  SpO2: 96 % (03/14/17 1300)    Vital Signs Range (Last 24H):  Temp:  [97.6 °F (36.4 °C)-98 °F (36.7 °C)]   Pulse:  []   Resp:  [14-43]   BP: ()/()   SpO2:  [95 %-100 %]     Physical Exam:  Vital signs reviewed.   General appearance:  No apparent distress.  Appears eldery, frail.   Skin:  Color and texture normal.  Warm and dry.  No jaundice.  No visible rashes or visible lesions.  HEENT:  Normocephalic and atraumatic.  No scleral icterus.  No nasal discharge.   Pulmonary:  Normal respiratory effort.   Cardiac:  Normal heart rate.  Extremities: No calf tenderness.  Distal pulses intact.  No edema.    Abdomen:  Soft, non-tender and non-distended.  Musculoskeletal:  Moves all extremities spontaneously.   Neurologic:  Lethargic.  Opens eyes to voice.  Oriented to person.   Behavioral/Psych: Restless.     Diagnostic Results:  CT: Reviewed  Labs: Reviewed    ASSESSMENT/PLAN:      Migue Fraser is a 89 y.o. male admitted on 3/10/2017 for head trauma following an unwitnessed fall with unknown LOC.  CTH showed multiple parenchymal hemorrhages with superimposed SAH.  Evaluated by neurosurgery and no acute intervention necessary.  Hospital course complicated by agitation.      PM&R consulted to assess rehab needs.     Functional status: Sit to stand Marisela and RW and transfers Marisela and RW.  Ambulated 5 ft CGA and RW.   LBD maxA.   Cognitive/Speech/Language status:  Cognitive-Linguistic Impairment at baseline.  Nutrition/Swallow Status:  Passed bedside  swallow evaluation.  Speech recommended regular diet and thin liquids.    -  Reviewed discharge options with patient (inpatient rehab, SNF, home health therapy, and outpatient therapy).  Encourage participation with therapy.  -  Recommendations  · PT and OT evaluate and treat.   · SLP cognitive and speech evaluate and treat.   · Mobility, OOB in chair at least 3 hours per day, and early ambulation as appropriate.  · Establish consistent sleep-wake cycle and monitor for sleep disturbances.  · DVT prophylaxis:  Lovenox scheduled.    Patient with rehab goals.  Agree with therapy recommendations for SNF.  Family preferring to discharge patient home with home health therapy.  Will sign off.  Please call with questions/concerns or re-consult if situation changes.    Thank you for consult.    PETER Talley, FNP-C    Physical Medicine & Rehabilitation   03/14/2017  Spectralink: [id number]    Collaborating Physician : Grady Zarco MD

## 2017-03-14 NOTE — NURSING
MD notified of pt's agitated/combative behavior. Transfer orders held. Zyprexa and haldol given to pt. Continuing to monitor.

## 2017-03-14 NOTE — PROGRESS NOTES
Progress Note  Neurosurgery    Admit Date: 3/10/2017  Post-operative Day:    Hospital Day: 5    SUBJECTIVE:     Migue Fraser is a 89 y.o. male s/p fall on eliquis for afib with bilateral tSAH.  Follow-up For:  * No surgery found *  Given haldol, zyprex and started precedex d/t agitation this am.  Very lethargic this am.  Scheduled Meds:   enoxaparin  40 mg Subcutaneous Daily    escitalopram oxalate  5 mg Oral Daily    latanoprost  1 drop Both Eyes QHS    metoprolol succinate  25 mg Oral Daily    quetiapine  25 mg Oral BID    sodium chloride 0.9%  3 mL Intravenous Q8H    timolol maleate 0.5%  1 drop Both Eyes BID     Continuous Infusions:   dexmedetomidine (PRECEDEX) infusion Stopped (03/14/17 0400)     PRN Meds:haloperidol lactate, ondansetron, ramelteon    Review of patient's allergies indicates:   Allergen Reactions    Penicillins        OBJECTIVE:     Vital Signs (Most Recent)  Temp: 97.6 °F (36.4 °C) (03/14/17 0300)  Pulse: 64 (03/14/17 0800)  Resp: (!) 21 (03/14/17 0800)  BP: (!) 104/55 (03/14/17 0800)  SpO2: 96 % (03/14/17 0800)    Vital Signs Range (Last 24H):  Temp:  [97.2 °F (36.2 °C)-97.9 °F (36.6 °C)]   Pulse:  []   Resp:  [14-43]   BP: ()/()   SpO2:  [95 %-100 %]     I & O (Last 24H):    Intake/Output Summary (Last 24 hours) at 03/14/17 1044  Last data filed at 03/14/17 0500   Gross per 24 hour   Intake           217.58 ml   Output              240 ml   Net           -22.42 ml     Physical Exam:  E1V2M5, resisting eye opening  PERRL  Localizing in all 4s.  Responds to noxious stimuli throughout     Lines/Drains:       Peripheral IV - Single Lumen 03/11/17 Right Antecubital (Active)   Site Assessment Clean;Dry;Intact;No redness;No swelling 3/12/2017  3:00 AM   Line Status Blood return noted;Flushed 3/12/2017  3:00 AM   Dressing Status Clean;Dry;Intact 3/12/2017  3:00 AM   Dressing Intervention Dressing reinforced 3/12/2017  3:00 AM   Dressing Change Due 03/14/17  3/12/2017  3:00 AM   Site Change Due 03/14/17 3/12/2017  3:00 AM   Reason Not Rotated Not due 3/12/2017  3:00 AM   Number of days:1            Peripheral IV - Single Lumen 03/11/17 0800 Left Forearm (Active)   Site Assessment Clean;Dry;Intact;No redness;No swelling 3/12/2017  3:00 AM   Line Status Blood return noted;Flushed;Saline locked 3/12/2017  3:00 AM   Dressing Status Clean;Dry;Intact 3/12/2017  3:00 AM   Dressing Intervention New dressing 3/12/2017  3:00 AM   Dressing Change Due 03/15/17 3/12/2017  3:00 AM   Site Change Due 03/15/17 3/12/2017  3:00 AM   Reason Not Rotated Not due 3/12/2017  3:00 AM   Number of days:0            External Urinary Catheter 03/11/17 1100 Small (Active)   Collection Container Urimeter 3/12/2017  3:00 AM   Securement Method secured to top of thigh w/ tape 3/12/2017  3:00 AM   Skin no redness;no breakdown 3/12/2017  3:00 AM   Tolerance no signs/symptoms of discomfort 3/12/2017  3:00 AM   Output (mL) 100 mL 3/12/2017  6:00 AM   Number of days:0       Wound/Incision:  na    Laboratory:  CBC:     Recent Labs  Lab 03/14/17  0350   WBC 5.89   RBC 3.51*   HGB 12.3*   HCT 35.6*   *   *   MCH 35.0*   MCHC 34.6     CMP:     Recent Labs  Lab 03/14/17  0350   *   CALCIUM 8.9   ALBUMIN 3.3*   PROT 6.3      K 3.8   CO2 24      BUN 12   CREATININE 0.8   ALKPHOS 60   ALT 9*   AST 19   BILITOT 1.3*     Coagulation:     Recent Labs  Lab 03/14/17  0350   INR 1.1     Cardiac markers:     Recent Labs  Lab 03/11/17  0008   TROPONINI 0.013       Recent Labs  Lab 03/13/17  1537   COLORU Yellow   SPECGRAV 1.015   PHUR 6.0   PROTEINUA Negative   BACTERIA Occasional   NITRITE Negative   LEUKOCYTESUR Negative   UROBILINOGEN Negative         Diagnostic Results:      ASSESSMENT/PLAN:     Assessment: 89 M s/p fall on eliquis for afib with bilateral tSAH.    -repeat CTH ordered for Wednesday to monitor subdural hygroma on L;  CT c spine neg  -Agitation: please wean precedex.  Rec  day/night orientation.  Continue seroquel and escitalopram.  -SBP less than 150  -FEN/GI: ST following; Goal eunatremia;  -SCDs  -needs aggressive PT/OT; OOB for >6hrs per day  -DISPO: SNF

## 2017-03-15 LAB
ALBUMIN SERPL BCP-MCNC: 3.5 G/DL
ALP SERPL-CCNC: 68 U/L
ALT SERPL W/O P-5'-P-CCNC: 11 U/L
ANION GAP SERPL CALC-SCNC: 12 MMOL/L
AST SERPL-CCNC: 19 U/L
BASOPHILS # BLD AUTO: 0.01 K/UL
BASOPHILS NFR BLD: 0.1 %
BILIRUB SERPL-MCNC: 1.5 MG/DL
BUN SERPL-MCNC: 13 MG/DL
CALCIUM SERPL-MCNC: 9.3 MG/DL
CHLORIDE SERPL-SCNC: 101 MMOL/L
CO2 SERPL-SCNC: 28 MMOL/L
CREAT SERPL-MCNC: 0.9 MG/DL
DIFFERENTIAL METHOD: ABNORMAL
EOSINOPHIL # BLD AUTO: 0 K/UL
EOSINOPHIL NFR BLD: 0 %
ERYTHROCYTE [DISTWIDTH] IN BLOOD BY AUTOMATED COUNT: 13 %
EST. GFR  (AFRICAN AMERICAN): >60 ML/MIN/1.73 M^2
EST. GFR  (NON AFRICAN AMERICAN): >60 ML/MIN/1.73 M^2
GLUCOSE SERPL-MCNC: 128 MG/DL
HCT VFR BLD AUTO: 37.8 %
HGB BLD-MCNC: 12.8 G/DL
INR PPP: 1.2
LYMPHOCYTES # BLD AUTO: 0.9 K/UL
LYMPHOCYTES NFR BLD: 12.6 %
MAGNESIUM SERPL-MCNC: 1.8 MG/DL
MCH RBC QN AUTO: 34.7 PG
MCHC RBC AUTO-ENTMCNC: 33.9 %
MCV RBC AUTO: 102 FL
MONOCYTES # BLD AUTO: 1 K/UL
MONOCYTES NFR BLD: 14.4 %
NEUTROPHILS # BLD AUTO: 5.1 K/UL
NEUTROPHILS NFR BLD: 72.8 %
PHOSPHATE SERPL-MCNC: 3.7 MG/DL
PLATELET # BLD AUTO: 149 K/UL
PMV BLD AUTO: 10.1 FL
POCT GLUCOSE: 109 MG/DL (ref 70–110)
POCT GLUCOSE: 140 MG/DL (ref 70–110)
POCT GLUCOSE: 149 MG/DL (ref 70–110)
POTASSIUM SERPL-SCNC: 3.5 MMOL/L
PROT SERPL-MCNC: 6.8 G/DL
PROTHROMBIN TIME: 12.2 SEC
RBC # BLD AUTO: 3.69 M/UL
SODIUM SERPL-SCNC: 141 MMOL/L
WBC # BLD AUTO: 7.06 K/UL

## 2017-03-15 PROCEDURE — 97803 MED NUTRITION INDIV SUBSEQ: CPT

## 2017-03-15 PROCEDURE — 85025 COMPLETE CBC W/AUTO DIFF WBC: CPT

## 2017-03-15 PROCEDURE — 99232 SBSQ HOSP IP/OBS MODERATE 35: CPT | Mod: ,,, | Performed by: NEUROLOGICAL SURGERY

## 2017-03-15 PROCEDURE — 25000003 PHARM REV CODE 250: Performed by: PSYCHIATRY & NEUROLOGY

## 2017-03-15 PROCEDURE — 80053 COMPREHEN METABOLIC PANEL: CPT

## 2017-03-15 PROCEDURE — 85610 PROTHROMBIN TIME: CPT

## 2017-03-15 PROCEDURE — 93010 ELECTROCARDIOGRAM REPORT: CPT | Mod: ,,, | Performed by: INTERNAL MEDICINE

## 2017-03-15 PROCEDURE — 84100 ASSAY OF PHOSPHORUS: CPT

## 2017-03-15 PROCEDURE — 93005 ELECTROCARDIOGRAM TRACING: CPT

## 2017-03-15 PROCEDURE — 20000000 HC ICU ROOM

## 2017-03-15 PROCEDURE — 25000003 PHARM REV CODE 250: Performed by: ANESTHESIOLOGY

## 2017-03-15 PROCEDURE — 83735 ASSAY OF MAGNESIUM: CPT

## 2017-03-15 PROCEDURE — 99232 SBSQ HOSP IP/OBS MODERATE 35: CPT | Mod: ,,, | Performed by: PSYCHIATRY & NEUROLOGY

## 2017-03-15 PROCEDURE — 63600175 PHARM REV CODE 636 W HCPCS: Performed by: PSYCHIATRY & NEUROLOGY

## 2017-03-15 RX ORDER — HALOPERIDOL 5 MG/ML
2 INJECTION INTRAMUSCULAR ONCE
Status: DISCONTINUED | OUTPATIENT
Start: 2017-03-15 | End: 2017-03-17

## 2017-03-15 RX ORDER — QUETIAPINE FUMARATE 25 MG/1
25 TABLET, FILM COATED ORAL NIGHTLY
Status: DISCONTINUED | OUTPATIENT
Start: 2017-03-15 | End: 2017-03-22 | Stop reason: HOSPADM

## 2017-03-15 RX ADMIN — ACETAMINOPHEN 650 MG: 325 TABLET ORAL at 06:03

## 2017-03-15 RX ADMIN — Medication 3 ML: at 10:03

## 2017-03-15 RX ADMIN — ENOXAPARIN SODIUM 40 MG: 100 INJECTION SUBCUTANEOUS at 11:03

## 2017-03-15 RX ADMIN — TIMOLOL MALEATE 1 DROP: 5 SOLUTION OPHTHALMIC at 09:03

## 2017-03-15 RX ADMIN — LATANOPROST 1 DROP: 50 SOLUTION/ DROPS OPHTHALMIC at 09:03

## 2017-03-15 RX ADMIN — QUETIAPINE FUMARATE 25 MG: 25 TABLET, FILM COATED ORAL at 09:03

## 2017-03-15 NOTE — PROGRESS NOTES
Progress Note  Neuro Critical Care    Admit Date: 3/10/2017   Length of Stay:  LOS: 4 days     SUBJECTIVE:   Follow-up For: Traumatic subarachnoid hematoma with loss of consciousness    Interval History:  No events overnight; reported that patient not as agitated the past night    Brief HPI:  89 yo M with PMHx of SSS and A-fib (on Elaquis), prostate cancer, and dementia who was experienced an unwitnessed fall at home earlier today. Pt was found down in his back yard by his son. CT scan demonstrated multiple parenchymal hemorrhages with superimposed SAH. Pt has dementia at baseline but according to son is markedly more confused than usual    Review of Systems:  Respiratory: no cough or shortness of breath  Cardiovascular: no chest pain or palpitations  Gastrointestinal: no nausea or vomiting, no abdominal pain or change in bowel habits    OBJECTIVE:     Vital Signs (Most Recent):  Temp: 99.1 °F (37.3 °C) (03/15/17 0300)  Pulse: 77 (03/15/17 0600)  Resp: 14 (03/15/17 0600)  BP: (!) 149/75 (03/15/17 0600)  SpO2: 100 % (03/15/17 0600) Vital Signs Range (Last 24H):  Temp:  [97.8 °F (36.6 °C)-99.5 °F (37.5 °C)]   Pulse:  []   Resp:  [14-40]   BP: (104-168)/(55-88)   SpO2:  [90 %-100 %]      Body mass index is 24.74 kg/(m^2).     I & O (Last 24H):    Intake/Output Summary (Last 24 hours) at 03/15/17 0736  Last data filed at 03/15/17 0500   Gross per 24 hour   Intake                0 ml   Output              360 ml   Net             -360 ml        Physical Exam:  · Gen: Well-developed, non-distressed, not diaphoretic  · HEENT/Neck: NC/AT, OP clear, MMM, PERRL, EOMI, no scleral icterus; neck supple, no tracheal deviation, JVD  · CV: RRR, no m/r/g   · Pulm/Chest: CTAB, no w/r/r/crackles  · GI: S/NT/ND, BS normoactive  · Neuro/Psych: alert and oriented to himself, no lateralizing findings in CNs, sensation, strength or tone throughout  · Skin/MSK: warm, dry, no erythema, rash, pallor; no c/c/e, no  atrophy    Laboratory:    Recent Labs  Lab 03/13/17  0401 03/14/17  0350 03/15/17  0345   WBC 4.56 5.89 7.06   HGB 12.5* 12.3* 12.8*   HCT 36.7* 35.6* 37.8*   * 131* 149*         Recent Labs  Lab 03/13/17  0401 03/14/17  0350 03/15/17  0345    141 141   K 3.6 3.8 3.5    105 101   CO2 26 24 28   BUN 12 12 13   CREATININE 0.9 0.8 0.9   CALCIUM 8.7 8.9 9.3   PROT 6.1 6.3 6.8   BILITOT 1.3* 1.3* 1.5*   ALKPHOS 62 60 68   ALT 8* 9* 11   AST 16 19 19         Recent Labs  Lab 03/13/17  0401 03/14/17  0350 03/15/17  0345   MG 1.8 1.7 1.8   PHOS 2.7 3.5 3.7         Recent Labs  Lab 03/13/17  0401 03/14/17  0350 03/15/17  0345   INR 1.1 1.1 1.2         Microbiology Results (last 7 days)     ** No results found for the last 168 hours. **          ASSESSMENT/PLAN:   Hospital Problems:  Active Hospital Problems    Diagnosis  POA    *Traumatic subarachnoid hematoma with loss of consciousness [S06.6X9A]  Yes    SAH (subarachnoid hemorrhage) [I60.9]  Yes    Agitation requiring sedation protocol [R45.1]  Yes    Contusion of left side of brain without loss of consciousness [S06.320A]  Yes    Contusion of right side of brain without loss of consciousness [S06.310A]  Yes    Abnormal coagulation profile [R79.1]  Yes    Current use of long term anticoagulation [Z79.01]  Not Applicable    Atrial fibrillation [I48.91]  Yes    Sick sinus syndrome [I49.5]  Yes      Resolved Hospital Problems    Diagnosis Date Resolved POA    SAH (subarachnoid hemorrhage) [I60.9] 03/11/2017 Yes       Primary Diagnosis:  Traumatic subarachnoid hematoma with loss of consciousness    Plan:  Neuro:     Subarachnoid bleed  - traumatic; small  - on eliquis at home; received kaycentra on admit. Plan to restart Eliquis tentatively on 3/27   - CT head without contrast (3/12) - stable   - CT spine - no active issues    Vascular dementia  - sees marlyn burns in clinic  - lexapro - continued home med here    Hospital acquired delirium  -  seroquel 25mg qpm  - PRN haldol     Pulmonary:     On RA, no active issues  CXR (3/11): no active issues    Cardiac:   ECHO (3/11): EF 55%, +diastolic dysfunction    A fib:   metoprolol 25mg qd  Holding eliquis    Renal:   I & O (Last 24H):    Intake/Output Summary (Last 24 hours) at 03/15/17 0736  Last data filed at 03/15/17 0500   Gross per 24 hour   Intake                0 ml   Output              360 ml   Net             -360 ml     BUN/Cr 12/0.9  - output is not accurate - as patient has condom catheter    Infectious Disease:   No leukocytosis, afebrile    Hematology/Oncology:  H/H stable  Platelets: [id number]  PT/INR: 1.1/11.9     Endocrine:   Hba1c 6.0  TSH WNL  POCT glucose     Fluids/Electrolytes/Nutrition/GI:   on regular diet    Prophylaxis:  lovenox    Lines:  Peripheral IV  Condom catheter 3/11    Dispo: patient to be stepped down to medicine when bed available. PT/OT recommending SNF. Discussed with NSGY; with plans to restart Eliquis 2 weeks from 2/13    Terrance San  PGY 2

## 2017-03-15 NOTE — PLAN OF CARE
SW uploaded PASSR and 142 to [employer] just in case plan changes back to SNF in [place].    Pebbles Perkins, ALONDRA  Neurocritical Care   [facility]  [id number]

## 2017-03-15 NOTE — PROGRESS NOTES
[facility]  Adult Nutrition  Progress Note    SUMMARY     Recommendations    Recommendation/Intervention: 1. Ordered Boost Plus to optimize fair meal intake (& per fmly request)  2. Enc adequate meal intake  3. RD to monitor  Goals: PO intake >50%.   Nutrition Goal Status: progressing towards goal  Communication of RD Recs: reviewed with RN    Reason for Assessment    Reason for Assessment: RD follow-up  Diagnosis: other (see comments) (SAH)  Relevent Medical History: prostate CA, dementia         General Information Comments: Nutrition Discharge Planning: Adequate PO intake for optimal nutrition.     Nutrition Prescription Ordered    Current Diet Order: Regular     Nutrition Risk Screen     Nutrition Risk Screen: tube feeding or parenteral nutrition    Nutrition/Diet History       Typical Food/Fluid Intake: Spoke to pt & his fmly member. They report pt w/ ~25-50%; wife requesting Boost/Ensure be ordered  Food Preferences: No [employer]/cultural prefs reported        Factors Affecting Nutritional Intake: decreased appetite     Labs/Tests/Procedures/Meds       Pertinent Labs Reviewed: reviewed  Pertinent Labs Comments: lyes wnl; Alb 3.5  Pertinent Medications Reviewed: reviewed  Pertinent Medications Comments: lovenox    Physical Findings    Overall Physical Appearance: generalized wasting     Oral/Mouth Cavity: tooth/teeth missing  Skin: intact    Anthropometrics       Height (inches): 69.02 in  Weight Method: Bed Scale  Weight (kg): 76 kg  Ideal Body Weight (IBW), Male: 160.12 lb     % Ideal Body Weight, Male (lb): 104.64 lb     BMI (kg/m2): 24.73  BMI Grade: 18.5-24.9 - normal     Estimated/Assessed Needs    Weight Used For Calorie Calculations: 73.4 kg (161 lb 13.1 oz)   Height (cm): 175.3 cm     Energy Need Method: Kern-St Jeor (1.2 PAL: 1663kcal)     RMR (Kern-St. Jeor Equation): 1412.14        Weight Used For Protein Calculations: 73.4 kg (161 lb 13.1 oz)  Protein Requirements: 58-73g  (0.8-1g/kg)    Fluid Need Method: RDA Method (or per MD)        Monitor and Evaluation    Food and Nutrient Intake: energy intake, food and beverage intake  Food and Nutrient Adminstration: diet order     Physical Activity and Function: nutrition-related ADLs and IADLs  Anthropometric Measurements: weight, weight change  Biochemical Data, Medical Tests and Procedures: other (specify) (All labs)  Nutrition-Focused Physical Findings: overall appearance    Nutrition Risk    Level of Risk: moderate    Nutrition Follow-Up    RD Follow-up?: Yes    Assessment and Plan    Nutr Dx/problem: Inadequate evergy intake  Related to/etiology: decreased appetite  As evidenced by: pt/fmly report  Status: New

## 2017-03-15 NOTE — PLAN OF CARE
CM and SW met with patient's wife and caretaker in room to discuss discharge plan.  Per wife, she wants to take patient home with [facility].  Wife stated that she needs a hospital bed 2/2 she has no bed for patient on the first floor of her home.  LISET and ISABELLA cont to follow and arrange when discharge appropriate per MD.     Ani Roque RN, CCRN-K, [facility]  Neuro-Critical Care   [phone]

## 2017-03-15 NOTE — PLAN OF CARE
Problem: Patient Care Overview  Goal: Plan of Care Review  Outcome: Ongoing (interventions implemented as appropriate)  POC reviewed with pt and family at 1400. Pt verbalized understanding. Questions and concerns addressed. No acute events today. Pt progressing toward goals. Will continue to monitor. See flowsheets for full assessment and VS info. Patient awaiting transfer and PT/OT consults, family ordered hospital bed via Ani.

## 2017-03-15 NOTE — PLAN OF CARE
Problem: Patient Care Overview  Goal: Plan of Care Review  Outcome: Ongoing (interventions implemented as appropriate)  POC reviewed with patient. Pt remained confused during the night.  PRN haldol and ramelteon given. CT of the head obtained.   No acute events overnight. Pt progressing toward goals. Will continue to monitor.  See flow sheets for full assessment and VS info

## 2017-03-15 NOTE — PROGRESS NOTES
Progress Note  Neurosurgery    Admit Date: 3/10/2017  Post-operative Day:    Hospital Day: 6    SUBJECTIVE:     Migue Fraser is a 89 y.o. male s/p fall on eliquis for afib with bilateral tSAH.  Follow-up For:  * No surgery found *    NAEON    Scheduled Meds:   enoxaparin  40 mg Subcutaneous Daily    escitalopram oxalate  20 mg Oral Daily    haloperidol lactate  2 mg Intravenous Once    latanoprost  1 drop Both Eyes QHS    metoprolol succinate  25 mg Oral Daily    quetiapine  25 mg Oral BID    sodium chloride 0.9%  3 mL Intravenous Q8H    timolol maleate 0.5%  1 drop Both Eyes BID     Continuous Infusions:     PRN Meds:acetaminophen, haloperidol lactate, magnesium oxide, magnesium oxide, ondansetron, potassium chloride 10%, potassium chloride 10%, potassium chloride 10%, potassium, sodium phosphates, potassium, sodium phosphates, potassium, sodium phosphates, ramelteon    Review of patient's allergies indicates:   Allergen Reactions    Penicillins        OBJECTIVE:     Vital Signs (Most Recent)  Temp: 99.1 °F (37.3 °C) (03/15/17 0300)  Pulse: 77 (03/15/17 0600)  Resp: 14 (03/15/17 0600)  BP: (!) 149/75 (03/15/17 0600)  SpO2: 100 % (03/15/17 0600)    Vital Signs Range (Last 24H):  Temp:  [97.8 °F (36.6 °C)-99.5 °F (37.5 °C)]   Pulse:  []   Resp:  [14-40]   BP: (115-168)/(57-88)   SpO2:  [90 %-100 %]     I & O (Last 24H):    Intake/Output Summary (Last 24 hours) at 03/15/17 0856  Last data filed at 03/15/17 0500   Gross per 24 hour   Intake                0 ml   Output              360 ml   Net             -360 ml     Physical Exam:  E4V3M5, awake  PERRL  Localizing in all 4s.  Responds to noxious stimuli throughout     Lines/Drains:       Peripheral IV - Single Lumen 03/11/17 Right Antecubital (Active)   Site Assessment Clean;Dry;Intact;No redness;No swelling 3/12/2017  3:00 AM   Line Status Blood return noted;Flushed 3/12/2017  3:00 AM   Dressing Status Clean;Dry;Intact 3/12/2017  3:00 AM    Dressing Intervention Dressing reinforced 3/12/2017  3:00 AM   Dressing Change Due 03/14/17 3/12/2017  3:00 AM   Site Change Due 03/14/17 3/12/2017  3:00 AM   Reason Not Rotated Not due 3/12/2017  3:00 AM   Number of days:1            Peripheral IV - Single Lumen 03/11/17 0800 Left Forearm (Active)   Site Assessment Clean;Dry;Intact;No redness;No swelling 3/12/2017  3:00 AM   Line Status Blood return noted;Flushed;Saline locked 3/12/2017  3:00 AM   Dressing Status Clean;Dry;Intact 3/12/2017  3:00 AM   Dressing Intervention New dressing 3/12/2017  3:00 AM   Dressing Change Due 03/15/17 3/12/2017  3:00 AM   Site Change Due 03/15/17 3/12/2017  3:00 AM   Reason Not Rotated Not due 3/12/2017  3:00 AM   Number of days:0            External Urinary Catheter 03/11/17 1100 Small (Active)   Collection Container Urimeter 3/12/2017  3:00 AM   Securement Method secured to top of thigh w/ tape 3/12/2017  3:00 AM   Skin no redness;no breakdown 3/12/2017  3:00 AM   Tolerance no signs/symptoms of discomfort 3/12/2017  3:00 AM   Output (mL) 100 mL 3/12/2017  6:00 AM   Number of days:0       Wound/Incision:  na    Laboratory:  CBC:     Recent Labs  Lab 03/15/17  0345   WBC 7.06   RBC 3.69*   HGB 12.8*   HCT 37.8*   *   *   MCH 34.7*   MCHC 33.9     CMP:     Recent Labs  Lab 03/15/17  0345   *   CALCIUM 9.3   ALBUMIN 3.5   PROT 6.8      K 3.5   CO2 28      BUN 13   CREATININE 0.9   ALKPHOS 68   ALT 11   AST 19   BILITOT 1.5*     Coagulation:     Recent Labs  Lab 03/15/17  0345   INR 1.2     Cardiac markers:     Recent Labs  Lab 03/11/17  0008   TROPONINI 0.013       Recent Labs  Lab 03/13/17  1537   COLORU Yellow   SPECGRAV 1.015   PHUR 6.0   PROTEINUA Negative   BACTERIA Occasional   NITRITE Negative   LEUKOCYTESUR Negative   UROBILINOGEN Negative         Diagnostic Results:  CT head: stable from prior.    ASSESSMENT/PLAN:     Assessment: 89 M s/p fall on eliquis for afib with bilateral tSAH.    -Pt  neuro stable  -Agitation: Rec day/night orientation.  Continue seroquel and escitalopram.  -SBP less than 150  -FEN/GI: ST following; Goal eunatremia;  -SCDs  -needs aggressive PT/OT; OOB for >6hrs per day  -TTF under medicine today.  -DISPO: SNF

## 2017-03-15 NOTE — PLAN OF CARE
CM and SW met with Pt, Pt wife, and family friend. CM will address DME order needs. Pt wife would like [facility] when discharging for HH.    Pebbles Perkins, ALONDRA  Neurocritical Care   [facility]  [id number]

## 2017-03-16 LAB
ALBUMIN SERPL BCP-MCNC: 3.4 G/DL
ALP SERPL-CCNC: 73 U/L
ALT SERPL W/O P-5'-P-CCNC: 10 U/L
ANION GAP SERPL CALC-SCNC: 11 MMOL/L
AST SERPL-CCNC: 17 U/L
BASOPHILS # BLD AUTO: 0.01 K/UL
BASOPHILS NFR BLD: 0.1 %
BILIRUB SERPL-MCNC: 1.6 MG/DL
BUN SERPL-MCNC: 13 MG/DL
CALCIUM SERPL-MCNC: 9.3 MG/DL
CHLORIDE SERPL-SCNC: 100 MMOL/L
CO2 SERPL-SCNC: 28 MMOL/L
CREAT SERPL-MCNC: 0.8 MG/DL
DIFFERENTIAL METHOD: ABNORMAL
EOSINOPHIL # BLD AUTO: 0 K/UL
EOSINOPHIL NFR BLD: 0.1 %
ERYTHROCYTE [DISTWIDTH] IN BLOOD BY AUTOMATED COUNT: 13.1 %
EST. GFR  (AFRICAN AMERICAN): >60 ML/MIN/1.73 M^2
EST. GFR  (NON AFRICAN AMERICAN): >60 ML/MIN/1.73 M^2
GLUCOSE SERPL-MCNC: 137 MG/DL
HCT VFR BLD AUTO: 38.9 %
HGB BLD-MCNC: 13.7 G/DL
INR PPP: 1.2
LYMPHOCYTES # BLD AUTO: 0.9 K/UL
LYMPHOCYTES NFR BLD: 10.1 %
MAGNESIUM SERPL-MCNC: 1.7 MG/DL
MCH RBC QN AUTO: 35.4 PG
MCHC RBC AUTO-ENTMCNC: 35.2 %
MCV RBC AUTO: 101 FL
MONOCYTES # BLD AUTO: 1.2 K/UL
MONOCYTES NFR BLD: 14.1 %
NEUTROPHILS # BLD AUTO: 6.6 K/UL
NEUTROPHILS NFR BLD: 75.4 %
PHOSPHATE SERPL-MCNC: 2.9 MG/DL
PLATELET # BLD AUTO: 157 K/UL
PMV BLD AUTO: 9.7 FL
POCT GLUCOSE: 138 MG/DL (ref 70–110)
POCT GLUCOSE: 145 MG/DL (ref 70–110)
POCT GLUCOSE: 146 MG/DL (ref 70–110)
POCT GLUCOSE: 153 MG/DL (ref 70–110)
POTASSIUM SERPL-SCNC: 3.4 MMOL/L
PROT SERPL-MCNC: 6.9 G/DL
PROTHROMBIN TIME: 12.3 SEC
RBC # BLD AUTO: 3.87 M/UL
SODIUM SERPL-SCNC: 139 MMOL/L
WBC # BLD AUTO: 8.71 K/UL

## 2017-03-16 PROCEDURE — 25000003 PHARM REV CODE 250: Performed by: ANESTHESIOLOGY

## 2017-03-16 PROCEDURE — 25000003 PHARM REV CODE 250: Performed by: PSYCHIATRY & NEUROLOGY

## 2017-03-16 PROCEDURE — 97530 THERAPEUTIC ACTIVITIES: CPT

## 2017-03-16 PROCEDURE — 85025 COMPLETE CBC W/AUTO DIFF WBC: CPT

## 2017-03-16 PROCEDURE — 99232 SBSQ HOSP IP/OBS MODERATE 35: CPT | Mod: ,,, | Performed by: PSYCHIATRY & NEUROLOGY

## 2017-03-16 PROCEDURE — 63600175 PHARM REV CODE 636 W HCPCS: Performed by: PSYCHIATRY & NEUROLOGY

## 2017-03-16 PROCEDURE — 85610 PROTHROMBIN TIME: CPT

## 2017-03-16 PROCEDURE — 97535 SELF CARE MNGMENT TRAINING: CPT

## 2017-03-16 PROCEDURE — 20600001 HC STEP DOWN PRIVATE ROOM

## 2017-03-16 PROCEDURE — 97116 GAIT TRAINING THERAPY: CPT

## 2017-03-16 PROCEDURE — 80053 COMPREHEN METABOLIC PANEL: CPT

## 2017-03-16 PROCEDURE — 83735 ASSAY OF MAGNESIUM: CPT

## 2017-03-16 PROCEDURE — 84100 ASSAY OF PHOSPHORUS: CPT

## 2017-03-16 RX ADMIN — LATANOPROST 1 DROP: 50 SOLUTION/ DROPS OPHTHALMIC at 08:03

## 2017-03-16 RX ADMIN — ESCITALOPRAM 20 MG: 20 TABLET, FILM COATED ORAL at 08:03

## 2017-03-16 RX ADMIN — ENOXAPARIN SODIUM 40 MG: 100 INJECTION SUBCUTANEOUS at 11:03

## 2017-03-16 RX ADMIN — Medication 3 ML: at 01:03

## 2017-03-16 RX ADMIN — HALOPERIDOL LACTATE 5 MG: 5 INJECTION, SOLUTION INTRAMUSCULAR at 12:03

## 2017-03-16 RX ADMIN — SODIUM CHLORIDE, SODIUM LACTATE, POTASSIUM CHLORIDE, AND CALCIUM CHLORIDE 500 ML: .6; .31; .03; .02 INJECTION, SOLUTION INTRAVENOUS at 11:03

## 2017-03-16 RX ADMIN — TIMOLOL MALEATE 1 DROP: 5 SOLUTION OPHTHALMIC at 08:03

## 2017-03-16 RX ADMIN — ACETAMINOPHEN 650 MG: 325 TABLET ORAL at 04:03

## 2017-03-16 RX ADMIN — QUETIAPINE FUMARATE 25 MG: 25 TABLET, FILM COATED ORAL at 08:03

## 2017-03-16 RX ADMIN — Medication 3 ML: at 08:03

## 2017-03-16 RX ADMIN — METOPROLOL SUCCINATE 25 MG: 25 TABLET, EXTENDED RELEASE ORAL at 08:03

## 2017-03-16 NOTE — PROGRESS NOTES
Progress Note  Neurosurgery    Admit Date: 3/10/2017  Post-operative Day:    Hospital Day: 7    SUBJECTIVE:     Migue Fraser is a 89 y.o. male s/p fall on eliquis for afib with bilateral tSAH.  Follow-up For:  * No surgery found *    NAEON    Scheduled Meds:   enoxaparin  40 mg Subcutaneous Daily    escitalopram oxalate  20 mg Oral Daily    haloperidol lactate  2 mg Intravenous Once    lactated ringers  500 mL Intravenous Once    latanoprost  1 drop Both Eyes QHS    metoprolol succinate  25 mg Oral Daily    quetiapine  25 mg Oral QHS    sodium chloride 0.9%  3 mL Intravenous Q8H    timolol maleate 0.5%  1 drop Both Eyes BID     Continuous Infusions:     PRN Meds:acetaminophen, haloperidol lactate, magnesium oxide, magnesium oxide, ondansetron, potassium chloride 10%, potassium chloride 10%, potassium chloride 10%, potassium, sodium phosphates, potassium, sodium phosphates, potassium, sodium phosphates, ramelteon    Review of patient's allergies indicates:   Allergen Reactions    Penicillins        OBJECTIVE:     Vital Signs (Most Recent)  Temp: 97.6 °F (36.4 °C) (03/16/17 1105)  Pulse: 79 (03/16/17 1105)  Resp: (!) 21 (03/16/17 1105)  BP: (!) 88/51 (03/16/17 1105)  SpO2: 99 % (03/16/17 1105)    Vital Signs Range (Last 24H):  Temp:  [97.6 °F (36.4 °C)-98.8 °F (37.1 °C)]   Pulse:  []   Resp:  [16-30]   BP: ()/(51-91)   SpO2:  [93 %-100 %]     I & O (Last 24H):    Intake/Output Summary (Last 24 hours) at 03/16/17 1155  Last data filed at 03/16/17 0500   Gross per 24 hour   Intake                0 ml   Output              525 ml   Net             -525 ml     Physical Exam:  E4V3M5, awake  PERRL  Localizing in all 4s.  Responds to noxious stimuli throughout     Lines/Drains:       Peripheral IV - Single Lumen 03/11/17 Right Antecubital (Active)   Site Assessment Clean;Dry;Intact;No redness;No swelling 3/12/2017  3:00 AM   Line Status Blood return noted;Flushed 3/12/2017  3:00 AM   Dressing  Status Clean;Dry;Intact 3/12/2017  3:00 AM   Dressing Intervention Dressing reinforced 3/12/2017  3:00 AM   Dressing Change Due 03/14/17 3/12/2017  3:00 AM   Site Change Due 03/14/17 3/12/2017  3:00 AM   Reason Not Rotated Not due 3/12/2017  3:00 AM   Number of days:1            Peripheral IV - Single Lumen 03/11/17 0800 Left Forearm (Active)   Site Assessment Clean;Dry;Intact;No redness;No swelling 3/12/2017  3:00 AM   Line Status Blood return noted;Flushed;Saline locked 3/12/2017  3:00 AM   Dressing Status Clean;Dry;Intact 3/12/2017  3:00 AM   Dressing Intervention New dressing 3/12/2017  3:00 AM   Dressing Change Due 03/15/17 3/12/2017  3:00 AM   Site Change Due 03/15/17 3/12/2017  3:00 AM   Reason Not Rotated Not due 3/12/2017  3:00 AM   Number of days:0            External Urinary Catheter 03/11/17 1100 Small (Active)   Collection Container Urimeter 3/12/2017  3:00 AM   Securement Method secured to top of thigh w/ tape 3/12/2017  3:00 AM   Skin no redness;no breakdown 3/12/2017  3:00 AM   Tolerance no signs/symptoms of discomfort 3/12/2017  3:00 AM   Output (mL) 100 mL 3/12/2017  6:00 AM   Number of days:0       Wound/Incision:  na    Laboratory:  CBC:     Recent Labs  Lab 03/16/17 0311   WBC 8.71   RBC 3.87*   HGB 13.7*   HCT 38.9*      *   MCH 35.4*   MCHC 35.2     CMP:     Recent Labs  Lab 03/16/17  0311   *   CALCIUM 9.3   ALBUMIN 3.4*   PROT 6.9      K 3.4*   CO2 28      BUN 13   CREATININE 0.8   ALKPHOS 73   ALT 10   AST 17   BILITOT 1.6*     Coagulation:     Recent Labs  Lab 03/16/17  0311   INR 1.2     Cardiac markers:     Recent Labs  Lab 03/11/17  0008   TROPONINI 0.013       Recent Labs  Lab 03/13/17  1537   COLORU Yellow   SPECGRAV 1.015   PHUR 6.0   PROTEINUA Negative   BACTERIA Occasional   NITRITE Negative   LEUKOCYTESUR Negative   UROBILINOGEN Negative         Diagnostic Results:  CT head: stable from prior.    ASSESSMENT/PLAN:     Assessment: 89 M s/p fall on  eliquis for afib with bilateral tSAH.    -Pt neuro stable  -Agitation: Rec day/night orientation.  Continue seroquel and escitalopram.  -SBP less than 150  -FEN/GI: ST following; Goal eunatremia;  -SCDs  -needs aggressive PT/OT; OOB for >6hrs per day  -TTF under medicine, awaiting bed.  -DISPO: SNF

## 2017-03-16 NOTE — PROGRESS NOTES
Progress Note  Neuro Critical Care    Admit Date: 3/10/2017   Length of Stay:  LOS: 5 days     SUBJECTIVE:   Follow-up For: Traumatic subarachnoid hematoma with loss of consciousness    Interval History:  No events overnight; patient required haldol last night in addition to the scheduled seroquel for agitation    Brief HPI:  89 yo M with PMHx of SSS and A-fib (on Elaquis), prostate cancer, and dementia who was experienced an unwitnessed fall at home earlier today. Pt was found down in his back yard by his son. CT scan demonstrated multiple parenchymal hemorrhages with superimposed SAH. Pt has dementia at baseline but according to son is markedly more confused than usual    Review of Systems:  Respiratory: no cough or shortness of breath  Cardiovascular: no chest pain or palpitations  Gastrointestinal: no nausea or vomiting, no abdominal pain or change in bowel habits    OBJECTIVE:     Vital Signs (Most Recent):  Temp: 97.6 °F (36.4 °C) (03/16/17 1105)  Pulse: 88 (03/16/17 1400)  Resp: 18 (03/16/17 1400)  BP: (!) 95/59 (03/16/17 1400)  SpO2: 100 % (03/16/17 1400) Vital Signs Range (Last 24H):  Temp:  [97.6 °F (36.4 °C)-98.8 °F (37.1 °C)]   Pulse:  []   Resp:  [16-30]   BP: ()/(51-94)   SpO2:  [93 %-100 %]      Body mass index is 24.74 kg/(m^2).     I & O (Last 24H):    Intake/Output Summary (Last 24 hours) at 03/16/17 1442  Last data filed at 03/16/17 1200   Gross per 24 hour   Intake              650 ml   Output              400 ml   Net              250 ml        Physical Exam:  · Gen: Well-developed, non-distressed, not diaphoretic  · HEENT/Neck: NC/AT, OP clear, MMM, PERRL, EOMI, no scleral icterus; neck supple, no tracheal deviation, JVD  · CV: RRR, no m/r/g   · Pulm/Chest: CTAB, no w/r/r/crackles  · GI: S/NT/ND, BS normoactive  · Neuro/Psych: alert and oriented to himself, no lateralizing findings in CNs, sensation, strength or tone throughout  · Skin/MSK: warm, dry, no erythema, rash, pallor; no  c/c/e, no atrophy    Laboratory:    Recent Labs  Lab 03/14/17  0350 03/15/17  0345 03/16/17  0311   WBC 5.89 7.06 8.71   HGB 12.3* 12.8* 13.7*   HCT 35.6* 37.8* 38.9*   * 149* 157         Recent Labs  Lab 03/14/17  0350 03/15/17  0345 03/16/17  0311    141 139   K 3.8 3.5 3.4*    101 100   CO2 24 28 28   BUN 12 13 13   CREATININE 0.8 0.9 0.8   CALCIUM 8.9 9.3 9.3   PROT 6.3 6.8 6.9   BILITOT 1.3* 1.5* 1.6*   ALKPHOS 60 68 73   ALT 9* 11 10   AST 19 19 17         Recent Labs  Lab 03/14/17  0350 03/15/17  0345 03/16/17  0311   MG 1.7 1.8 1.7   PHOS 3.5 3.7 2.9         Recent Labs  Lab 03/14/17  0350 03/15/17  0345 03/16/17  0311   INR 1.1 1.2 1.2         Microbiology Results (last 7 days)     ** No results found for the last 168 hours. **          ASSESSMENT/PLAN:   Hospital Problems:  Active Hospital Problems    Diagnosis  POA    *Traumatic subarachnoid hematoma with loss of consciousness [S06.6X9A]  Yes    SAH (subarachnoid hemorrhage) [I60.9]  Yes    Agitation requiring sedation protocol [R45.1]  Yes    Contusion of left side of brain without loss of consciousness [S06.320A]  Yes    Contusion of right side of brain without loss of consciousness [S06.310A]  Yes    Abnormal coagulation profile [R79.1]  Yes    Current use of long term anticoagulation [Z79.01]  Not Applicable    Atrial fibrillation [I48.91]  Yes    Sick sinus syndrome [I49.5]  Yes      Resolved Hospital Problems    Diagnosis Date Resolved POA    SAH (subarachnoid hemorrhage) [I60.9] 03/11/2017 Yes       Primary Diagnosis:  Traumatic subarachnoid hematoma with loss of consciousness    Plan:  Neuro:     Subarachnoid bleed  - traumatic; small  - on eliquis at home; received kaycentra on admit. Plan to restart Eliquis tentatively on 3/27   - CT head without contrast (3/12) - stable   - CT spine - no active issues    Vascular dementia  - sees marlyn burns in clinic  - lexapro - continued home med [facility] acquired  delirium  - seroquel 25mg qpm  - PRN haldol     Pulmonary:     On RA, no active issues  CXR (3/11): no active issues    Cardiac:   ECHO (3/11): EF 55%, +diastolic dysfunction    A fib:   metoprolol 25mg qd  Holding eliquis    Renal:   I & O (Last 24H):    Intake/Output Summary (Last 24 hours) at 03/16/17 1442  Last data filed at 03/16/17 1200   Gross per 24 hour   Intake              650 ml   Output              400 ml   Net              250 ml     BUN/Cr 12/0.9  - output is not accurate - as patient has condom catheter    Infectious Disease:   No leukocytosis, afebrile    Hematology/Oncology:  H/H stable  Platelets: [id number]  PT/INR: 1.2/12.3     Endocrine:   Hba1c 6.0  TSH WNL  POCT glucose     Fluids/Electrolytes/Nutrition/GI:   on regular diet    Prophylaxis:  lovenox    Lines:  Peripheral IV  Condom catheter 3/11    Dispo: patient to be stepped down to medicine when bed available. PT/OT recommending SNF. Discussed with NSGY; with plans to restart Eliquis 2 weeks from 2/13    Terrance San  PGY 2

## 2017-03-16 NOTE — PLAN OF CARE
Problem: Patient Care Overview  Goal: Plan of Care Review  Outcome: Ongoing (interventions implemented as appropriate)  POC reviewed with patient and daughter.  Pt remained confused through the night, but was more cooperative than the following nights. Questions and concerns addressed. No acute events overnight. Pt progressing toward goals. Will continue to monitor. See flow sheets for full assessment and VS info

## 2017-03-16 NOTE — PT/OT/SLP PROGRESS
Occupational Therapy  Treatment    Migue Fraser   MRN: [id number]   Admitting Diagnosis: Traumatic subarachnoid hematoma with loss of consciousness    OT Date of Treatment: 17   OT Start Time: 0932  OT Stop Time: 1000  OT Total Time (min): 28 min  Co-tx with PT    Billable Minutes:  Self Care/Home Management 28    General Precautions: Standard, fall, seizure, aspiration  Orthopedic Precautions: N/A  Braces: N/A         Subjective:  Communicated with nsg prior to session. Pt agreeable to participate with therapy. Upon arrival, pt required sternal rubs to awaken. Once pt started transitioning from supine>sit he became more alert.      Pain Ratin/10              Pain Rating Post-Intervention: 0/10    Objective:  Patient found with: pulse ox (continuous), blood pressure cuff     Functional Mobility:  Bed Mobility:  Scooting/Bridging: Maximum Assistance (to scoot toward EOB)  Supine to Sit: Total Assistance (with HOB elevated)  Sit to Supine:  Pt left sitting up in BS chair    Transfers:   Sit <> Stand Assistance: Total Assistance (Mod A x2 person assist) from BS chair  Sit <> Stand Assistive Device: Rolling Walker    Bed <> Chair Technique: Stand Pivot  Bed <> Chair Transfer Assistance: Total Assistance (Min A x2 person assist for safety)  Bed <> Chair Assistive Device: Rolling Walker    Functional Ambulation: ~5 steps from EOB>BS chair Min A using RW; assist for balance, safety, and for navigating environment.    Activities of Daily Living:  UE Dressing Level of Assistance: Total assistance to jerrell gown like robe while seated EOB 2* poor command following and impaired cognition; cues for task initiation.  LE Dressing Level of Assistance: Total assistance to jerrell B socks while seated EOB. Pt able to jerrell R sock once past heel. Cues for task initiation and sequencing.  Grooming Position: Standing  Grooming Level of Assistance: Maximum assistance to brush teeth and wash face. Assist for thoroughness,  container management, and balance. Tactile/verbal cues for task initiation and sequencing.    Balance:   Static Sit: FAIR: Maintains without assist, but unable to take any challenges   Dynamic Sit: FAIR: Cannot move trunk without losing balance  Static Stand: POOR+: Needs MINIMAL assist to maintain  Dynamic stand: POOR: N/A    Therapeutic Activities and Exercises:  Pt performed ~6-8 min dynamic standing while completing grooming with Min A for balance 2* posterior lean. Pt required cues intermittently to keep eyes open. Pt utilized unilateral UE support on tray table intermittently for increased balance.    AM-PAC 6 CLICK ADL   How much help from another person does this patient currently need?   1 = Unable, Total/Dependent Assistance  2 = A lot, Maximum/Moderate Assistance  3 = A little, Minimum/Contact Guard/Supervision  4 = None, Modified [place]/Independent    Putting on and taking off regular lower body clothing? : 2  Bathing (including washing, rinsing, drying)?: 2  Toileting, which includes using toilet, bedpan, or urinal? : 2  Putting on and taking off regular upper body clothing?: 2  Taking care of personal grooming such as brushing teeth?: 2  Eating meals?: 2  Total Score: 12     AM-PAC Raw Score CMS G-Code Modifier Level of Impairment Assistance   6 % Total / Unable   7 - 9 CM 80 - 100% Maximal Assist   10 - 14 CL 60 - 80% Moderate Assist   15 - 19 CK 40 - 60% Moderate Assist   20 - 22 CJ 20 - 40% Minimal Assist   23 CI 1-20% SBA / CGA   24 CH 0% Independent/ Mod I     Patient left up in chair with all lines intact, call button in reach, nsg notified and family present    ASSESSMENT:  Migue Fraser is a 89 y.o. male with a medical diagnosis of Traumatic subarachnoid hematoma with loss of consciousness . He presents with good participation and motivation during session. Pt performed UB dressing (donning gown like robe) with Total A, LB dressing (donning socks) with Total A, and  grooming in standing with Max A. Pt continues to benefit from skilled OT to improve deficits noted below to increase (I) and safety with ADL performance.    Rehab identified problem list/impairments: Rehab identified problem list/impairments: weakness, impaired endurance, impaired self care skills, impaired functional mobilty, impaired balance, impaired cognition, decreased coordination, decreased upper extremity function, decreased lower extremity function, decreased safety awareness, impaired fine motor    Rehab potential is good.    Activity tolerance: Good    Discharge recommendations: Discharge Facility/Level Of Care Needs: nursing facility, skilled     Barriers to discharge: Barriers to Discharge: Inaccessible home environment, Decreased caregiver support    Equipment recommendations:  (TBD pending progress)     GOALS:   Occupational Therapy Goals        Problem: Occupational Therapy Goal    Goal Priority Disciplines Outcome Interventions   Occupational Therapy Goal     OT, PT/OT Ongoing (interventions implemented as appropriate)    Description:  Goals to be met by: 3/25     Patient will increase functional independence with ADLs by performing:    UE Dressing while seated EOB with Set-up Assistance and Minimal Assistance.  LE Dressing while seated EOB with Set-up Assistance and Contact Guard Assistance.  Grooming while seated at sink with Set-up Assistance and Minimal Assistance.  Toileting from bedside commode with Minimal Assistance for hygiene and clothing management.   Sitting at edge of bed x10 minutes for dynamic functional task with Contact Guard Assistance.  Supine to sit with Contact Guard Assistance.   Toilet transfer to bedside commode with Contact Guard Assistance.  CG demo understanding for positioning and bed mobility.                  Plan:  Patient to be seen 4 x/week to address the above listed problems via self-care/home management, therapeutic activities, therapeutic exercises, neuromuscular  re-education, cognitive retraining  Plan of Care expires: 04/10/17  Plan of Care reviewed with: patient         Jazzmine Hammer, OTR/L  03/16/2017

## 2017-03-16 NOTE — PLAN OF CARE
Problem: Physical Therapy Goal  Goal: Physical Therapy Goal  PT goals for the next 10 visits    1. Pt supine to sit with minimal assist-not met  2. Pt sit to supine with CGA-not met  3. Pt sit to stand with RW with CGA-not met  4. Pt to perform gait 100ft with RW with minimal assist.-not met  5. Pt to transfer bed to/from bedside chair with RW with minimal assist.-MET 3/16  6. Pt to up/down 6 steps with B UE rails with minimal assist.-not met   7. Pt to perform B LE exs in sitting or supine x 20 reps with family assist.-not met   Outcome: Ongoing (interventions implemented as appropriate)  Pt demonstrating progress towards goals, good participation this date. 1/7 goals met and updated. Continue current POC.      Meg Fermin, PT, DPT   3/16/2017  Pager: [phone]

## 2017-03-16 NOTE — PROGRESS NOTES
ICU Attending Note  Neurocritical Care    E3V4M6    -escitalopram  -quetiapine  -enoxaparin prophylaxis    Goal: Awaiting transfer to floor for 3rd day.

## 2017-03-16 NOTE — PLAN OF CARE
03/16/17 1327   Right Care Assessment   Can the patient answer the patient profile reliably? No, cognitively impaired   How often would a person be available to care for the patient? Whenever needed   Describe the patient's ability to walk at the present time. Major restrictions/daily assistance from another person   How does the patient rate their overall health at the present time? (connie)   Number of comorbid conditions (as recorded on the chart) Three   During the past month, has the patient often been bothered by feeling down, depressed or hopeless? (connie)   During the past month, has the patient often been bothered by little interest or pleasure in doing things? (connie)       Plan A:  Home with  and hospital bed    Plan B:  SNF      Ani Roque RN, CCRN-K, [facility]  Neuro-Critical Care   X [phone]

## 2017-03-16 NOTE — PROGRESS NOTES
Pt transported to unit in stable condition. VSS. Oriented to unit and call bell. Instructed pt and family to call for needs. Both verbalized understanding. Will continue to monitor.

## 2017-03-16 NOTE — PLAN OF CARE
Problem: Occupational Therapy Goal  Goal: Occupational Therapy Goal  Goals to be met by: 3/25     Patient will increase functional independence with ADLs by performing:    UE Dressing while seated EOB with Set-up Assistance and Minimal Assistance.  LE Dressing while seated EOB with Set-up Assistance and Contact Guard Assistance.  Grooming while seated at sink with Set-up Assistance and Minimal Assistance.  Toileting from bedside commode with Minimal Assistance for hygiene and clothing management.   Sitting at edge of bed x10 minutes for dynamic functional task with Contact Guard Assistance.  Supine to sit with Contact Guard Assistance.   Toilet transfer to bedside commode with Contact Guard Assistance.  CG demo understanding for positioning and bed mobility.    Outcome: Ongoing (interventions implemented as appropriate)  Goals remain appropriate. Continue OT POC.     SILVANA Luevano/LAUREN  3/16/2017

## 2017-03-16 NOTE — PT/OT/SLP PROGRESS
Physical Therapy  Treatment    Migue Fraser   MRN: [id number]   Admitting Diagnosis: Traumatic subarachnoid hematoma with loss of consciousness    PT Received On: 17  PT Start Time: 0932     PT Stop Time: 1000    PT Total Time (min): 28 min       Billable Minutes:  Gait [id number] min and Therapeutic Activity 18 min    Treatment Type: Treatment  PT/PTA: PT     PTA Visit Number: 0       General Precautions: Standard, fall, seizure, aspiration  Orthopedic Precautions: N/A   Braces: N/A    Do you have any cultural, spiritual, Gnosticist conflicts, given your current situation?: none noted    Subjective:  Communicated with RN prior to session. Pt asleep upon PT arrival, able to arouse. Pt agreeable to participate in tx session.     Pain Ratin/10  Pain Rating Post-Intervention: 0/10    Objective:   Patient found with: pulse ox (continuous), blood pressure cuff, telemetry, peripheral IV    Functional Mobility:  Bed Mobility:   Supine to Sit: Maximum Assistance    Transfers:  Sit <> Stand Assistance: Other (min A x2 from EOB x 1 trial; mod A x2 from chair x 1 trial)  Sit <> Stand Assistive Device: Rolling Walker    Gait:   Gait Distance: Pt ambulated ~5 steps from bed>chair with RW and min A for balance, safety and cueing for sequencing of LEs; no LOB  Assistance 1: Minimum assistance  Gait Assistive Device: Rolling walker  Gait Pattern: 3-point gait  Gait Deviation(s): decreased viral, decreased step length, decreased stride length, decreased weight-shifting ability, backward lean     Balance:   Static Sit: FAIR: Maintains without assist, but unable to take any challenges   Dynamic Sit: FAIR: Cannot move trunk without losing balance  Static Stand: POOR+: Needs MINIMAL assist to maintain  Dynamic stand: POOR: needs min A to maintain     Therapeutic Activities and Exercises:  Therapeutic activities aimed to increase pt's independence, safety, and efficiency with bed mobility and functional transfers. See  above for assistance levels. In standing, therapist facilitated weight shifting of BLEs with RW use to prepare for gait and improve dynamic standing balance. Pt able to perform marching with BLE clearance x ~1 minute prior to bed>chair transfer. During bed to chair transfer, pt ambulated ~ 5 steps with RW, however demonstrated increase knee and hip flexion bilaterally appearing to mimic marching movement during ambulation; Pt with difficulty correcting gait pattern, seeming to perseverate on marching activity. However, pt able to perform gait to chair with min A for balance, RW management, and cueing for direction to chair. Pt also performed static standing with UE support during dynamic UE tasks including washing face and brushing teeth to improve postural awareness, correct posterior lean and improve balance.       AM-PAC 6 CLICK MOBILITY  How much help from another person does this patient currently need?   1 = Unable, Total/Dependent Assistance  2 = A lot, Maximum/Moderate Assistance  3 = A little, Minimum/Contact Guard/Supervision  4 = None, Modified [place]/Independent    Turning over in bed (including adjusting bedclothes, sheets and blankets)?: 3  Sitting down on and standing up from a chair with arms (e.g., wheelchair, bedside commode, etc.): 2  Moving from lying on back to sitting on the side of the bed?: 2  Moving to and from a bed to a chair (including a wheelchair)?: 3  Need to walk in hospital room?: 3  Climbing 3-5 steps with a railing?: 1  Total Score: 14    AM-PAC Raw Score CMS G-Code Modifier Level of Impairment Assistance   6 % Total / Unable   7 - 9 CM 80 - 100% Maximal Assist   10 - 14 CL 60 - 80% Moderate Assist   15 - 19 CK 40 - 60% Moderate Assist   20 - 22 CJ 20 - 40% Minimal Assist   23 CI 1-20% SBA / CGA   24 CH 0% Independent/ Mod I     Patient left up in chair with all lines intact, call button in reach, RN notified and caregiver present.    Assessment:  Migue Saucedo  Evelio is a 89 y.o. male with a medical diagnosis of Traumatic subarachnoid hematoma with loss of consciousness. Pt demonstrating progress towards mobility goals, with good active participation throughout session today. Pt continues to demonstrate confusion, impaired balance and gait instability and generalized debility impacting participation in functional ambulation and ADLs. Pt would benefit from continued PT intervention to address below listed deficits and maximize return to PLOF. Pt remains a good candidate for SNF upon discharge for continued strengthening and gait/balance training.     Rehab identified problem list/impairments: Rehab identified problem list/impairments: weakness, impaired endurance, impaired functional mobilty, gait instability, impaired balance, impaired self care skills, impaired cognition, decreased safety awareness    Rehab potential is good.    Activity tolerance: Good    Discharge recommendations: Discharge Facility/Level Of Care Needs: nursing facility, skilled     Barriers to discharge: Barriers to Discharge: Inaccessible home environment, Decreased caregiver support    Equipment recommendations: Equipment Needed After Discharge:  (TBD pending progress)     GOALS:   Physical Therapy Goals        Problem: Physical Therapy Goal    Goal Priority Disciplines Outcome Goal Variances Interventions   Physical Therapy Goal     PT/OT, PT Ongoing (interventions implemented as appropriate)     Description:  PT goals for the next 10 visits    1. Pt supine to sit with minimal assist-not met  2. Pt sit to supine with CGA-not met  3. Pt sit to stand with RW with CGA-not met  4. Pt to perform gait 100ft with RW with minimal assist.-not met  5. Pt to transfer bed to/from bedside chair with RW with minimal assist.-MET 3/16  6. Pt to up/down 6 steps with B UE rails with minimal assist.-not met   7. Pt to perform B LE exs in sitting or supine x 20 reps with family assist.-not met                 PLAN:     Patient to be seen 5 x/week  to address the above listed problems via gait training, therapeutic activities, therapeutic exercises, neuromuscular re-education  Plan of Care expires: 04/11/17  Plan of Care reviewed with: patient        Meg Fermin PT, DPT   3/16/2017  Pager: [phone]

## 2017-03-17 PROBLEM — S06.33AA CEREBRAL CONTUSION: Status: ACTIVE | Noted: 2017-03-11

## 2017-03-17 PROBLEM — R53.81 DEBILITY: Status: ACTIVE | Noted: 2017-03-17

## 2017-03-17 PROBLEM — E87.6 HYPOKALEMIA: Status: ACTIVE | Noted: 2017-03-17

## 2017-03-17 LAB
ALBUMIN SERPL BCP-MCNC: 2.9 G/DL
ALP SERPL-CCNC: 65 U/L
ALT SERPL W/O P-5'-P-CCNC: 9 U/L
ANION GAP SERPL CALC-SCNC: 10 MMOL/L
AST SERPL-CCNC: 12 U/L
BASOPHILS # BLD AUTO: 0.01 K/UL
BASOPHILS NFR BLD: 0.1 %
BILIRUB SERPL-MCNC: 1.1 MG/DL
BUN SERPL-MCNC: 19 MG/DL
CALCIUM SERPL-MCNC: 9.1 MG/DL
CHLORIDE SERPL-SCNC: 101 MMOL/L
CO2 SERPL-SCNC: 28 MMOL/L
CREAT SERPL-MCNC: 0.8 MG/DL
DIFFERENTIAL METHOD: ABNORMAL
EOSINOPHIL # BLD AUTO: 0.1 K/UL
EOSINOPHIL NFR BLD: 0.9 %
ERYTHROCYTE [DISTWIDTH] IN BLOOD BY AUTOMATED COUNT: 13.2 %
EST. GFR  (AFRICAN AMERICAN): >60 ML/MIN/1.73 M^2
EST. GFR  (NON AFRICAN AMERICAN): >60 ML/MIN/1.73 M^2
GLUCOSE SERPL-MCNC: 131 MG/DL
HCT VFR BLD AUTO: 37.4 %
HGB BLD-MCNC: 12.8 G/DL
INR PPP: 1.1
LYMPHOCYTES # BLD AUTO: 0.9 K/UL
LYMPHOCYTES NFR BLD: 13.1 %
MAGNESIUM SERPL-MCNC: 1.7 MG/DL
MCH RBC QN AUTO: 35.5 PG
MCHC RBC AUTO-ENTMCNC: 34.2 %
MCV RBC AUTO: 104 FL
MONOCYTES # BLD AUTO: 1 K/UL
MONOCYTES NFR BLD: 13.8 %
NEUTROPHILS # BLD AUTO: 4.9 K/UL
NEUTROPHILS NFR BLD: 71.8 %
PHOSPHATE SERPL-MCNC: 2.5 MG/DL
PLATELET # BLD AUTO: 166 K/UL
PMV BLD AUTO: 10.4 FL
POCT GLUCOSE: 162 MG/DL (ref 70–110)
POTASSIUM SERPL-SCNC: 3.2 MMOL/L
PROT SERPL-MCNC: 6 G/DL
PROTHROMBIN TIME: 12 SEC
RBC # BLD AUTO: 3.61 M/UL
SODIUM SERPL-SCNC: 139 MMOL/L
WBC # BLD AUTO: 6.87 K/UL

## 2017-03-17 PROCEDURE — 99233 SBSQ HOSP IP/OBS HIGH 50: CPT | Mod: ,,, | Performed by: INTERNAL MEDICINE

## 2017-03-17 PROCEDURE — 86580 TB INTRADERMAL TEST: CPT | Performed by: INTERNAL MEDICINE

## 2017-03-17 PROCEDURE — 85610 PROTHROMBIN TIME: CPT

## 2017-03-17 PROCEDURE — 25000003 PHARM REV CODE 250: Performed by: HOSPITALIST

## 2017-03-17 PROCEDURE — 84100 ASSAY OF PHOSPHORUS: CPT

## 2017-03-17 PROCEDURE — 36415 COLL VENOUS BLD VENIPUNCTURE: CPT

## 2017-03-17 PROCEDURE — 63600175 PHARM REV CODE 636 W HCPCS: Performed by: INTERNAL MEDICINE

## 2017-03-17 PROCEDURE — 80053 COMPREHEN METABOLIC PANEL: CPT

## 2017-03-17 PROCEDURE — 25000003 PHARM REV CODE 250: Performed by: ANESTHESIOLOGY

## 2017-03-17 PROCEDURE — 85025 COMPLETE CBC W/AUTO DIFF WBC: CPT

## 2017-03-17 PROCEDURE — 92610 EVALUATE SWALLOWING FUNCTION: CPT

## 2017-03-17 PROCEDURE — 99233 SBSQ HOSP IP/OBS HIGH 50: CPT | Mod: ,,, | Performed by: PHYSICIAN ASSISTANT

## 2017-03-17 PROCEDURE — 63600175 PHARM REV CODE 636 W HCPCS: Performed by: HOSPITALIST

## 2017-03-17 PROCEDURE — 20600001 HC STEP DOWN PRIVATE ROOM

## 2017-03-17 PROCEDURE — 25000003 PHARM REV CODE 250: Performed by: PSYCHIATRY & NEUROLOGY

## 2017-03-17 PROCEDURE — 83735 ASSAY OF MAGNESIUM: CPT

## 2017-03-17 RX ORDER — POTASSIUM CHLORIDE 7.45 MG/ML
10 INJECTION INTRAVENOUS
Status: DISPENSED | OUTPATIENT
Start: 2017-03-17 | End: 2017-03-17

## 2017-03-17 RX ORDER — HALOPERIDOL 5 MG/ML
2 INJECTION INTRAMUSCULAR EVERY 6 HOURS PRN
Status: DISCONTINUED | OUTPATIENT
Start: 2017-03-17 | End: 2017-03-22 | Stop reason: HOSPADM

## 2017-03-17 RX ADMIN — POTASSIUM CHLORIDE 10 MEQ: 10 INJECTION, SOLUTION INTRAVENOUS at 04:03

## 2017-03-17 RX ADMIN — RAMELTEON 8 MG: 8 TABLET, FILM COATED ORAL at 11:03

## 2017-03-17 RX ADMIN — TIMOLOL MALEATE 1 DROP: 5 SOLUTION OPHTHALMIC at 08:03

## 2017-03-17 RX ADMIN — Medication 3 ML: at 08:03

## 2017-03-17 RX ADMIN — ESCITALOPRAM 20 MG: 20 TABLET, FILM COATED ORAL at 02:03

## 2017-03-17 RX ADMIN — METOPROLOL SUCCINATE 25 MG: 25 TABLET, EXTENDED RELEASE ORAL at 02:03

## 2017-03-17 RX ADMIN — POTASSIUM CHLORIDE 10 MEQ: 10 INJECTION, SOLUTION INTRAVENOUS at 05:03

## 2017-03-17 RX ADMIN — Medication 3 ML: at 02:03

## 2017-03-17 RX ADMIN — POTASSIUM BICARBONATE 50 MEQ: 25 TABLET, EFFERVESCENT ORAL at 04:03

## 2017-03-17 RX ADMIN — Medication 5 UNITS: at 04:03

## 2017-03-17 RX ADMIN — POTASSIUM CHLORIDE 10 MEQ: 10 INJECTION, SOLUTION INTRAVENOUS at 01:03

## 2017-03-17 RX ADMIN — ACETAMINOPHEN 650 MG: 325 TABLET ORAL at 03:03

## 2017-03-17 RX ADMIN — Medication 3 ML: at 05:03

## 2017-03-17 RX ADMIN — LATANOPROST 1 DROP: 50 SOLUTION/ DROPS OPHTHALMIC at 08:03

## 2017-03-17 RX ADMIN — POTASSIUM CHLORIDE 10 MEQ: 10 INJECTION, SOLUTION INTRAVENOUS at 03:03

## 2017-03-17 RX ADMIN — ACETAMINOPHEN 650 MG: 325 TABLET ORAL at 02:03

## 2017-03-17 RX ADMIN — QUETIAPINE FUMARATE 25 MG: 25 TABLET, FILM COATED ORAL at 08:03

## 2017-03-17 NOTE — PLAN OF CARE
CM in to see pt this am with spouse and son at bedside.  Pt awake and alert conversant with bruising noted to L side forehead secondary to fall.  PT recs SNF with son and spouse in agreement.  SW informed of family wishes to go to SNF in [place] area due to she doesn't drive to much, SW to bring list of choices to family.  CM will continue to follow.

## 2017-03-17 NOTE — PT/OT/SLP PROGRESS
Physical Therapy      Migue Lewis Fraser  MRN: [id number]    Patient not seen today secondary to RN Hold 2° pt waiting to go to CT scan for neuro change. PT unable to re-attempt this date. Will follow-up at next scheduled visit.    Katherin Patterson, PT   03/17/2017

## 2017-03-17 NOTE — PROGRESS NOTES
[facility] Hospital Medicine  Progress Note    Patient Name: Migue Fraser  MRN: [id number]  Patient Class: IP- Inpatient   Admission Date: 3/10/2017  Length of Stay: 6 days  Attending Physician: Michael Monterroso MD  Primary Care Provider: Iggy Telles MD    Hospital Medicine Team: Networked reference to record PCT  Shane Bruce MD    Subjective:     Principal Problem:Traumatic subarachnoid hematoma with loss of consciousness    HPI:  89 yo M w pmhx/o SSS and A-fib (on Elaquis), prostate cancer, and dementia who was experienced an unwitnessed fall at home earlier today. Pt was found down in his back yard by his son. CT scan demonstrated multiple parenchymal hemorrhages with superimposed SAH. Pt has dementia at baseline but according to son is markedly more confused than usual.     Hospital Course:  On admit, patient was administered PCC; and neurosurgery was consulted. Repeat CT scans showed stable small intracranial sub arachnoid hemorrhages. Hospital course remarkable for agitation; requiring intermittent precedex ggt which was discontinued and transitioned to seroquel BID with PRN haldol. Patient otherwise neurologically stable and at patient's cognitive baseline. Home med metoprolol restarted for a fib. Stepped down to the floor in 3/17/2017 and was seen in the morning and was sleepy CT scan ruled out any further progression, and desating overnight, and started on 2 L NC, CXR was normal, seen by neurosurgery and said his mental status was waxing and waning during his stay, seen by SLP and cleared him from swallowing perspective and avoid feeding him while he is sleepy.     Interval History: patient seen today, was sleeping and his sleep cycle is reversed, hard to arouse, but oriented to self, person and place, following command intermittently, then doze off to sleep, seen by SLP and passed swallowing test, per family he isn't complaining of pain.     Review of Systems    Unable to perform ROS: Dementia   Respiratory: Negative for shortness of breath.    Cardiovascular: Negative for chest pain.   Gastrointestinal: Negative for abdominal pain.     Objective:     Vital Signs (Most Recent):  Temp: 97.9 °F (36.6 °C) (03/17/17 1200)  Pulse: 82 (03/17/17 1500)  Resp: 16 (03/17/17 1200)  BP: 116/63 (03/17/17 1200)  SpO2: 96 % (03/17/17 1200) Vital Signs (24h Range):  Temp:  [97.6 °F (36.4 °C)-98.6 °F (37 °C)] 97.9 °F (36.6 °C)  Pulse:  [71-90] 82  Resp:  [16-18] 16  SpO2:  [87 %-97 %] 96 %  BP: (102-169)/(63-92) 116/63     Weight: 76 kg (167 lb 8.8 oz)  Body mass index is 24.74 kg/(m^2).    Intake/Output Summary (Last 24 hours) at 03/17/17 1629  Last data filed at 03/17/17 1500   Gross per 24 hour   Intake              400 ml   Output              675 ml   Net             -275 ml      Physical Exam   Constitutional: No distress.   Eyes: No scleral icterus.   Neck: No JVD present.   Cardiovascular: Normal rate and regular rhythm.    No murmur heard.  Pulmonary/Chest: Effort normal and breath sounds normal. No respiratory distress. He has no wheezes.   Abdominal: Soft. He exhibits no distension. There is no tenderness.   Musculoskeletal: He exhibits no edema or tenderness.   Neurological:   Hard to arouse, not fully oriented.    Skin: Skin is warm. He is not diaphoretic.       Significant Labs:   CBC:   Recent Labs  Lab 03/16/17  0311 03/17/17  0413   WBC 8.71 6.87   HGB 13.7* 12.8*   HCT 38.9* 37.4*    166     CMP:   Recent Labs  Lab 03/16/17  0311 03/17/17  0413    139   K 3.4* 3.2*    101   CO2 28 28   * 131*   BUN 13 19   CREATININE 0.8 0.8   CALCIUM 9.3 9.1   PROT 6.9 6.0   ALBUMIN 3.4* 2.9*   BILITOT 1.6* 1.1*   ALKPHOS 73 65   AST 17 12   ALT 10 9*   ANIONGAP 11 10   EGFRNONAA >60.0 >60.0       Significant Imaging: CXR is normal, CT head no progression of his hemorrhage.    Assessment/Plan:      Atrial fibrillation  -- was on eliquis at home and was held  during admission due to subarachnoid hemorrhage and given kecentra.  -- currently his HR is normal with normal blood pressure. On toprol-xl 25 mg daily.       Agitation requiring sedation protocol  -- needed sedation in the ICU  -- currently not agitated.  -- on Seroquel regular and haldol prn       SAH (subarachnoid hemorrhage)  -- seen by neurosurgery and no intervention was done.  -- repeated CT scan was stable.         Hypokalemia  -- replaced with IV potassium 50 mEq today.       Debility  -- PT/OT recommend SNF.       VTE Risk Mitigation     None          Shane Bruce MD  Department of Hospital Medicine   [facility]

## 2017-03-17 NOTE — ASSESSMENT & PLAN NOTE
-- needed sedation in the ICU  -- currently not agitated.  -- on Seroquel regular and haldol prn

## 2017-03-17 NOTE — PT/OT/SLP EVAL
Speech Language Pathology  Evaluation    Migue Fraser   MRN: [id number]   Admitting Diagnosis: Traumatic subarachnoid hematoma with loss of consciousness    Diet recommendations: Solid Diet Level: Regular  Liquid Diet Level: Clears with Ensure Feed only when awake/alert, HOB to 90 degrees, 1 bite/sip at a time, Meds crushed in puree, Eliminate distractions and Assistance with meals    SLP Treatment Date: 17  Speech Start Time: 1215     Speech Stop Time: 1232     Speech Total (min): 17 min       TREATMENT BILLABLE MINUTES:  Eval Swallow and Oral Function 17    Diagnosis: Traumatic subarachnoid hematoma with loss of consciousness      Past Medical History:   Diagnosis Date    A-fib     Atrial fibrillation     Blastomycosis     Cancer     prostate CA    Glaucoma     Sick sinus syndrome      Past Surgical History:   Procedure Laterality Date    APPENDECTOMY      CHOLECYSTECTOMY      HERNIA REPAIR      JOINT REPLACEMENT         Has the patient been evaluated by SLP for swallowing? : Yes  Keep patient NPO?: No   General Precautions: Standard, aspiration, fall    Current Respiratory Status: nasal cannula     Prior diet: reg/thin.    Subjective:  SLP re-consulted 2/2 decreased alertness with difficulty swallowing pills this am; pt lethargic t/o study    Patient goals: pt did not state.    Pain Ratin/10              Pain Rating Post-Intervention: 0/10    Objective:   Patient found with: oxygen    Oral Musculature Evaluation  Oral Musculature: general weakness  Dentition: present and adequate  Mucosal Quality: good  Lingual Strength and Mobility: functional protrusion, functional lateral movement  Volitional Cough: not elicited  Volitional Swallow: not elicited  Voice Prior to PO Intake: clear, decreased intensity     Bedside Swallow Eval:  Consistencies Assessed: thin liquids via spoon, cup and straw, puree via 1/2 tspn x4  Oral Phase: anterior loss, excess chewing and slow oral transit  time  Pharyngeal Phase: no overt clinical  signs/symptoms of aspiration, delayed swallow initiation  Pt with decreased labial closure on cup rim with thin liquids trials.  Improved labial closure with timely initiation of swallow given thin liquids via straw.  Pt observed with cyclical sips from straw with no overt s/s aspiration.  No overt s/s aspiration observed with puree though increased a-p transit times with verbal cues and dry spoon required to initiate pharyngeal swallow. Pt did open eyes initially though decreasing alertness with progression of trials.     Additional Treatment:  Education on role of SLP, POC, diet change to liquids/Ensure ONLY when alert, overt s/s aspiration, safe intake management and swallow precs provided.  Pt's family verbalized understanding and agreement.     Assessment:  Migue Fraser is a 89 y.o. male with a medical diagnosis of Traumatic subarachnoid hematoma with loss of consciousness and presents with decreased alertness with increased aspiration risk/dysphagia.    Do you have any cultural, spiritual, Adventist conflicts, given your current situation?: none     Discharge recommendations: Discharge Facility/Level Of Care Needs: nursing facility, skilled     Goals:   SLP Goals        Problem: SLP Goal    Goal Priority Disciplines Outcome   SLP Goal     SLP Ongoing (interventions implemented as appropriate)   Description:  Speech Language Pathology Goals  Goals expected to be met by 3/18  1.  Pt will tolerate thin liquids without overt s/s aspiration.  2. Pt will tolerate trials of puree without overt s/s aspiration.  3. Pt will tolerate trials of solids without overt s/s aspiration.                     Plan:   Patient to be seen Therapy Frequency: 5 x/week   Plan of Care expires: 04/16/17  Plan of Care reviewed with: patient, family  SLP Follow-up?: Yes  SLP - Next Visit Date: 03/13/17           AVRIL Sanchez, CCC-SLP  03/17/2017

## 2017-03-17 NOTE — PLAN OF CARE
Problem: Pressure Ulcer Risk (Dm Scale) (Adult,Obstetrics,Pediatric)  Goal: Skin Integrity  Patient will demonstrate the desired outcomes by discharge/transition of care.   Outcome: Ongoing (interventions implemented as appropriate)  Turn patient q 2 hours. Keep dry and linens wrinkle-free.

## 2017-03-17 NOTE — PLAN OF CARE
ISABELLA met with pt, pt's wife, and pt's son at bedside to discuss d/c planning. Pt's wife says she is now open to skilled nursing placement but only at [facility] or [facility]. SW explained [facility] only accepts  patients. SW provided [facility] SNF list which is limited to 3 local SNFs: [place], [place], and [facility]. Pt's wife is apalled at the list and says she lives in [place] and can barely get to [facility] to see pt. Pt's wife is adamant she cannot send pt to any of the facilities on the list and even attempts to give SW the list back. SW's contact info on sheet, pt's wife encouraged to keep list. SW informed pt's wife we could try [facility] and go from there. Referral sent.    Jessica Buenrostro LMSW, [facility]  [phone]

## 2017-03-17 NOTE — PLAN OF CARE
Problem: Patient Care Overview  Goal: Plan of Care Review  Outcome: Ongoing (interventions implemented as appropriate)  Patient has remained free of falls this shift. He has had is eyes open spontaneously, but does not like to follow commands.  His VS's have been stable. His daughter is at the bedside and she would like to take him home. She says that it would be easier on both of him since she gives him his medications already.

## 2017-03-17 NOTE — PROGRESS NOTES
Unable to give morning medication due to patient's lethargy and inability to swallow. Dr. Lang at bedside and notified. Order to hold medications. No other new orders at this time.

## 2017-03-17 NOTE — PHYSICIAN QUERY
PT Name: Migue Fraser  MR#: [id number]    Physician Query Form - Documentation Clarification    Reviewer  [email or web address]      This form is a permanent document in the medical record.     Query Date: 03/17/2017  By submitting this query, we are merely seeking further clarification of documentation to reflect the severity of illness of your patient. Please utilize your independent clinical judgment when addressing the question(s) below.    (The Medical record reflects the following:)      Supporting Clinical Findings Location in Medical Record     Slight interval enlargement of the left-sided extra-axial fluid collection with stable local mass effect and minimal left-to-right midline shift.      Interval decrease in size of the predominantly low attenuation subdural collection overlying the left cerebral hemisphere, now measuring 3 mm. There is minimal associated mass effect on the subjacent parenchyma and minimal rightward midline shift.    CT 3/15          CT 3/17                                                                            Doctor, Please specify diagnosis or diagnoses associated with above clinical findings.    Physician Use Only      [     ] Cerebral Edema   [     ] Other:  [   X  ] Unable to determine

## 2017-03-17 NOTE — PLAN OF CARE
Problem: SLP Goal  Goal: SLP Goal  Speech Language Pathology Goals  Goals expected to be met by 3/18  1. Pt will tolerate thin liquids without overt s/s aspiration.  2. Pt will tolerate trials of puree without overt s/s aspiration.  3. Pt will tolerate trials of solids without overt s/s aspiration.     Outcome: Ongoing (interventions implemented as appropriate)  Bedside swallow study completed.  Pt lethargic t/o assessment with verbal cues required to maintain alertness.  Pt with fairly timely initiation of swallow given thin liquids via straw with no overt s/s aspiration.  Swallow response was considerably delayed with puree trials with increased aspiration risk.  Rec downgrade diet to clear liquids with Ensure and necessary meds crushed and buried in puree only if pt is alert and upright.  SLP to continue to follow. AVRIL Sanchez, MARK/SLP  3/17/2017

## 2017-03-17 NOTE — PROGRESS NOTES
Pt lethargic, only oriented to self. Jayla from NS at bedside and aware. No new orders at this time. Will continue to monitor patient.

## 2017-03-17 NOTE — ASSESSMENT & PLAN NOTE
-- was on eliquis at home and was held during admission due to subarachnoid hemorrhage and given kecentra.  -- currently his HR is normal with normal blood pressure. On toprol-xl 25 mg daily.

## 2017-03-17 NOTE — PLAN OF CARE
Problem: Pressure Ulcer Risk (Dm Scale) (Adult,Obstetrics,Pediatric)  Goal: Identify Related Risk Factors and Signs and Symptoms  Related risk factors and signs and symptoms are identified upon initiation of Human Response Clinical Practice Guideline (CPG)   Outcome: Ongoing (interventions implemented as appropriate)  Encouraged Boost dietary supplements; encouraged fluids; family at bedside attempting to reorient during times of confusion.

## 2017-03-17 NOTE — SUBJECTIVE & OBJECTIVE
Interval History: patient seen today, was sleeping and his sleep cycle is reversed, hard to arouse, but oriented to self, person and place, following command intermittently, then doze off to sleep, seen by SLP and passed swallowing test, per family he isn't complaining of pain.     Review of Systems   Unable to perform ROS: Dementia   Respiratory: Negative for shortness of breath.    Cardiovascular: Negative for chest pain.   Gastrointestinal: Negative for abdominal pain.     Objective:     Vital Signs (Most Recent):  Temp: 97.9 °F (36.6 °C) (03/17/17 1200)  Pulse: 82 (03/17/17 1500)  Resp: 16 (03/17/17 1200)  BP: 116/63 (03/17/17 1200)  SpO2: 96 % (03/17/17 1200) Vital Signs (24h Range):  Temp:  [97.6 °F (36.4 °C)-98.6 °F (37 °C)] 97.9 °F (36.6 °C)  Pulse:  [71-90] 82  Resp:  [16-18] 16  SpO2:  [87 %-97 %] 96 %  BP: (102-169)/(63-92) 116/63     Weight: 76 kg (167 lb 8.8 oz)  Body mass index is 24.74 kg/(m^2).    Intake/Output Summary (Last 24 hours) at 03/17/17 1629  Last data filed at 03/17/17 1500   Gross per 24 hour   Intake              400 ml   Output              675 ml   Net             -275 ml      Physical Exam   Constitutional: No distress.   Eyes: No scleral icterus.   Neck: No JVD present.   Cardiovascular: Normal rate and regular rhythm.    No murmur heard.  Pulmonary/Chest: Effort normal and breath sounds normal. No respiratory distress. He has no wheezes.   Abdominal: Soft. He exhibits no distension. There is no tenderness.   Musculoskeletal: He exhibits no edema or tenderness.   Neurological:   Hard to arouse, not fully oriented.    Skin: Skin is warm. He is not diaphoretic.       Significant Labs:   CBC:   Recent Labs  Lab 03/16/17 0311 03/17/17 0413   WBC 8.71 6.87   HGB 13.7* 12.8*   HCT 38.9* 37.4*    166     CMP:   Recent Labs  Lab 03/16/17 0311 03/17/17  0413    139   K 3.4* 3.2*    101   CO2 28 28   * 131*   BUN 13 19   CREATININE 0.8 0.8   CALCIUM 9.3 9.1   PROT  6.9 6.0   ALBUMIN 3.4* 2.9*   BILITOT 1.6* 1.1*   ALKPHOS 73 65   AST 17 12   ALT 10 9*   ANIONGAP 11 10   EGFRNONAA >60.0 >60.0       Significant Imaging: CXR is normal, CT head no progression of his hemorrhage.

## 2017-03-17 NOTE — PROGRESS NOTES
Progress Note  Neurosurgery    Admit Date: 3/10/2017  Post-operative Day:    Hospital Day: 8    SUBJECTIVE:     Migue Fraser is a 89 y.o. male s/p fall on eliquis for afib with bilateral tSAH. Pt stepped down to the floor overnight. Patients wife reports he did well overnight, agitation improved. He is slightly more somnolent this morning than yesterday but wife reports he has been having some waxing and waning mental status in the ICU. Pt demented at baseline. He denies complaints, opens eyes to stimulation and following commands intermittently.     Follow-up For:  * No surgery found *    Scheduled Meds:   enoxaparin  40 mg Subcutaneous Daily    escitalopram oxalate  20 mg Oral Daily    haloperidol lactate  2 mg Intravenous Once    latanoprost  1 drop Both Eyes QHS    metoprolol succinate  25 mg Oral Daily    quetiapine  25 mg Oral QHS    sodium chloride 0.9%  3 mL Intravenous Q8H    timolol maleate 0.5%  1 drop Both Eyes BID     Continuous Infusions:     PRN Meds:acetaminophen, haloperidol lactate, ondansetron, ramelteon    Review of patient's allergies indicates:   Allergen Reactions    Penicillins        OBJECTIVE:     Vital Signs (Most Recent)  Temp: 98.6 °F (37 °C) (03/17/17 0333)  Pulse: 87 (03/17/17 0500)  Resp: 18 (03/17/17 0333)  BP: (!) 169/92 (03/17/17 0333)  SpO2: 96 % (03/16/17 1737)    Vital Signs Range (Last 24H):  Temp:  [97.6 °F (36.4 °C)-98.6 °F (37 °C)]   Pulse:  [76-91]   Resp:  [18-23]   BP: ()/(51-94)   SpO2:  [93 %-100 %]     I & O (Last 24H):    Intake/Output Summary (Last 24 hours) at 03/17/17 0611  Last data filed at 03/17/17 0500   Gross per 24 hour   Intake              650 ml   Output              175 ml   Net              475 ml     Physical Exam:  Cachectic appearing, well developed in NAD  E2V4M6, somnolent but arousable with stimulation   Oriented to self and place  Intermittently following commands  PERRL  ALIE without focal weakness, generalized  deconditioning  Responds to noxious stimuli throughout     Lines/Drains:       Peripheral IV - Single Lumen 03/11/17 Right Antecubital (Active)   Site Assessment Clean;Dry;Intact;No redness;No swelling 3/12/2017  3:00 AM   Line Status Blood return noted;Flushed 3/12/2017  3:00 AM   Dressing Status Clean;Dry;Intact 3/12/2017  3:00 AM   Dressing Intervention Dressing reinforced 3/12/2017  3:00 AM   Dressing Change Due 03/14/17 3/12/2017  3:00 AM   Site Change Due 03/14/17 3/12/2017  3:00 AM   Reason Not Rotated Not due 3/12/2017  3:00 AM   Number of days:1            Peripheral IV - Single Lumen 03/11/17 0800 Left Forearm (Active)   Site Assessment Clean;Dry;Intact;No redness;No swelling 3/12/2017  3:00 AM   Line Status Blood return noted;Flushed;Saline locked 3/12/2017  3:00 AM   Dressing Status Clean;Dry;Intact 3/12/2017  3:00 AM   Dressing Intervention New dressing 3/12/2017  3:00 AM   Dressing Change Due 03/15/17 3/12/2017  3:00 AM   Site Change Due 03/15/17 3/12/2017  3:00 AM   Reason Not Rotated Not due 3/12/2017  3:00 AM   Number of days:0            External Urinary Catheter 03/11/17 1100 Small (Active)   Collection Container Urimeter 3/12/2017  3:00 AM   Securement Method secured to top of thigh w/ tape 3/12/2017  3:00 AM   Skin no redness;no breakdown 3/12/2017  3:00 AM   Tolerance no signs/symptoms of discomfort 3/12/2017  3:00 AM   Output (mL) 100 mL 3/12/2017  6:00 AM   Number of days:0       Wound/Incision:  na    Laboratory:  CBC:     Recent Labs  Lab 03/17/17 0413   WBC 6.87   RBC 3.61*   HGB 12.8*   HCT 37.4*      *   MCH 35.5*   MCHC 34.2     CMP:     Recent Labs  Lab 03/17/17 0413   *   CALCIUM 9.1   ALBUMIN 2.9*   PROT 6.0      K 3.2*   CO2 28      BUN 19   CREATININE 0.8   ALKPHOS 65   ALT 9*   AST 12   BILITOT 1.1*     Coagulation:     Recent Labs  Lab 03/17/17  0413   INR 1.1     Cardiac markers:     Recent Labs  Lab 03/11/17  0008   TROPONINI 0.013        Recent Labs  Lab 03/13/17  1537   COLORU Yellow   SPECGRAV 1.015   PHUR 6.0   PROTEINUA Negative   BACTERIA Occasional   NITRITE Negative   LEUKOCYTESUR Negative   UROBILINOGEN Negative         Diagnostic Results:  CT head 3/15: stable from prior.    ASSESSMENT/PLAN:     Migue Fraser is a 89 M s/p fall on eliquis for afib with bilateral tSAH and left extra-axial collection.    -Pt neuro stable with stable imaging   -Q4h neuro checks   -Agitation/delerium: Rec day/night orientation.  Continue seroquel and escitalopram.  -SBP less than 150  -FEN/GI: ST following; Goal eunatremia;  -Hypokalemia, replete per primary team  -SCDs/TEDs for DVT prophylaxis. Ok to start SQH for DVT prophylaxis   -Needs aggressive PT/OT; OOB for >6hrs per day  -Ok to restart Eliquis 2 weeks from 2/13  -DISPO: SNF pending although family would like to take the patient home with home health  -Follow-up in clinic in 2 weeks with repeat CTH. Appointment scheduled and will be mailed to the patient.       Joan Suarez PA-C  Neurosurgery  [phone]

## 2017-03-18 PROBLEM — E83.39 HYPOPHOSPHATEMIA: Status: ACTIVE | Noted: 2017-03-18

## 2017-03-18 PROBLEM — E87.6 HYPOKALEMIA: Status: RESOLVED | Noted: 2017-03-17 | Resolved: 2017-03-18

## 2017-03-18 LAB
ALBUMIN SERPL BCP-MCNC: 2.9 G/DL
ALP SERPL-CCNC: 67 U/L
ALT SERPL W/O P-5'-P-CCNC: 10 U/L
ANION GAP SERPL CALC-SCNC: 8 MMOL/L
AST SERPL-CCNC: 11 U/L
BASOPHILS # BLD AUTO: 0.02 K/UL
BASOPHILS NFR BLD: 0.3 %
BILIRUB SERPL-MCNC: 0.8 MG/DL
BUN SERPL-MCNC: 21 MG/DL
CALCIUM SERPL-MCNC: 9.3 MG/DL
CHLORIDE SERPL-SCNC: 101 MMOL/L
CO2 SERPL-SCNC: 30 MMOL/L
CREAT SERPL-MCNC: 0.8 MG/DL
DIFFERENTIAL METHOD: ABNORMAL
EOSINOPHIL # BLD AUTO: 0.1 K/UL
EOSINOPHIL NFR BLD: 0.7 %
ERYTHROCYTE [DISTWIDTH] IN BLOOD BY AUTOMATED COUNT: 13.3 %
EST. GFR  (AFRICAN AMERICAN): >60 ML/MIN/1.73 M^2
EST. GFR  (NON AFRICAN AMERICAN): >60 ML/MIN/1.73 M^2
GLUCOSE SERPL-MCNC: 118 MG/DL
HCT VFR BLD AUTO: 37.4 %
HGB BLD-MCNC: 12.4 G/DL
INR PPP: 1.1
LYMPHOCYTES # BLD AUTO: 1.1 K/UL
LYMPHOCYTES NFR BLD: 15.3 %
MAGNESIUM SERPL-MCNC: 1.8 MG/DL
MCH RBC QN AUTO: 34.7 PG
MCHC RBC AUTO-ENTMCNC: 33.2 %
MCV RBC AUTO: 105 FL
MONOCYTES # BLD AUTO: 0.8 K/UL
MONOCYTES NFR BLD: 10.7 %
NEUTROPHILS # BLD AUTO: 5.2 K/UL
NEUTROPHILS NFR BLD: 72.7 %
PHOSPHATE SERPL-MCNC: 2.4 MG/DL
PLATELET # BLD AUTO: 175 K/UL
PMV BLD AUTO: 10.4 FL
POCT GLUCOSE: 124 MG/DL (ref 70–110)
POCT GLUCOSE: 125 MG/DL (ref 70–110)
POTASSIUM SERPL-SCNC: 3.8 MMOL/L
PROT SERPL-MCNC: 6 G/DL
PROTHROMBIN TIME: 11.2 SEC
RBC # BLD AUTO: 3.57 M/UL
SODIUM SERPL-SCNC: 139 MMOL/L
WBC # BLD AUTO: 7.08 K/UL

## 2017-03-18 PROCEDURE — 25000003 PHARM REV CODE 250: Performed by: STUDENT IN AN ORGANIZED HEALTH CARE EDUCATION/TRAINING PROGRAM

## 2017-03-18 PROCEDURE — 80053 COMPREHEN METABOLIC PANEL: CPT

## 2017-03-18 PROCEDURE — 25000003 PHARM REV CODE 250: Performed by: PSYCHIATRY & NEUROLOGY

## 2017-03-18 PROCEDURE — 99232 SBSQ HOSP IP/OBS MODERATE 35: CPT | Mod: ,,, | Performed by: NEUROLOGICAL SURGERY

## 2017-03-18 PROCEDURE — 20600001 HC STEP DOWN PRIVATE ROOM

## 2017-03-18 PROCEDURE — 63600175 PHARM REV CODE 636 W HCPCS: Performed by: HOSPITALIST

## 2017-03-18 PROCEDURE — 97116 GAIT TRAINING THERAPY: CPT

## 2017-03-18 PROCEDURE — 36415 COLL VENOUS BLD VENIPUNCTURE: CPT

## 2017-03-18 PROCEDURE — 97530 THERAPEUTIC ACTIVITIES: CPT

## 2017-03-18 PROCEDURE — 85025 COMPLETE CBC W/AUTO DIFF WBC: CPT

## 2017-03-18 PROCEDURE — 99232 SBSQ HOSP IP/OBS MODERATE 35: CPT | Mod: ,,, | Performed by: INTERNAL MEDICINE

## 2017-03-18 PROCEDURE — 83735 ASSAY OF MAGNESIUM: CPT

## 2017-03-18 PROCEDURE — 97110 THERAPEUTIC EXERCISES: CPT

## 2017-03-18 PROCEDURE — 25000003 PHARM REV CODE 250: Performed by: ANESTHESIOLOGY

## 2017-03-18 PROCEDURE — 84100 ASSAY OF PHOSPHORUS: CPT

## 2017-03-18 PROCEDURE — 85610 PROTHROMBIN TIME: CPT

## 2017-03-18 RX ADMIN — HALOPERIDOL LACTATE 2 MG: 5 INJECTION, SOLUTION INTRAMUSCULAR at 12:03

## 2017-03-18 RX ADMIN — HALOPERIDOL LACTATE 2 MG: 5 INJECTION, SOLUTION INTRAMUSCULAR at 10:03

## 2017-03-18 RX ADMIN — TIMOLOL MALEATE 1 DROP: 5 SOLUTION OPHTHALMIC at 09:03

## 2017-03-18 RX ADMIN — RAMELTEON 8 MG: 8 TABLET, FILM COATED ORAL at 08:03

## 2017-03-18 RX ADMIN — LATANOPROST 1 DROP: 50 SOLUTION/ DROPS OPHTHALMIC at 08:03

## 2017-03-18 RX ADMIN — Medication 3 ML: at 06:03

## 2017-03-18 RX ADMIN — Medication 3 ML: at 08:03

## 2017-03-18 RX ADMIN — QUETIAPINE FUMARATE 25 MG: 25 TABLET, FILM COATED ORAL at 08:03

## 2017-03-18 RX ADMIN — SODIUM PHOSPHATE, MONOBASIC, MONOHYDRATE 15 MMOL: 276; 142 INJECTION, SOLUTION INTRAVENOUS at 09:03

## 2017-03-18 RX ADMIN — TIMOLOL MALEATE 1 DROP: 5 SOLUTION OPHTHALMIC at 08:03

## 2017-03-18 NOTE — PLAN OF CARE
Problem: Physical Therapy Goal  Goal: Physical Therapy Goal  PT goals for the next 10 visits    1. Pt supine to sit with minimal assist-not met  2. Pt sit to supine with CGA-not met  3. Pt sit to stand with RW with CGA-not met  4. Pt to perform gait 100ft with RW with minimal assist.-not met  5. Pt to transfer bed to/from bedside chair with RW with minimal assist.-MET 3/16  6. Pt to up/down 6 steps with B UE rails with minimal assist.-not met   7. Pt to perform B LE exs in sitting or supine x 20 reps with family assist.-not met   Outcome: Ongoing (interventions implemented as appropriate)  Functional mobility showed good improvement today, but depends on cognitive level on a given day.

## 2017-03-18 NOTE — PLAN OF CARE
Problem: Patient Care Overview  Goal: Plan of Care Review  Outcome: Ongoing (interventions implemented as appropriate)  Reviewed care plan with patient.  Uneventful shift. Patient free of falls. Vital signs stable see flow sheet. Ongoing with care plan for discharge. VSS, NAD.

## 2017-03-18 NOTE — CONSULTS
"Admission Medication Reconciliation - Pharmacy Consult Note    The home medication history was taken by Katherin Dnenis, Pharmacy Technician.  Based on information gathered and subsequent review by the clinical pharmacist, the items below may need attention.     You may go to "Admission" then "Reconcile Home Medications" tabs to review and/or act upon these items. Based on information gathered and subsequent review by the clinical pharmacist, the items below may need attention.    No discrepancies with current MAR were identified     Please address this information as you see fit.  Feel free to contact us if you have any questions or require assistance.    Sharla Main, PharmD, PGY-1 Pharmacy Resident   [phone]      "

## 2017-03-18 NOTE — PROGRESS NOTES
Progress Note  Neurosurgery    Admit Date: 3/10/2017  Post-operative Day:    Hospital Day: 9    SUBJECTIVE:     Migue Fraser is a 89 y.o. male s/p fall on eliquis for afib with bilateral tSAH.     NATAYLERON.    Follow-up For:  * No surgery found *    Scheduled Meds:   escitalopram oxalate  20 mg Oral Daily    latanoprost  1 drop Both Eyes QHS    metoprolol succinate  25 mg Oral Daily    quetiapine  25 mg Oral QHS    sodium chloride 0.9%  3 mL Intravenous Q8H    sodium phosphate IVPB  15 mmol Intravenous Once    timolol maleate 0.5%  1 drop Both Eyes BID     Continuous Infusions:     PRN Meds:acetaminophen, haloperidol lactate, ondansetron, ramelteon    Review of patient's allergies indicates:   Allergen Reactions    Penicillins        OBJECTIVE:     Vital Signs (Most Recent)  Temp: 99 °F (37.2 °C) (03/18/17 0415)  Pulse: 77 (03/18/17 0415)  Resp: 18 (03/18/17 0415)  BP: 132/66 (03/18/17 0415)  SpO2: 95 % (03/18/17 0415)    Vital Signs Range (Last 24H):  Temp:  [97.6 °F (36.4 °C)-99 °F (37.2 °C)]   Pulse:  []   Resp:  [16-18]   BP: (102-160)/(56-91)   SpO2:  [87 %-99 %]     I & O (Last 24H):    Intake/Output Summary (Last 24 hours) at 03/18/17 0755  Last data filed at 03/18/17 0400   Gross per 24 hour   Intake              500 ml   Output              950 ml   Net             -450 ml     Physical Exam:  Cachectic appearing, well developed in NAD  E2V4M6, somnolent but arousable with stimulation   Oriented to self and place  Intermittently following commands  PERRL  ALIE without focal weakness, generalized deconditioning  Responds to noxious stimuli throughout     Lines/Drains:       Peripheral IV - Single Lumen 03/11/17 Right Antecubital (Active)   Site Assessment Clean;Dry;Intact;No redness;No swelling 3/12/2017  3:00 AM   Line Status Blood return noted;Flushed 3/12/2017  3:00 AM   Dressing Status Clean;Dry;Intact 3/12/2017  3:00 AM   Dressing Intervention Dressing reinforced 3/12/2017  3:00 AM    Dressing Change Due 03/14/17 3/12/2017  3:00 AM   Site Change Due 03/14/17 3/12/2017  3:00 AM   Reason Not Rotated Not due 3/12/2017  3:00 AM   Number of days:1            Peripheral IV - Single Lumen 03/11/17 0800 Left Forearm (Active)   Site Assessment Clean;Dry;Intact;No redness;No swelling 3/12/2017  3:00 AM   Line Status Blood return noted;Flushed;Saline locked 3/12/2017  3:00 AM   Dressing Status Clean;Dry;Intact 3/12/2017  3:00 AM   Dressing Intervention New dressing 3/12/2017  3:00 AM   Dressing Change Due 03/15/17 3/12/2017  3:00 AM   Site Change Due 03/15/17 3/12/2017  3:00 AM   Reason Not Rotated Not due 3/12/2017  3:00 AM   Number of days:0            External Urinary Catheter 03/11/17 1100 Small (Active)   Collection Container Urimeter 3/12/2017  3:00 AM   Securement Method secured to top of thigh w/ tape 3/12/2017  3:00 AM   Skin no redness;no breakdown 3/12/2017  3:00 AM   Tolerance no signs/symptoms of discomfort 3/12/2017  3:00 AM   Output (mL) 100 mL 3/12/2017  6:00 AM   Number of days:0       Wound/Incision:  na    Laboratory:  CBC:     Recent Labs  Lab 03/18/17  0345   WBC 7.08   RBC 3.57*   HGB 12.4*   HCT 37.4*      *   MCH 34.7*   MCHC 33.2     CMP:     Recent Labs  Lab 03/18/17  0345   *   CALCIUM 9.3   ALBUMIN 2.9*   PROT 6.0      K 3.8   CO2 30*      BUN 21   CREATININE 0.8   ALKPHOS 67   ALT 10   AST 11   BILITOT 0.8     Coagulation:     Recent Labs  Lab 03/18/17  0345   INR 1.1     Cardiac markers:   No results for input(s): CKMB, CPKMB, TROPONINT, TROPONINI, MYOGLOBIN in the last 168 hours.    Recent Labs  Lab 03/13/17  1537   COLORU Yellow   SPECGRAV 1.015   PHUR 6.0   PROTEINUA Negative   BACTERIA Occasional   NITRITE Negative   LEUKOCYTESUR Negative   UROBILINOGEN Negative         Diagnostic Results:  CT head: stable from prior.    ASSESSMENT/PLAN:     Migue Saucedo Evelio is a 89 M s/p fall on eliquis for afib with bilateral tSAH and left  extra-axial collection.    -Pt neuro stable with stable imaging   -Q4h neuro checks   -Agitation/delerium: Rec day/night orientation.  Continue seroquel and escitalopram.  -SBP less than 150  -FEN/GI: ST following; Goal eunatremia;  -Hypokalemia, replete per primary team  -SCDs/TEDs for DVT prophylaxis. Ok to start SQH for DVT prophylaxis   -Needs aggressive PT/OT; OOB for >6hrs per day  -Ok to restart Eliquis 2 weeks from 2/13  -DISPO:  Pending SNF.  -Follow-up in clinic in 2 weeks with repeat CTH. Appointment scheduled and will be mailed to the patient.   -Will sign off.

## 2017-03-18 NOTE — SUBJECTIVE & OBJECTIVE
Interval History: patient seen today, more alert, no active issues overnight, not in pain.    Review of Systems   Unable to perform ROS: Dementia   Respiratory: Negative for shortness of breath.    Cardiovascular: Negative for chest pain.   Gastrointestinal: Negative for abdominal pain.     Objective:     Vital Signs (Most Recent):  Temp: 98.7 °F (37.1 °C) (03/18/17 1227)  Pulse: 75 (03/18/17 1300)  Resp: 12 (03/18/17 1227)  BP: 120/71 (03/18/17 1227)  SpO2: 97 % (03/18/17 1227) Vital Signs (24h Range):  Temp:  [97.7 °F (36.5 °C)-99.1 °F (37.3 °C)] 98.7 °F (37.1 °C)  Pulse:  [] 75  Resp:  [12-18] 12  SpO2:  [95 %-99 %] 97 %  BP: (103-160)/(56-91) 120/71     Weight: 76 kg (167 lb 8.8 oz)  Body mass index is 24.74 kg/(m^2).    Intake/Output Summary (Last 24 hours) at 03/18/17 1440  Last data filed at 03/18/17 1200   Gross per 24 hour   Intake              950 ml   Output             1055 ml   Net             -105 ml      Physical Exam   Constitutional: No distress.   HENT:   Bruises on left side of the forehead.    Eyes: No scleral icterus.   Neck: No JVD present.   Cardiovascular: Normal rate and regular rhythm.    No murmur heard.  Pulmonary/Chest: Effort normal and breath sounds normal. No respiratory distress. He has no wheezes.   Abdominal: Soft. He exhibits no distension. There is no tenderness.   Musculoskeletal: He exhibits no edema or tenderness.   Neurological: He is alert.   Oriented to self and person.    Skin: Skin is warm. He is not diaphoretic.       Significant Labs:   CBC:   Recent Labs  Lab 03/17/17 0413 03/18/17  0345   WBC 6.87 7.08   HGB 12.8* 12.4*   HCT 37.4* 37.4*    175     CMP:   Recent Labs  Lab 03/17/17 0413 03/18/17  0345    139   K 3.2* 3.8    101   CO2 28 30*   * 118*   BUN 19 21   CREATININE 0.8 0.8   CALCIUM 9.1 9.3   PROT 6.0 6.0   ALBUMIN 2.9* 2.9*   BILITOT 1.1* 0.8   ALKPHOS 65 67   AST 12 11   ALT 9* 10   ANIONGAP 10 8   EGFRNONAA >60.0 >60.0

## 2017-03-18 NOTE — PT/OT/SLP PROGRESS
"Physical Therapy  Treatment    Migue Fraser   MRN: [id number]   Admitting Diagnosis: Traumatic subarachnoid hematoma with loss of consciousness    PT Received On: 03/18/17  PT Start Time: 1104     PT Stop Time: 1143    PT Total Time (min): 39 min       Billable Minutes:  Gait Training 15, Therapeutic Activity 15 and Therapeutic Exercise 9    Treatment Type: Treatment  PT/PTA: PTA     PTA Visit Number: 1       General Precautions: Standard, aspiration, fall  Orthopedic Precautions: N/A   Braces:      Do you have any cultural, spiritual, Christianity conflicts, given your current situation?: none noted    Subjective:  Communicated with NSG prior to session.  Patient states " I was so sleepy before. My neck feels very sore when I'm sitting"    Pain Rating: other (see comments) (Unable to rate pain, but reported while sitting at EOB only.)  Location - Side: Bilateral  Location - Orientation: generalized  Location: neck  Pain Addressed: Reposition, Distraction  Pain Rating Post-Intervention: 0/10    Objective:   Patient found with: telemetry, peripheral IV    Functional Mobility:  Bed Mobility:   Rolling/Turning Right: Minimum assistance  Scooting/Bridging: Moderate Assistance  Supine to Sit: Moderate Assistance  Sit to Supine: Moderate Assistance    Transfers:  Sit <> Stand Assistance: Minimum Assistance  Sit <> Stand Assistive Device: Rolling Walker    Gait:   Gait Distance: 3 ft fwd/bwd x 4 trials with max cues to increase B step length.  Assistance 1: Moderate assistance, Minimum assistance  Gait Assistive Device: Rolling walker  Gait Pattern: 3-point gait  Gait Deviation(s): decreased ivral, increased time in double stance, decreased velocity of limb motion, decreased step length, decreased stride length, decreased toe-to-floor clearance, decreased weight-shifting ability, backward lean    Stairs:      Balance:   Static Sit: FAIR-: Maintains without assist but inconsistent   Dynamic Sit: FAIR: Cannot move trunk " without losing balance  Static Stand: POOR+: Needs MINIMAL assist to maintain  Dynamic stand: POOR: N/A     Therapeutic Activities and Exercises:  Static sitting balance at EOB x 10 minutes to prepare for treatment and to correct lateral lean to the right, requiring min to SBA. Static standing balance 3x45 secs using RW with min assist. With cues to correct posture and posterior lean. B LE AAROM/PROM x 20 reps on all available planes of motion. Elevated B heels on a pillow to prevent Decubitus.     AM-PAC 6 CLICK MOBILITY  How much help from another person does this patient currently need?   1 = Unable, Total/Dependent Assistance  2 = A lot, Maximum/Moderate Assistance  3 = A little, Minimum/Contact Guard/Supervision  4 = None, Modified [place]/Independent    Turning over in bed (including adjusting bedclothes, sheets and blankets)?: 3  Sitting down on and standing up from a chair with arms (e.g., wheelchair, bedside commode, etc.): 3  Moving from lying on back to sitting on the side of the bed?: 2  Moving to and from a bed to a chair (including a wheelchair)?: 3  Need to walk in hospital room?: 2  Climbing 3-5 steps with a railing?: 1  Total Score: 14    AM-PAC Raw Score CMS G-Code Modifier Level of Impairment Assistance   6 % Total / Unable   7 - 9 CM 80 - 100% Maximal Assist   10 - 14 CL 60 - 80% Moderate Assist   15 - 19 CK 40 - 60% Moderate Assist   20 - 22 CJ 20 - 40% Minimal Assist   23 CI 1-20% SBA / CGA   24 CH 0% Independent/ Mod I     Patient left HOB elevated with all lines intact, call button in reach, bed alarm on and Caregiver present.    Assessment:  Migue Fraser is a 89 y.o. male with a medical diagnosis of Traumatic subarachnoid hematoma with loss of consciousness and presents with decreased functional mobility. Patient initially appeared lethargic and drowsy, but with mobility patient's alertness and ability to follow motor commands improve. Patient showed posterior lean  while in standing, but at time was able to correct by himself. Significant flexed cervical spine noted with difficulty to correct by patient even with assistance. Patient would benefit from continued P.T. To address deficits.    Rehab identified problem list/impairments: Rehab identified problem list/impairments: weakness, impaired endurance, impaired self care skills, impaired functional mobilty, impaired balance, impaired cognition, decreased lower extremity function, decreased safety awareness, pain, decreased ROM, impaired fine motor    Rehab potential is fair.    Activity tolerance: Fair    Discharge recommendations: Discharge Facility/Level Of Care Needs: nursing facility, skilled     Barriers to discharge: Barriers to Discharge: Inaccessible home environment, Decreased caregiver support    Equipment recommendations: Equipment Needed After Discharge:  (TBD pending progress)     GOALS:   Physical Therapy Goals        Problem: Physical Therapy Goal    Goal Priority Disciplines Outcome Goal Variances Interventions   Physical Therapy Goal     PT/OT, PT Ongoing (interventions implemented as appropriate)     Description:  PT goals for the next 10 visits    1. Pt supine to sit with minimal assist-not met  2. Pt sit to supine with CGA-not met  3. Pt sit to stand with RW with CGA-not met  4. Pt to perform gait 100ft with RW with minimal assist.-not met  5. Pt to transfer bed to/from bedside chair with RW with minimal assist.-MET 3/16  6. Pt to up/down 6 steps with B UE rails with minimal assist.-not met   7. Pt to perform B LE exs in sitting or supine x 20 reps with family assist.-not met                 PLAN:    Patient to be seen 5 x/week  to address the above listed problems via gait training, therapeutic activities, therapeutic exercises, neuromuscular re-education  Plan of Care expires: 04/11/17  Plan of Care reviewed with: patient, caregiver         Thuan  Roberta, PTA  03/18/2017

## 2017-03-18 NOTE — PROGRESS NOTES
[facility] Hospital Medicine  Progress Note    Patient Name: Migue Fraser  MRN: [id number]  Patient Class: IP- Inpatient   Admission Date: 3/10/2017  Length of Stay: 7 days  Attending Physician: Michael Monterroso MD  Primary Care Provider: Iggy Telles MD    Hospital Medicine Team: [facility] 5 Shane Bruce MD    Subjective:     Principal Problem:Traumatic subarachnoid hematoma with loss of consciousness    HPI:  89 yo M w pmhx/o SSS and A-fib (on Elaquis), prostate cancer, and dementia who was experienced an unwitnessed fall at home earlier today. Pt was found down in his back yard by his son. CT scan demonstrated multiple parenchymal hemorrhages with superimposed SAH. Pt has dementia at baseline but according to son is markedly more confused than usual.     Hospital Course:  On admit, patient was administered PCC; and neurosurgery was consulted. Repeat CT scans showed stable small intracranial sub arachnoid hemorrhages. Hospital course remarkable for agitation; requiring intermittent precedex ggt which was discontinued and transitioned to seroquel BID with PRN haldol. Patient otherwise neurologically stable and at patient's cognitive baseline. Home med metoprolol restarted for a fib. Stepped down to the floor in 3/17/2017 and was seen in the morning and was sleepy CT scan ruled out any further progression, and desating overnight, and started on 2 L NC, CXR was normal, seen by neurosurgery and said his mental status was waxing and waning during his stay, seen by SLP and cleared him from swallowing perspective and avoid feeding him while he is sleepy. Next day he is more alert.     Interval History: patient seen today, more alert, no active issues overnight, not in pain.    Review of Systems   Unable to perform ROS: Dementia   Respiratory: Negative for shortness of breath.    Cardiovascular: Negative for chest pain.   Gastrointestinal: Negative for abdominal pain.      Objective:     Vital Signs (Most Recent):  Temp: 98.7 °F (37.1 °C) (03/18/17 1227)  Pulse: 75 (03/18/17 1300)  Resp: 12 (03/18/17 1227)  BP: 120/71 (03/18/17 1227)  SpO2: 97 % (03/18/17 1227) Vital Signs (24h Range):  Temp:  [97.7 °F (36.5 °C)-99.1 °F (37.3 °C)] 98.7 °F (37.1 °C)  Pulse:  [] 75  Resp:  [12-18] 12  SpO2:  [95 %-99 %] 97 %  BP: (103-160)/(56-91) 120/71     Weight: 76 kg (167 lb 8.8 oz)  Body mass index is 24.74 kg/(m^2).    Intake/Output Summary (Last 24 hours) at 03/18/17 1440  Last data filed at 03/18/17 1200   Gross per 24 hour   Intake              950 ml   Output             1055 ml   Net             -105 ml      Physical Exam   Constitutional: No distress.   HENT:   Bruises on left side of the forehead.    Eyes: No scleral icterus.   Neck: No JVD present.   Cardiovascular: Normal rate and regular rhythm.    No murmur heard.  Pulmonary/Chest: Effort normal and breath sounds normal. No respiratory distress. He has no wheezes.   Abdominal: Soft. He exhibits no distension. There is no tenderness.   Musculoskeletal: He exhibits no edema or tenderness.   Neurological: He is alert.   Oriented to self and person.    Skin: Skin is warm. He is not diaphoretic.       Significant Labs:   CBC:   Recent Labs  Lab 03/17/17  0413 03/18/17  0345   WBC 6.87 7.08   HGB 12.8* 12.4*   HCT 37.4* 37.4*    175     CMP:   Recent Labs  Lab 03/17/17  0413 03/18/17  0345    139   K 3.2* 3.8    101   CO2 28 30*   * 118*   BUN 19 21   CREATININE 0.8 0.8   CALCIUM 9.1 9.3   PROT 6.0 6.0   ALBUMIN 2.9* 2.9*   BILITOT 1.1* 0.8   ALKPHOS 65 67   AST 12 11   ALT 9* 10   ANIONGAP 10 8   EGFRNONAA >60.0 >60.0           Assessment/Plan:      Atrial fibrillation  -- was on eliquis at home and was held during admission due to subarachnoid hemorrhage and given kecentra.  -- currently his HR is normal with normal blood pressure. On toprol-xl 25 mg daily.   -- due to his risk of fall, avoiding  anticoagulation is advised.       Agitation requiring sedation protocol  -- needed sedation in the ICU  -- currently not agitated.  -- on Seroquel regular and haldol prn       SAH (subarachnoid hemorrhage)  -- seen by neurosurgery and no intervention was done.  -- repeated CT scan was stable.         Debility  -- PT/OT recommend SNF.       Hypophosphatemia  -- replace with Na phosphate 15 mm      Hypokalemia, resolved as of 3/18/2017  -- replaced with IV potassium 50 mEq yesterday.  -- resolved.        VTE Risk Mitigation     None          Shane Bruce MD  Department of Hospital Medicine   [facility]

## 2017-03-18 NOTE — ASSESSMENT & PLAN NOTE
-- was on eliquis at home and was held during admission due to subarachnoid hemorrhage and given kecentra.  -- currently his HR is normal with normal blood pressure. On toprol-xl 25 mg daily.   -- due to his risk of fall, avoiding anticoagulation is advised.

## 2017-03-18 NOTE — PLAN OF CARE
Problem: Patient Care Overview  Goal: Plan of Care Review  Outcome: Ongoing (interventions implemented as appropriate)  POC reviewed with pt and family at 1400. Pt unable to verbalize an understanding, pt's wife Rebekah verbalizes an understanding. Questions and concerns addressed. Pt remains AAO to self, 50 percent of meals consumed, skin remains intact, electrolytes replaced,pt remains more alert throughout shift No acute events today. Pt progressing toward goals. Will continue to monitor. See flowsheets for full assessment and VS info.

## 2017-03-19 PROBLEM — E83.39 HYPOPHOSPHATEMIA: Status: RESOLVED | Noted: 2017-03-18 | Resolved: 2017-03-19

## 2017-03-19 LAB
ALBUMIN SERPL BCP-MCNC: 2.9 G/DL
ALP SERPL-CCNC: 73 U/L
ALT SERPL W/O P-5'-P-CCNC: 9 U/L
ANION GAP SERPL CALC-SCNC: 8 MMOL/L
AST SERPL-CCNC: 14 U/L
BASOPHILS # BLD AUTO: 0.02 K/UL
BASOPHILS NFR BLD: 0.3 %
BILIRUB SERPL-MCNC: 1 MG/DL
BUN SERPL-MCNC: 19 MG/DL
CALCIUM SERPL-MCNC: 9.1 MG/DL
CHLORIDE SERPL-SCNC: 98 MMOL/L
CO2 SERPL-SCNC: 31 MMOL/L
CREAT SERPL-MCNC: 0.8 MG/DL
DIFFERENTIAL METHOD: ABNORMAL
EOSINOPHIL # BLD AUTO: 0.1 K/UL
EOSINOPHIL NFR BLD: 1.1 %
ERYTHROCYTE [DISTWIDTH] IN BLOOD BY AUTOMATED COUNT: 13.3 %
EST. GFR  (AFRICAN AMERICAN): >60 ML/MIN/1.73 M^2
EST. GFR  (NON AFRICAN AMERICAN): >60 ML/MIN/1.73 M^2
GLUCOSE SERPL-MCNC: 118 MG/DL
HCT VFR BLD AUTO: 37 %
HGB BLD-MCNC: 12.4 G/DL
LYMPHOCYTES # BLD AUTO: 1.5 K/UL
LYMPHOCYTES NFR BLD: 20.5 %
MAGNESIUM SERPL-MCNC: 1.8 MG/DL
MCH RBC QN AUTO: 34.7 PG
MCHC RBC AUTO-ENTMCNC: 33.5 %
MCV RBC AUTO: 104 FL
MONOCYTES # BLD AUTO: 1 K/UL
MONOCYTES NFR BLD: 14.1 %
NEUTROPHILS # BLD AUTO: 4.7 K/UL
NEUTROPHILS NFR BLD: 63.7 %
PHOSPHATE SERPL-MCNC: 3.7 MG/DL
PLATELET # BLD AUTO: 187 K/UL
PMV BLD AUTO: 10 FL
POCT GLUCOSE: 181 MG/DL (ref 70–110)
POCT GLUCOSE: 189 MG/DL (ref 70–110)
POTASSIUM SERPL-SCNC: 3.6 MMOL/L
PROT SERPL-MCNC: 6.3 G/DL
RBC # BLD AUTO: 3.57 M/UL
SODIUM SERPL-SCNC: 137 MMOL/L
WBC # BLD AUTO: 7.3 K/UL

## 2017-03-19 PROCEDURE — 20600001 HC STEP DOWN PRIVATE ROOM

## 2017-03-19 PROCEDURE — 25000003 PHARM REV CODE 250: Performed by: PSYCHIATRY & NEUROLOGY

## 2017-03-19 PROCEDURE — 99232 SBSQ HOSP IP/OBS MODERATE 35: CPT | Mod: ,,, | Performed by: INTERNAL MEDICINE

## 2017-03-19 PROCEDURE — 25000003 PHARM REV CODE 250: Performed by: ANESTHESIOLOGY

## 2017-03-19 PROCEDURE — 84100 ASSAY OF PHOSPHORUS: CPT

## 2017-03-19 PROCEDURE — 36415 COLL VENOUS BLD VENIPUNCTURE: CPT

## 2017-03-19 PROCEDURE — 85025 COMPLETE CBC W/AUTO DIFF WBC: CPT

## 2017-03-19 PROCEDURE — 63600175 PHARM REV CODE 636 W HCPCS: Performed by: STUDENT IN AN ORGANIZED HEALTH CARE EDUCATION/TRAINING PROGRAM

## 2017-03-19 PROCEDURE — 83735 ASSAY OF MAGNESIUM: CPT

## 2017-03-19 PROCEDURE — 25000003 PHARM REV CODE 250: Performed by: STUDENT IN AN ORGANIZED HEALTH CARE EDUCATION/TRAINING PROGRAM

## 2017-03-19 PROCEDURE — 80053 COMPREHEN METABOLIC PANEL: CPT

## 2017-03-19 PROCEDURE — 63600175 PHARM REV CODE 636 W HCPCS: Performed by: HOSPITALIST

## 2017-03-19 RX ORDER — ENOXAPARIN SODIUM 100 MG/ML
40 INJECTION SUBCUTANEOUS EVERY 24 HOURS
Status: DISCONTINUED | OUTPATIENT
Start: 2017-03-19 | End: 2017-03-22 | Stop reason: HOSPADM

## 2017-03-19 RX ORDER — METOPROLOL TARTRATE 1 MG/ML
5 INJECTION, SOLUTION INTRAVENOUS EVERY 5 MIN PRN
Status: DISCONTINUED | OUTPATIENT
Start: 2017-03-19 | End: 2017-03-22 | Stop reason: HOSPADM

## 2017-03-19 RX ADMIN — METOPROLOL TARTRATE 5 MG: 5 INJECTION INTRAVENOUS at 10:03

## 2017-03-19 RX ADMIN — Medication 3 ML: at 06:03

## 2017-03-19 RX ADMIN — Medication 3 ML: at 02:03

## 2017-03-19 RX ADMIN — QUETIAPINE FUMARATE 25 MG: 25 TABLET, FILM COATED ORAL at 08:03

## 2017-03-19 RX ADMIN — ACETAMINOPHEN 650 MG: 325 TABLET ORAL at 08:03

## 2017-03-19 RX ADMIN — TIMOLOL MALEATE 1 DROP: 5 SOLUTION OPHTHALMIC at 09:03

## 2017-03-19 RX ADMIN — METOPROLOL SUCCINATE 25 MG: 25 TABLET, EXTENDED RELEASE ORAL at 09:03

## 2017-03-19 RX ADMIN — LATANOPROST 1 DROP: 50 SOLUTION/ DROPS OPHTHALMIC at 08:03

## 2017-03-19 RX ADMIN — ENOXAPARIN SODIUM 40 MG: 100 INJECTION SUBCUTANEOUS at 05:03

## 2017-03-19 RX ADMIN — Medication 3 ML: at 08:03

## 2017-03-19 RX ADMIN — ESCITALOPRAM 20 MG: 20 TABLET, FILM COATED ORAL at 09:03

## 2017-03-19 RX ADMIN — TIMOLOL MALEATE 1 DROP: 5 SOLUTION OPHTHALMIC at 08:03

## 2017-03-19 RX ADMIN — HALOPERIDOL LACTATE 2 MG: 5 INJECTION, SOLUTION INTRAMUSCULAR at 08:03

## 2017-03-19 RX ADMIN — RAMELTEON 8 MG: 8 TABLET, FILM COATED ORAL at 08:03

## 2017-03-19 NOTE — PLAN OF CARE
Problem: Patient Care Overview  Goal: Plan of Care Review  Outcome: Ongoing (interventions implemented as appropriate)  Reviewed care plan with patient.  Uneventful shift. Patient free of falls. Vital signs stable see flow sheet. Ongoing with care plan for discharge. Patients wife refused his 0400am vital signs due to patient finally sleeping.

## 2017-03-19 NOTE — SUBJECTIVE & OBJECTIVE
Interval History: patient was seen today, sleeping this morning, but no active issues overnight.     Review of Systems   Unable to perform ROS: Dementia   Respiratory: Negative for shortness of breath.    Cardiovascular: Negative for chest pain.   Gastrointestinal: Negative for abdominal pain.     Objective:     Vital Signs (Most Recent):  Temp: 98.2 °F (36.8 °C) (03/19/17 1135)  Pulse: 72 (03/19/17 1300)  Resp: 16 (03/19/17 1135)  BP: 134/72 (03/19/17 1135)  SpO2: 96 % (03/19/17 1135) Vital Signs (24h Range):  Temp:  [97.4 °F (36.3 °C)-98.4 °F (36.9 °C)] 98.2 °F (36.8 °C)  Pulse:  [] 72  Resp:  [11-18] 16  SpO2:  [94 %-96 %] 96 %  BP: (117-150)/(63-92) 134/72     Weight: 76 kg (167 lb 8.8 oz)  Body mass index is 24.74 kg/(m^2).    Intake/Output Summary (Last 24 hours) at 03/19/17 1328  Last data filed at 03/19/17 0500   Gross per 24 hour   Intake              240 ml   Output              640 ml   Net             -400 ml      Physical Exam   Constitutional: No distress.   HENT:   Bruises on left side of the forehead.    Eyes: No scleral icterus.   Neck: No JVD present.   Cardiovascular: Normal rate and regular rhythm.    No murmur heard.  Pulmonary/Chest: Effort normal and breath sounds normal. No respiratory distress. He has no wheezes.   Abdominal: Soft. He exhibits no distension. There is no tenderness.   Musculoskeletal: He exhibits no edema or tenderness.   Neurological: He is alert.   Oriented to self and person.    Skin: Skin is warm. He is not diaphoretic.       Significant Labs:   CBC:   Recent Labs  Lab 03/18/17  0345 03/19/17  0502   WBC 7.08 7.30   HGB 12.4* 12.4*   HCT 37.4* 37.0*    187     CMP:   Recent Labs  Lab 03/18/17  0345 03/19/17  0502    137   K 3.8 3.6    98   CO2 30* 31*   * 118*   BUN 21 19   CREATININE 0.8 0.8   CALCIUM 9.3 9.1   PROT 6.0 6.3   ALBUMIN 2.9* 2.9*   BILITOT 0.8 1.0   ALKPHOS 67 73   AST 11 14   ALT 10 9*   ANIONGAP 8 8   EGFRNONAA >60.0 >60.0

## 2017-03-19 NOTE — PROGRESS NOTES
[facility] Hospital Medicine  Progress Note    Patient Name: Migue Fraser  MRN: [id number]  Patient Class: IP- Inpatient   Admission Date: 3/10/2017  Length of Stay: 8 days  Attending Physician: María Malloy MD  Primary Care Provider: Iggy Telles MD    Hospital Medicine Team: [facility] 5 Shane Bruce MD    Subjective:     Principal Problem:Traumatic subarachnoid hematoma with loss of consciousness    HPI:  89 yo M w pmhx/o SSS and A-fib (on Elaquis), prostate cancer, and dementia who was experienced an unwitnessed fall at home earlier today. Pt was found down in his back yard by his son. CT scan demonstrated multiple parenchymal hemorrhages with superimposed SAH. Pt has dementia at baseline but according to son is markedly more confused than usual.     Hospital Course:  On admit, patient was administered PCC; and neurosurgery was consulted. Repeat CT scans showed stable small intracranial sub arachnoid hemorrhages. Hospital course remarkable for agitation; requiring intermittent precedex ggt which was discontinued and transitioned to seroquel BID with PRN haldol. Patient otherwise neurologically stable and at patient's cognitive baseline. Home med metoprolol restarted for a fib. Stepped down to the floor in 3/17/2017 and was seen in the morning and was sleepy CT scan ruled out any further progression, and desating overnight, and started on 2 L NC, CXR was normal, seen by neurosurgery and said his mental status was waxing and waning during his stay, seen by SLP and cleared him from swallowing perspective and avoid feeding him while he is sleepy. Next day he is more alert.     Interval History: patient was seen today, sleeping this morning, but no active issues overnight.     Review of Systems   Unable to perform ROS: Dementia   Respiratory: Negative for shortness of breath.    Cardiovascular: Negative for chest pain.   Gastrointestinal: Negative for abdominal pain.      Objective:     Vital Signs (Most Recent):  Temp: 98.2 °F (36.8 °C) (03/19/17 1135)  Pulse: 72 (03/19/17 1300)  Resp: 16 (03/19/17 1135)  BP: 134/72 (03/19/17 1135)  SpO2: 96 % (03/19/17 1135) Vital Signs (24h Range):  Temp:  [97.4 °F (36.3 °C)-98.4 °F (36.9 °C)] 98.2 °F (36.8 °C)  Pulse:  [] 72  Resp:  [11-18] 16  SpO2:  [94 %-96 %] 96 %  BP: (117-150)/(63-92) 134/72     Weight: 76 kg (167 lb 8.8 oz)  Body mass index is 24.74 kg/(m^2).    Intake/Output Summary (Last 24 hours) at 03/19/17 1328  Last data filed at 03/19/17 0500   Gross per 24 hour   Intake              240 ml   Output              640 ml   Net             -400 ml      Physical Exam   Constitutional: No distress.   HENT:   Bruises on left side of the forehead.    Eyes: No scleral icterus.   Neck: No JVD present.   Cardiovascular: Normal rate and regular rhythm.    No murmur heard.  Pulmonary/Chest: Effort normal and breath sounds normal. No respiratory distress. He has no wheezes.   Abdominal: Soft. He exhibits no distension. There is no tenderness.   Musculoskeletal: He exhibits no edema or tenderness.   Neurological: He is alert.   Oriented to self and person.    Skin: Skin is warm. He is not diaphoretic.       Significant Labs:   CBC:   Recent Labs  Lab 03/18/17  0345 03/19/17  0502   WBC 7.08 7.30   HGB 12.4* 12.4*   HCT 37.4* 37.0*    187     CMP:   Recent Labs  Lab 03/18/17  0345 03/19/17  0502    137   K 3.8 3.6    98   CO2 30* 31*   * 118*   BUN 21 19   CREATININE 0.8 0.8   CALCIUM 9.3 9.1   PROT 6.0 6.3   ALBUMIN 2.9* 2.9*   BILITOT 0.8 1.0   ALKPHOS 67 73   AST 11 14   ALT 10 9*   ANIONGAP 8 8   EGFRNONAA >60.0 >60.0         Assessment/Plan:      Atrial fibrillation  -- was on eliquis at home and was held during admission due to subarachnoid hemorrhage and given kecentra.  -- currently his HR is normal with normal blood pressure. On toprol-xl 25 mg daily.   -- due to his risk of fall, avoiding  anticoagulation is advised.       Agitation requiring sedation protocol  -- needed sedation in the ICU  -- currently not agitated.  -- on Seroquel regular and haldol prn       SAH (subarachnoid hemorrhage)  -- seen by neurosurgery and no intervention was done.  -- repeated CT scan was stable.         Debility  -- PT/OT recommend SNF.       Hypophosphatemia, resolved as of 3/19/2017  -- replace with Na phosphate 15 mm      VTE Risk Mitigation         Ordered     enoxaparin injection 40 mg  Daily     Route:  Subcutaneous        03/19/17 0705          Shane Bruce MD  Department of Hospital Medicine   [facility]

## 2017-03-20 LAB
ALBUMIN SERPL BCP-MCNC: 2.8 G/DL
ALP SERPL-CCNC: 94 U/L
ALT SERPL W/O P-5'-P-CCNC: 17 U/L
ANION GAP SERPL CALC-SCNC: 12 MMOL/L
AST SERPL-CCNC: 19 U/L
BASOPHILS # BLD AUTO: 0.02 K/UL
BASOPHILS NFR BLD: 0.3 %
BILIRUB SERPL-MCNC: 1.2 MG/DL
BUN SERPL-MCNC: 19 MG/DL
CALCIUM SERPL-MCNC: 9.3 MG/DL
CHLORIDE SERPL-SCNC: 98 MMOL/L
CO2 SERPL-SCNC: 27 MMOL/L
CREAT SERPL-MCNC: 0.8 MG/DL
DIFFERENTIAL METHOD: ABNORMAL
EOSINOPHIL # BLD AUTO: 0 K/UL
EOSINOPHIL NFR BLD: 0.4 %
ERYTHROCYTE [DISTWIDTH] IN BLOOD BY AUTOMATED COUNT: 13.1 %
EST. GFR  (AFRICAN AMERICAN): >60 ML/MIN/1.73 M^2
EST. GFR  (NON AFRICAN AMERICAN): >60 ML/MIN/1.73 M^2
GLUCOSE SERPL-MCNC: 129 MG/DL
HCT VFR BLD AUTO: 39.2 %
HGB BLD-MCNC: 13.3 G/DL
LYMPHOCYTES # BLD AUTO: 1.1 K/UL
LYMPHOCYTES NFR BLD: 14.3 %
MAGNESIUM SERPL-MCNC: 1.8 MG/DL
MCH RBC QN AUTO: 34.6 PG
MCHC RBC AUTO-ENTMCNC: 33.9 %
MCV RBC AUTO: 102 FL
MONOCYTES # BLD AUTO: 1.1 K/UL
MONOCYTES NFR BLD: 13.6 %
NEUTROPHILS # BLD AUTO: 5.7 K/UL
NEUTROPHILS NFR BLD: 71.3 %
PHOSPHATE SERPL-MCNC: 3.8 MG/DL
PLATELET # BLD AUTO: 168 K/UL
PMV BLD AUTO: 10.1 FL
POTASSIUM SERPL-SCNC: 4.1 MMOL/L
PROT SERPL-MCNC: 6.6 G/DL
RBC # BLD AUTO: 3.84 M/UL
SODIUM SERPL-SCNC: 137 MMOL/L
WBC # BLD AUTO: 8 K/UL

## 2017-03-20 PROCEDURE — 83735 ASSAY OF MAGNESIUM: CPT

## 2017-03-20 PROCEDURE — 97535 SELF CARE MNGMENT TRAINING: CPT

## 2017-03-20 PROCEDURE — 97530 THERAPEUTIC ACTIVITIES: CPT

## 2017-03-20 PROCEDURE — 63600175 PHARM REV CODE 636 W HCPCS: Performed by: STUDENT IN AN ORGANIZED HEALTH CARE EDUCATION/TRAINING PROGRAM

## 2017-03-20 PROCEDURE — 80053 COMPREHEN METABOLIC PANEL: CPT

## 2017-03-20 PROCEDURE — 20600001 HC STEP DOWN PRIVATE ROOM

## 2017-03-20 PROCEDURE — 25000003 PHARM REV CODE 250: Performed by: STUDENT IN AN ORGANIZED HEALTH CARE EDUCATION/TRAINING PROGRAM

## 2017-03-20 PROCEDURE — 25000003 PHARM REV CODE 250: Performed by: PSYCHIATRY & NEUROLOGY

## 2017-03-20 PROCEDURE — 85025 COMPLETE CBC W/AUTO DIFF WBC: CPT

## 2017-03-20 PROCEDURE — 36415 COLL VENOUS BLD VENIPUNCTURE: CPT

## 2017-03-20 PROCEDURE — 25000003 PHARM REV CODE 250: Performed by: ANESTHESIOLOGY

## 2017-03-20 PROCEDURE — 92526 ORAL FUNCTION THERAPY: CPT

## 2017-03-20 PROCEDURE — 97116 GAIT TRAINING THERAPY: CPT

## 2017-03-20 PROCEDURE — 99233 SBSQ HOSP IP/OBS HIGH 50: CPT | Mod: ,,, | Performed by: INTERNAL MEDICINE

## 2017-03-20 PROCEDURE — 84100 ASSAY OF PHOSPHORUS: CPT

## 2017-03-20 RX ORDER — NAPROXEN SODIUM 220 MG/1
81 TABLET, FILM COATED ORAL DAILY
Refills: 0 | Status: CANCELLED | COMMUNITY
Start: 2017-03-20 | End: 2018-03-20

## 2017-03-20 RX ADMIN — METOPROLOL SUCCINATE 25 MG: 25 TABLET, EXTENDED RELEASE ORAL at 08:03

## 2017-03-20 RX ADMIN — Medication 3 ML: at 09:03

## 2017-03-20 RX ADMIN — RAMELTEON 8 MG: 8 TABLET, FILM COATED ORAL at 09:03

## 2017-03-20 RX ADMIN — TIMOLOL MALEATE 1 DROP: 5 SOLUTION OPHTHALMIC at 09:03

## 2017-03-20 RX ADMIN — TIMOLOL MALEATE 1 DROP: 5 SOLUTION OPHTHALMIC at 08:03

## 2017-03-20 RX ADMIN — ESCITALOPRAM 20 MG: 20 TABLET, FILM COATED ORAL at 08:03

## 2017-03-20 RX ADMIN — Medication 3 ML: at 02:03

## 2017-03-20 RX ADMIN — LATANOPROST 1 DROP: 50 SOLUTION/ DROPS OPHTHALMIC at 09:03

## 2017-03-20 RX ADMIN — Medication 3 ML: at 06:03

## 2017-03-20 RX ADMIN — ENOXAPARIN SODIUM 40 MG: 100 INJECTION SUBCUTANEOUS at 04:03

## 2017-03-20 RX ADMIN — QUETIAPINE FUMARATE 25 MG: 25 TABLET, FILM COATED ORAL at 09:03

## 2017-03-20 NOTE — ASSESSMENT & PLAN NOTE
1-- needed sedation in the ICU  -- currently not agitated.  -- on Seroquel regular and haldol prn

## 2017-03-20 NOTE — PT/OT/SLP PROGRESS
Physical Therapy  Treatment    Migue Fraser   MRN: [id number]   Admitting Diagnosis: Traumatic subarachnoid hematoma with loss of consciousness    PT Received On: 17  PT Start Time: 1245     PT Stop Time: 1310    PT Total Time (min): 25 min       Billable Minutes:  Gait [id number] and Therapeutic Activity 10    Treatment Type: Treatment  PT/PTA: PT     PTA Visit Number: 1       General Precautions: Standard, aspiration, fall, seizure  Orthopedic Precautions: N/A   Braces: N/A    Do you have any cultural, spiritual, Lutheran conflicts, given your current situation?: none noted    Subjective:  Communicated with nsg prior to session.      Pain Ratin/10            Pt asleep on first attempt. Agreeable to treatment session later in day.       Objective:   Patient found with: telemetry; found supine in bed c/ wife at bedside    Functional Mobility:  Bed Mobility:   Supine to Sit: Moderate Assistance (req incr assistance c/ rolling and hip advancement)    Transfers:  Sit <> Stand Assistance: Moderate Assistance, Maximum Assistance (x5 trial from bedside chair with MAX A, x 1 trial at bedside with Mod A; req assistance c/ hand and (B) LE placement; req forward trunk lean to assist c/ advancement)  Sit <> Stand Assistive Device: Rolling Walker  Bed <> Chair Technique: Stand Pivot  Bed <> Chair Assistance: Moderate Assistance  Bed <> Chair Assistive Device: No Assistive Device    Gait:   Gait Distance: 100 ft; slowed viral with incr forward trunk lean; req cueing to scan environment and remain with upright posture  Assistance 1: Minimum assistance  Gait Assistive Device: Rolling walker  Gait Pattern: 3-point gait  Gait Deviation(s): decreased viral, forward lean, decreased stride length, decreased step length    Balance:   Static Sit: GOOD: Takes MODERATE challenges from all directions  Dynamic Sit: GOOD-: Maintains balance through MODERATE excursions of active trunk movement,     Static Stand: FAIR+:  Takes MINIMAL challenges from all directions  Dynamic stand: FAIR+: Needs CLOSE SUPERVISION during gait and is able to right self with minor LOB     Therapeutic Activities and Exercises:  Pt stood at bedside chair for ~ 2 minutes with MIN A and RW usage  - slight posterior trunk lean and kyphotic posture identified req cueing to correct    THERAPEUTIC EXERCISE  Pt performed therapeutic exercise sit to stand transfer for 5 reps   - req cueing to widen INOCENCIA and for even distribution of weight through (B) LE.  Req cueing for hand placement for ascent and descent    EDUCATION  Pt educated pt on incr OOB activity including sitting in bedside chair majority of day and amb with nsg and PCT.   Educated pt on being appropriate to transfer with nsg and PCT  Updated white board with appropriate PT information; notified nsg ;notifed nsg of pt condom catheter being removed at the end of the session.   Pt verbalized agreement.       AM-PAC 6 CLICK MOBILITY  How much help from another person does this patient currently need?   1 = Unable, Total/Dependent Assistance  2 = A lot, Maximum/Moderate Assistance  3 = A little, Minimum/Contact Guard/Supervision  4 = None, Modified [place]/Independent    Turning over in bed (including adjusting bedclothes, sheets and blankets)?: 2  Sitting down on and standing up from a chair with arms (e.g., wheelchair, bedside commode, etc.): 2  Moving from lying on back to sitting on the side of the bed?: 2  Moving to and from a bed to a chair (including a wheelchair)?: 3  Need to walk in hospital room?: 3  Climbing 3-5 steps with a railing?: 2  Total Score: 14    AM-PAC Raw Score CMS G-Code Modifier Level of Impairment Assistance   6 % Total / Unable   7 - 9 CM 80 - 100% Maximal Assist   10 - 14 CL 60 - 80% Moderate Assist   15 - 19 CK 40 - 60% Moderate Assist   20 - 22 CJ 20 - 40% Minimal Assist   23 CI 1-20% SBA / CGA   24 CH 0% Independent/ Mod I     Patient left up in chair with all  lines intact and call button in reach.    Assessment:  Migue Fraser is a 89 y.o. male with a medical diagnosis of Traumatic subarachnoid hematoma with loss of consciousness and presents with improvement with gait distance and tolerance to activity. Continues to req repetitive verbal and tactile cueing for safety and incr ease of transfer. Pt remains appropriate for continued skilled services and discharge to postacute location to address the below deficits and improve pt return to PLOF.      Rehab identified problem list/impairments: Rehab identified problem list/impairments: weakness, impaired endurance, impaired balance, gait instability, decreased lower extremity function, visual deficits, impaired cognition, decreased coordination, decreased safety awareness    Rehab potential is good.    Activity tolerance: Fair    Discharge recommendations: Discharge Facility/Level Of Care Needs: nursing facility, skilled (SS)     Barriers to discharge: Barriers to Discharge: Decreased caregiver support, Inaccessible home environment    Equipment recommendations: Equipment Needed After Discharge:  (TBD pending progress)     GOALS:   Physical Therapy Goals        Problem: Physical Therapy Goal    Goal Priority Disciplines Outcome Goal Variances Interventions   Physical Therapy Goal     PT/OT, PT Ongoing (interventions implemented as appropriate)     Description:  PT goals for the next 10 visits    1. Pt supine to sit with minimal assist-not met  2. Pt sit to supine with CGA-not met  3. Pt sit to stand with RW with CGA-not met  4. Pt to perform gait 100ft with RW with minimal assist.-not met  5. Pt to transfer bed to/from bedside chair with RW with minimal assist.-MET 3/16  6. Pt to up/down 6 steps with B UE rails with minimal assist.-not met   7. Pt to perform B LE exs in sitting or supine x 20 reps with family assist.-not met                 PLAN:    Patient to be seen 5 x/week  to address the above listed problems  via gait training, therapeutic activities, therapeutic exercises, neuromuscular re-education  Plan of Care expires: 04/11/17  Plan of Care reviewed with: patient, spouse         Yoon Rocha, PT , DPT  03/20/2017

## 2017-03-20 NOTE — SUBJECTIVE & OBJECTIVE
Interval History: Overnight Mr. Desai had an episode of afib with RVR with rate to 138. He was given IV lopressor. He remained in afib this morning. He has a history of paroxysmal afib and has previously been in sinus rhythm based on progress note dated 3/19/17. Patient is at baseline cognitive function. Vital signs this morning were stable. Kidney function is at baseline. He is pending disposition to SNF.     Review of Systems   Unable to perform ROS: Dementia     Objective:     Vital Signs (Most Recent):  Temp: 97.3 °F (36.3 °C) (03/20/17 0730)  Pulse: 72 (03/20/17 0900)  Resp: 15 (03/20/17 0730)  BP: 136/72 (03/20/17 0730)  SpO2: 98 % (03/20/17 0730) Vital Signs (24h Range):  Temp:  [96.4 °F (35.8 °C)-99.4 °F (37.4 °C)] 97.3 °F (36.3 °C)  Pulse:  [] 72  Resp:  [14-16] 15  SpO2:  [95 %-98 %] 98 %  BP: ()/(62-86) 136/72     Weight: 76 kg (167 lb 8.8 oz)  Body mass index is 24.74 kg/(m^2).    Intake/Output Summary (Last 24 hours) at 03/20/17 1140  Last data filed at 03/20/17 1100   Gross per 24 hour   Intake                3 ml   Output              850 ml   Net             -847 ml      Physical Exam   Constitutional: No distress.   HENT:   Bruises on left side of the forehead.    Eyes: No scleral icterus.   Neck: No JVD present.   Cardiovascular: Normal rate.  An irregularly irregular rhythm present.   No murmur heard.  Pulmonary/Chest: Effort normal and breath sounds normal. No respiratory distress. He has no wheezes.   Abdominal: Soft. He exhibits no distension. There is no tenderness.   Musculoskeletal: He exhibits no edema or tenderness.   Neurological: He is alert.   Oriented to self and person.    Skin: Skin is warm. He is not diaphoretic.   Bruises on left shoulder        Significant Labs: All pertinent labs within the past 24 hours have been reviewed.    Significant Imaging: I have reviewed all pertinent imaging results/findings within the past 24 hours.

## 2017-03-20 NOTE — PLAN OF CARE
Problem: Patient Care Overview  Goal: Plan of Care Review  Outcome: Ongoing (interventions implemented as appropriate)  Reviewed care plan with patient. Patient free of falls. Vital signs stable see flow sheet. Ongoing with care plan for discharge. Pt given metoprolol 5mg for ST. pts HR came back down to normal range.

## 2017-03-20 NOTE — PLAN OF CARE
03/20/17 1115   Right Care Assessment   Can the patient answer the patient profile reliably? No, cognitively impaired   How often would a person be available to care for the patient? Whenever needed   Describe the patient's ability to walk at the present time. Major restrictions/daily assistance from another person   How does the patient rate their overall health at the present time? (unable to assess)   Number of comorbid conditions (as recorded on the chart) Three   During the past month, has the patient often been bothered by feeling down, depressed or hopeless? (unable to assess)   During the past month, has the patient often been bothered by little interest or pleasure in doing things? (unable to assess)     Male friend caregiver at bedside.  Pt seems more confused today but conversant unaware of caregiver name at bedside and place he is located.  Spouse on her way to hospital.  Spouse very adamant on pt going to [facility] and informed of pending acceptance.    Plan A: SNF  Plan B: HH

## 2017-03-20 NOTE — PLAN OF CARE
Problem: Physical Therapy Goal  Goal: Physical Therapy Goal  PT goals for the next 10 visits    1. Pt supine to sit with minimal assist-not met  2. Pt sit to supine with CGA-not met  3. Pt sit to stand with RW with CGA-not met  4. Pt to perform gait 100ft with RW with minimal assist.-not met  5. Pt to transfer bed to/from bedside chair with RW with minimal assist.-MET 3/16  6. Pt to up/down 6 steps with B UE rails with minimal assist.-not met   7. Pt to perform B LE exs in sitting or supine x 20 reps with family assist.-not met   Outcome: Ongoing (interventions implemented as appropriate)  No goals met on today. Goals remain appropriate.     Yoon Rocha, PT, DPT  3/20/2017

## 2017-03-20 NOTE — PT/OT/SLP PROGRESS
Occupational Therapy  Treatment    Migue Fraser   MRN: [id number]   Admitting Diagnosis: Traumatic subarachnoid hematoma with loss of consciousness    OT Date of Treatment: 17   OT Start Time: 804  OT Stop Time: 833  OT Total Time (min): 29 min    Billable Minutes:  Self Care/Home Management 20 and Therapeutic Activity 9    General Precautions: Standard, fall, aspiration, seizure  Orthopedic Precautions: N/A  Braces: N/A         Subjective:  Communicated with RN prior to session.  Pt agreeable to participate with therapy.    Pain Ratin/10              Pain Rating Post-Intervention: 0/10    Objective:  Patient found with: telemetry     Functional Mobility:  Bed Mobility:  Supine to Sit: Moderate Assistance with tactile/verbal cues for task  initiation  Sit to Supine: Pt left sitting up in BS chair    Transfers:   Sit <> Stand Assistance: Moderate Assistance (x2 trials from EOB); cues for proper hand placement  Sit <> Stand Assistive Device: Rolling Walker    Bed <> Chair Technique: Stand Pivot  Bed <> Chair Transfer Assistance: Minimum Assistance  Bed <> Chair Assistive Device: Rolling Walker    Functional Ambulation: Min A using RW within room; assist for balance and for navigating environment; cues for upward gaze    Activities of Daily Living:  Grooming Position: Standing  Grooming Level of Assistance: Moderate assistance to wash face and brush teeth; assist for balance, task initiation/sequencing, and for container management.    Balance:   Static Sit: FAIR+: Able to take MINIMAL challenges from all directions  Dynamic Sit: FAIR+: Maintains balance through MINIMAL excursions of active trunk motion  Static Stand: POOR+: Needs MINIMAL assist to maintain  Dynamic stand: POOR: Min A for functional mobility    Therapeutic Activities and Exercises:  Pt performed ~5-6 min dynamic standing while performing grooming with Min A for balance. Pt cued to utilize unilateral UE support on tray table for  increased stability demonstrating fair understanding at times. Cues to increase upright posture with good follow through.    AM-PAC 6 CLICK ADL   How much help from another person does this patient currently need?   1 = Unable, Total/Dependent Assistance  2 = A lot, Maximum/Moderate Assistance  3 = A little, Minimum/Contact Guard/Supervision  4 = None, Modified [place]/Independent    Putting on and taking off regular lower body clothing? : 2  Bathing (including washing, rinsing, drying)?: 2  Toileting, which includes using toilet, bedpan, or urinal? : 2  Putting on and taking off regular upper body clothing?: 2  Taking care of personal grooming such as brushing teeth?: 2  Eating meals?: 2  Total Score: 12     AM-PAC Raw Score CMS G-Code Modifier Level of Impairment Assistance   6 % Total / Unable   7 - 9 CM 80 - 100% Maximal Assist   10 - 14 CL 60 - 80% Moderate Assist   15 - 19 CK 40 - 60% Moderate Assist   20 - 22 CJ 20 - 40% Minimal Assist   23 CI 1-20% SBA / CGA   24 CH 0% Independent/ Mod I     Patient left up in chair with all lines intact, call button in reach and friend present    ASSESSMENT:  Migue Fraser is a 89 y.o. male with a medical diagnosis of Traumatic subarachnoid hematoma with loss of consciousness. He presents with good participation and motivation during session. Pt progressed this date by performing grooming in standing with Mod A and functional transfers with Min-Mod A using RW. Improvements noted with command following. Pt continues to benefit from skilled OT to improve deficits noted below to increase (I) and safety with ADL performance.    Rehab identified problem list/impairments: Rehab identified problem list/impairments: weakness, impaired endurance, impaired self care skills, impaired functional mobilty, impaired balance, impaired cognition, decreased coordination, decreased lower extremity function, decreased safety awareness    Rehab potential is  good.    Activity tolerance: Good    Discharge recommendations: Discharge Facility/Level Of Care Needs: nursing facility, skilled     Barriers to discharge: Barriers to Discharge: Inaccessible home environment, Decreased caregiver support    Equipment recommendations:  (TBD pending progress)     GOALS:   Occupational Therapy Goals        Problem: Occupational Therapy Goal    Goal Priority Disciplines Outcome Interventions   Occupational Therapy Goal     OT, PT/OT Ongoing (interventions implemented as appropriate)    Description:  Goals to be met by: 3/25     Patient will increase functional independence with ADLs by performing:    UE Dressing while seated EOB with Set-up Assistance and Minimal Assistance.  LE Dressing while seated EOB with Set-up Assistance and Contact Guard Assistance.  Grooming while seated at sink with Set-up Assistance and Minimal Assistance.  Toileting from bedside commode with Minimal Assistance for hygiene and clothing management.   Sitting at edge of bed x10 minutes for dynamic functional task with Contact Guard Assistance.  Supine to sit with Contact Guard Assistance.   Toilet transfer to bedside commode with Contact Guard Assistance.  CG demo understanding for positioning and bed mobility.                  Plan:  Patient to be seen 4 x/week to address the above listed problems via self-care/home management, therapeutic activities, therapeutic exercises, neuromuscular re-education, cognitive retraining  Plan of Care expires: 04/10/17  Plan of Care reviewed with: patient, friend         Jazzmine Hammer OTR/L  03/20/2017

## 2017-03-20 NOTE — PLAN OF CARE
1:35 PM. [facility]LL[id number] Informed they have submitted for insurance auth.       VIKKI Navas LMSW  [id number]

## 2017-03-20 NOTE — PROGRESS NOTES
[facility] Hospital Medicine  Progress Note    Patient Name: Migue Fraser  MRN: [id number]  Patient Class: IP- Inpatient   Admission Date: 3/10/2017  Length of Stay: 9 days  Attending Physician: María Malloy MD  Primary Care Provider: Iggy Telles MD    Hospital Medicine Team: [facility] HOSP MED 5 Hal Goetz MD    Subjective:     Principal Problem:Traumatic subarachnoid hematoma with loss of consciousness    HPI:  89 yo M w PMHx of SSS and A-fib (on Elaquis), prostate cancer, and dementia who was experienced an unwitnessed fall at home. Pt was found down in his back yard by his son. CT scan demonstrated multiple parenchymal hemorrhages with superimposed SAH. Pt has dementia at baseline but according to son is markedly more confused than usual.     Hospital Course:  On admit, patient was administered PCC; and neurosurgery was consulted. Repeat CT scans showed stable small intracranial sub arachnoid hemorrhages. Hospital course remarkable for agitation; requiring intermittent precedex ggt which was discontinued and transitioned to seroquel BID with PRN haldol. Patient otherwise neurologically stable and at patient's cognitive baseline. Home med metoprolol restarted for a fib. Stepped down to the floor in 3/17/2017 and was seen in the morning and was sleepy CT scan ruled out any further progression, and desating overnight, and started on 2 L NC, CXR was normal, seen by neurosurgery and said his mental status was waxing and waning during his stay, seen by SLP and cleared him from swallowing perspective and avoid feeding him while he is sleepy. He is currently stable and pending SNF placement.     Interval History: Overnight Mr. Desai had an episode of afib with RVR with rate to 138. He was given IV lopressor. He remained in afib this morning. He has a history of paroxysmal afib and has previously been in sinus rhythm based on progress note dated 3/19/17. Patient is at baseline  cognitive function. Vital signs this morning were stable. Kidney function is at baseline. He is pending disposition to SNF.     Review of Systems   Unable to perform ROS: Dementia     Objective:     Vital Signs (Most Recent):  Temp: 97.3 °F (36.3 °C) (03/20/17 0730)  Pulse: 72 (03/20/17 0900)  Resp: 15 (03/20/17 0730)  BP: 136/72 (03/20/17 0730)  SpO2: 98 % (03/20/17 0730) Vital Signs (24h Range):  Temp:  [96.4 °F (35.8 °C)-99.4 °F (37.4 °C)] 97.3 °F (36.3 °C)  Pulse:  [] 72  Resp:  [14-16] 15  SpO2:  [95 %-98 %] 98 %  BP: ()/(62-86) 136/72     Weight: 76 kg (167 lb 8.8 oz)  Body mass index is 24.74 kg/(m^2).    Intake/Output Summary (Last 24 hours) at 03/20/17 1140  Last data filed at 03/20/17 1100   Gross per 24 hour   Intake                3 ml   Output              850 ml   Net             -847 ml      Physical Exam   Constitutional: No distress.   HENT:   Bruises on left side of the forehead.    Eyes: No scleral icterus.   Neck: No JVD present.   Cardiovascular: Normal rate.  An irregularly irregular rhythm present.   No murmur heard.  Pulmonary/Chest: Effort normal and breath sounds normal. No respiratory distress. He has no wheezes.   Abdominal: Soft. He exhibits no distension. There is no tenderness.   Musculoskeletal: He exhibits no edema or tenderness.   Neurological: He is alert.   Oriented to self and person.    Skin: Skin is warm. He is not diaphoretic.   Bruises on left shoulder        Significant Labs: All pertinent labs within the past 24 hours have been reviewed.    Significant Imaging: I have reviewed all pertinent imaging results/findings within the past 24 hours.    Assessment/Plan:      Atrial fibrillation  -- was on eliquis at home and was held during admission due to subarachnoid hemorrhage and given kecentra.  -- This morning his HR is normal with normal blood pressure. On toprol-xl 25 mg daily. Will consider increasing toprol 25 mg if there is persistent tachycardia.   -- due to  his risk of fall, avoiding anticoagulation is advised. Will consider restarting aspirin on discharge to provide some coverage.         Agitation requiring sedation protocol  -- needed sedation in the ICU  -- currently not agitated.  -- on Seroquel regular and haldol prn         SAH (subarachnoid hemorrhage)  -- seen by neurosurgery and no intervention was done.  -- repeated CT scan was stable.         Debility  -- PT/OT recommend SNF.         Hypophosphatemia  -- resolved    Dispo: Pending SNF placement.       VTE Risk Mitigation         Ordered     enoxaparin injection 40 mg  Daily     Route:  Subcutaneous        03/19/17 0705          Hal Goetz MD  Department of Hospital Medicine   [facility]    Formal staff attestation to follow by Dr. Velez.

## 2017-03-20 NOTE — PLAN OF CARE
[facility] [phone] spoke to Nonya states she received half of the referral asked to refaxed it again    [facility] declined per Ariadne Fuentes no male beds per right care    [facility] [phone] Chao received referral

## 2017-03-20 NOTE — PLAN OF CARE
SSc sent referral to :      [facility] ( p [phone]) ( f [phone])   [facility] ( connected)   [facility]  ( p [phone])  ( f [phone])       [facility]

## 2017-03-20 NOTE — PT/OT/SLP PROGRESS
"Speech Language Pathology  Treatment    Migue Fraser   MRN: [id number]   Admitting Diagnosis: Traumatic subarachnoid hematoma with loss of consciousness    Diet recommendations: Solid Diet Level: Regular  Liquid Diet Level: Thin Feed only when awake/alert, HOB to 90 degrees, Small bites/sips, 1 bite/sip at a time, Meds whole 1 at a time, Eliminate distractions and Avoid talking while eating    SLP Treatment Date: 17  Speech Start Time: 1334     Speech Stop Time: 1354     Speech Total (min): 20 min       TREATMENT BILLABLE MINUTES:  Treatment Swallowing Dysfunction 10 and Seld Care/Home Management Training 10    Has the patient been evaluated by SLP for swallowing? : Yes  Keep patient NPO?: No   General Precautions: Standard, aspiration, fall, seizure  Current Respiratory Status: nasal cannula       Subjective:  "He did really well w/ lunch."  Pt's wife stated    Pain Ratin/10              Pain Rating Post-Intervention: 0/10    Objective:   Patient found with: telemetry    Pt was seen seated upright in a bedside chair.  He was offered and accepted po trials of thin liquids by cup edge and self-fed bite of soft solids.  He tolerated all trials well w/ no overt s/s of aspiration and adequate oral clearance.  Education was provided to pt and spouse re: aspiration precautions listed above, s/s of aspiration, risk of aspiration and diet recs.  Pt's wife indicated good understanding.      FIM:                                 Assessment:  Migue Fraser is a 89 y.o. male with a medical diagnosis of Traumatic subarachnoid hematoma with loss of consciousness and presents with dysphagia.    Discharge recommendations: Discharge Facility/Level Of Care Needs: nursing facility, skilled     Goals:   SLP Goals        Problem: SLP Goal    Goal Priority Disciplines Outcome   SLP Goal     SLP Ongoing (interventions implemented as appropriate)   Description:  Speech Language Pathology Goals  Goals expected to be " met by 3/18  1.  Pt will tolerate thin liquids without overt s/s aspiration.  2. Pt will tolerate trials of puree without overt s/s aspiration.  3. Pt will tolerate trials of solids without overt s/s aspiration.                     Plan:   Patient to be seen Therapy Frequency: 5 x/week   Plan of Care expires: 04/16/17  Plan of Care reviewed with: patient, spouse, son  SLP Follow-up?: Yes  SLP - Next Visit Date: 03/13/17           Vidya Jaramillo CCC-SLP  03/20/2017

## 2017-03-20 NOTE — ASSESSMENT & PLAN NOTE
1-- was on eliquis at home and was held during admission due to subarachnoid hemorrhage and given kecentra.  -- currently his HR is normal with normal blood pressure. On toprol-xl 25 mg daily.   -- due to his risk of fall, avoiding anticoagulation is advised.

## 2017-03-20 NOTE — MEDICAL/APP STUDENT
[facility] Hospital Medicine  Progress Note    Patient Name: Migue Fraser  MRN: [id number]  Patient Class: IP- Inpatient   Admission Date: 3/10/2017  Length of Stay: 9 days  Attending Physician: María Malloy MD  Primary Care Provider: Iggy Telles MD    [facility] Medicine Team: [facility] 5 Dasia ROCIO Sosa    Subjective:     Principal Problem:Traumatic subarachnoid hematoma with loss of consciousness    HPI:  89 yo M w pmhx/o SSS and A-fib (on Elaquis), prostate cancer, and dementia who was experienced an unwitnessed fall at home earlier today. Pt was found down in his back yard by his son. CT scan demonstrated multiple parenchymal hemorrhages with superimposed SAH. Pt has dementia at baseline but according to son is markedly more confused than usual.      Hospital Course:  On admit, patient was administered PCC; and neurosurgery was consulted. Repeat CT scans showed stable small intracranial sub arachnoid hemorrhages. Hospital course remarkable for agitation; requiring intermittent precedex ggt which was discontinued and transitioned to seroquel BID with PRN haldol. Patient otherwise neurologically stable and at patient's cognitive baseline. Home med metoprolol restarted for a fib. Stepped down to the floor in 3/17/2017 and was seen in the morning and was sleepy CT scan ruled out any further progression, and desating overnight, and started on 2 L NC, CXR was normal, seen by neurosurgery and said his mental status was waxing and waning during his stay, seen by SLP and cleared him from swallowing perspective and avoid feeding him while he is sleepy. Next day he is more alert.     Interval History: 3/20/17  Had an episode of Afib with RVR to rate at 138bpm, given metoprolol, resolved to sinus rhythm. Currently pt is still at Afib. Pt reports some tolerable level amount of pain. Pt is confused, not able to answer his age or location or current date when questioned. This is his  baseline cognitive function. Otherwise his vitals are stable.    Review of Systems   Constitutional: Negative.    HENT: Negative.    Respiratory: Negative for cough, chest tightness and shortness of breath.    Cardiovascular: Negative for chest pain, palpitations and leg swelling.   Gastrointestinal: Negative for abdominal pain.   Genitourinary: Negative for difficulty urinating, dysuria and flank pain.   Musculoskeletal: Negative for arthralgias (endorses some degree of pain but tolerable).   Neurological: Negative for tremors, facial asymmetry, speech difficulty, weakness, numbness and headaches.   Psychiatric/Behavioral: Positive for confusion (baseline dementia? not oriented to age, location or date).     Objective:     Vital Signs (Most Recent):  Temp: 96.4 °F (35.8 °C) (03/20/17 0354)  Pulse: 73 (03/20/17 0700)  Resp: 16 (03/20/17 0354)  BP: 136/86 (03/20/17 0354)  SpO2: 95 % (03/20/17 0354) Vital Signs (24h Range):  Temp:  [96.4 °F (35.8 °C)-99.4 °F (37.4 °C)] 96.4 °F (35.8 °C)  Pulse:  [] 73  Resp:  [14-16] 16  SpO2:  [95 %-96 %] 95 %  BP: ()/(62-86) 136/86     Weight: 76 kg (167 lb 8.8 oz)  Body mass index is 24.74 kg/(m^2).    Intake/Output Summary (Last 24 hours) at 03/20/17 0852  Last data filed at 03/20/17 0300   Gross per 24 hour   Intake                3 ml   Output              600 ml   Net             -597 ml      Physical Exam   Constitutional: He appears well-developed.       HENT:   Head:       Eyes: Pupils are equal, round, and reactive to light.   Cardiovascular: Normal heart sounds.  An irregularly irregular rhythm present.   Pulmonary/Chest: Effort normal and breath sounds normal.   Abdominal: Soft. Normal appearance and bowel sounds are normal.   Neurological: He is alert. He is disoriented (confused with his age, location and current date).   Left leg weaker than right.   Skin:            Significant Labs:   CBC:   Recent Labs  Lab 03/19/17  0502 03/20/17  0435   WBC 7.30 8.00    HGB 12.4* 13.3*   HCT 37.0* 39.2*    168     CMP:   Recent Labs  Lab 03/19/17  0502 03/20/17  0435    137   K 3.6 4.1   CL 98 98   CO2 31* 27   * 129*   BUN 19 19   CREATININE 0.8 0.8   CALCIUM 9.1 9.3   PROT 6.3 6.6   ALBUMIN 2.9* 2.8*   BILITOT 1.0 1.2*   ALKPHOS 73 94   AST 14 19   ALT 9* 17   ANIONGAP 8 12   EGFRNONAA >60.0 >60.0     POCT Glucose:   Recent Labs  Lab 03/19/17  1150 03/19/17  1718   POCTGLUCOSE 181* 189*     Urine Culture: No results for input(s): LABURIN in the last 48 hours.    Significant Imaging: I have reviewed all pertinent imaging results/findings within the past 24 hours.    Assessment/Plan:   1. Target disposition  - PT/OT recommends SNF, waiting for placement    2. Traumatic brain injury  - last CT is stable  - neurosurgery recommends no intervention  - not anticoagulation candidate     3. Atrial fibrillation  - had an episode at 138bpm, currently stable  - continue with metoprolol and monitor  - if persistent A fib, consider increase metoprolol dose    4. Agitation requiring sedation protocol  - needed sedation in the ICU  - currently not agitated.  - on Seroquel regular and haldol prn     5. Elevated glucose  - continue monitoring  - follow up out patient      Dasia Swan  Department of Hospital Medicine   [facility]

## 2017-03-20 NOTE — PLAN OF CARE
Problem: Occupational Therapy Goal  Goal: Occupational Therapy Goal  Goals to be met by: 3/25     Patient will increase functional independence with ADLs by performing:    UE Dressing while seated EOB with Set-up Assistance and Minimal Assistance.  LE Dressing while seated EOB with Set-up Assistance and Contact Guard Assistance.  Grooming while seated at sink with Set-up Assistance and Minimal Assistance.  Toileting from bedside commode with Minimal Assistance for hygiene and clothing management.   Sitting at edge of bed x10 minutes for dynamic functional task with Contact Guard Assistance.  Supine to sit with Contact Guard Assistance.   Toilet transfer to bedside commode with Contact Guard Assistance.  CG demo understanding for positioning and bed mobility.    Outcome: Ongoing (interventions implemented as appropriate)  Goals remain appropriate this date. Continue OT POC.     SILVANA Luevano/LAUREN  3/20/2017

## 2017-03-21 LAB
ALBUMIN SERPL BCP-MCNC: 2.9 G/DL
ALP SERPL-CCNC: 99 U/L
ALT SERPL W/O P-5'-P-CCNC: 17 U/L
ANION GAP SERPL CALC-SCNC: 13 MMOL/L
AST SERPL-CCNC: 19 U/L
BASOPHILS # BLD AUTO: 0.02 K/UL
BASOPHILS NFR BLD: 0.3 %
BILIRUB SERPL-MCNC: 0.9 MG/DL
BUN SERPL-MCNC: 24 MG/DL
CALCIUM SERPL-MCNC: 9.5 MG/DL
CHLORIDE SERPL-SCNC: 97 MMOL/L
CO2 SERPL-SCNC: 28 MMOL/L
CREAT SERPL-MCNC: 0.8 MG/DL
DIFFERENTIAL METHOD: ABNORMAL
EOSINOPHIL # BLD AUTO: 0 K/UL
EOSINOPHIL NFR BLD: 0.5 %
ERYTHROCYTE [DISTWIDTH] IN BLOOD BY AUTOMATED COUNT: 12.9 %
EST. GFR  (AFRICAN AMERICAN): >60 ML/MIN/1.73 M^2
EST. GFR  (NON AFRICAN AMERICAN): >60 ML/MIN/1.73 M^2
GLUCOSE SERPL-MCNC: 166 MG/DL
HCT VFR BLD AUTO: 36.8 %
HGB BLD-MCNC: 12.7 G/DL
LYMPHOCYTES # BLD AUTO: 1 K/UL
LYMPHOCYTES NFR BLD: 12.9 %
MAGNESIUM SERPL-MCNC: 2 MG/DL
MCH RBC QN AUTO: 35.1 PG
MCHC RBC AUTO-ENTMCNC: 34.5 %
MCV RBC AUTO: 102 FL
MONOCYTES # BLD AUTO: 0.9 K/UL
MONOCYTES NFR BLD: 12 %
NEUTROPHILS # BLD AUTO: 5.7 K/UL
NEUTROPHILS NFR BLD: 74 %
PHOSPHATE SERPL-MCNC: 3.3 MG/DL
PLATELET # BLD AUTO: 216 K/UL
PMV BLD AUTO: 10.1 FL
POTASSIUM SERPL-SCNC: 3.9 MMOL/L
PROT SERPL-MCNC: 6.8 G/DL
RBC # BLD AUTO: 3.62 M/UL
SODIUM SERPL-SCNC: 138 MMOL/L
WBC # BLD AUTO: 7.66 K/UL

## 2017-03-21 PROCEDURE — 97535 SELF CARE MNGMENT TRAINING: CPT

## 2017-03-21 PROCEDURE — 20600001 HC STEP DOWN PRIVATE ROOM

## 2017-03-21 PROCEDURE — 83735 ASSAY OF MAGNESIUM: CPT

## 2017-03-21 PROCEDURE — 84100 ASSAY OF PHOSPHORUS: CPT

## 2017-03-21 PROCEDURE — 85025 COMPLETE CBC W/AUTO DIFF WBC: CPT

## 2017-03-21 PROCEDURE — 97116 GAIT TRAINING THERAPY: CPT

## 2017-03-21 PROCEDURE — 36415 COLL VENOUS BLD VENIPUNCTURE: CPT

## 2017-03-21 PROCEDURE — 63600175 PHARM REV CODE 636 W HCPCS: Performed by: STUDENT IN AN ORGANIZED HEALTH CARE EDUCATION/TRAINING PROGRAM

## 2017-03-21 PROCEDURE — 99232 SBSQ HOSP IP/OBS MODERATE 35: CPT | Mod: ,,, | Performed by: HOSPITALIST

## 2017-03-21 PROCEDURE — 25000003 PHARM REV CODE 250: Performed by: PSYCHIATRY & NEUROLOGY

## 2017-03-21 PROCEDURE — 25000003 PHARM REV CODE 250: Performed by: ANESTHESIOLOGY

## 2017-03-21 PROCEDURE — 63600175 PHARM REV CODE 636 W HCPCS: Performed by: HOSPITALIST

## 2017-03-21 PROCEDURE — 92526 ORAL FUNCTION THERAPY: CPT

## 2017-03-21 PROCEDURE — 80053 COMPREHEN METABOLIC PANEL: CPT

## 2017-03-21 PROCEDURE — 97530 THERAPEUTIC ACTIVITIES: CPT

## 2017-03-21 RX ORDER — SODIUM CHLORIDE 0.9 % (FLUSH) 0.9 %
3 SYRINGE (ML) INJECTION EVERY 8 HOURS
Status: CANCELLED | OUTPATIENT
Start: 2017-03-21

## 2017-03-21 RX ORDER — HALOPERIDOL 5 MG/ML
2 INJECTION INTRAMUSCULAR ONCE
Status: DISCONTINUED | OUTPATIENT
Start: 2017-03-21 | End: 2017-03-21

## 2017-03-21 RX ORDER — RAMELTEON 8 MG/1
8 TABLET ORAL NIGHTLY PRN
Status: CANCELLED | OUTPATIENT
Start: 2017-03-21

## 2017-03-21 RX ORDER — ACETAMINOPHEN 325 MG/1
650 TABLET ORAL EVERY 6 HOURS PRN
Status: CANCELLED | OUTPATIENT
Start: 2017-03-21

## 2017-03-21 RX ORDER — METOPROLOL SUCCINATE 25 MG/1
25 TABLET, EXTENDED RELEASE ORAL DAILY
Status: CANCELLED | OUTPATIENT
Start: 2017-03-22

## 2017-03-21 RX ORDER — LATANOPROST 50 UG/ML
1 SOLUTION/ DROPS OPHTHALMIC NIGHTLY
Status: CANCELLED | OUTPATIENT
Start: 2017-03-21

## 2017-03-21 RX ORDER — QUETIAPINE FUMARATE 25 MG/1
25 TABLET, FILM COATED ORAL NIGHTLY
Status: CANCELLED | OUTPATIENT
Start: 2017-03-21

## 2017-03-21 RX ORDER — NAPROXEN SODIUM 220 MG/1
81 TABLET, FILM COATED ORAL DAILY
Refills: 0 | COMMUNITY
Start: 2017-03-21 | End: 2018-05-22

## 2017-03-21 RX ORDER — TIMOLOL MALEATE 5 MG/ML
1 SOLUTION/ DROPS OPHTHALMIC 2 TIMES DAILY
Status: CANCELLED | OUTPATIENT
Start: 2017-03-21

## 2017-03-21 RX ORDER — ENOXAPARIN SODIUM 100 MG/ML
40 INJECTION SUBCUTANEOUS EVERY 24 HOURS
Status: CANCELLED | OUTPATIENT
Start: 2017-03-21

## 2017-03-21 RX ORDER — METOPROLOL TARTRATE 1 MG/ML
5 INJECTION, SOLUTION INTRAVENOUS EVERY 5 MIN PRN
Status: CANCELLED | OUTPATIENT
Start: 2017-03-21

## 2017-03-21 RX ORDER — ONDANSETRON 2 MG/ML
4 INJECTION INTRAMUSCULAR; INTRAVENOUS EVERY 12 HOURS PRN
Status: CANCELLED | OUTPATIENT
Start: 2017-03-21

## 2017-03-21 RX ORDER — ESCITALOPRAM OXALATE 20 MG/1
20 TABLET ORAL DAILY
Status: CANCELLED | OUTPATIENT
Start: 2017-03-22

## 2017-03-21 RX ORDER — HALOPERIDOL 5 MG/ML
2 INJECTION INTRAMUSCULAR EVERY 6 HOURS PRN
Status: CANCELLED | OUTPATIENT
Start: 2017-03-21

## 2017-03-21 RX ADMIN — QUETIAPINE FUMARATE 25 MG: 25 TABLET, FILM COATED ORAL at 09:03

## 2017-03-21 RX ADMIN — ENOXAPARIN SODIUM 40 MG: 100 INJECTION SUBCUTANEOUS at 07:03

## 2017-03-21 RX ADMIN — TIMOLOL MALEATE 1 DROP: 5 SOLUTION OPHTHALMIC at 09:03

## 2017-03-21 RX ADMIN — RAMELTEON 8 MG: 8 TABLET, FILM COATED ORAL at 09:03

## 2017-03-21 RX ADMIN — Medication 3 ML: at 02:03

## 2017-03-21 RX ADMIN — LATANOPROST 1 DROP: 50 SOLUTION/ DROPS OPHTHALMIC at 09:03

## 2017-03-21 RX ADMIN — METOPROLOL SUCCINATE 25 MG: 25 TABLET, EXTENDED RELEASE ORAL at 09:03

## 2017-03-21 RX ADMIN — HALOPERIDOL LACTATE 2 MG: 5 INJECTION, SOLUTION INTRAMUSCULAR at 12:03

## 2017-03-21 RX ADMIN — Medication 3 ML: at 05:03

## 2017-03-21 RX ADMIN — Medication 10 ML: at 09:03

## 2017-03-21 RX ADMIN — ESCITALOPRAM 20 MG: 20 TABLET, FILM COATED ORAL at 09:03

## 2017-03-21 NOTE — PLAN OF CARE
Problem: Physical Therapy Goal  Goal: Physical Therapy Goal  PT goals for the next 10 visits    1. Pt supine to sit with minimal assist-not met  2. Pt sit to supine with CGA-not met  3. Pt sit to stand with RW with CGA-not met  4. Pt to perform gait 100ft with RW with minimal assist.-not met  5. Pt to transfer bed to/from bedside chair with RW with minimal assist.-MET 3/16  6. Pt to up/down 6 steps with B UE rails with minimal assist.-not met   7. Pt to perform B LE exs in sitting or supine x 20 reps with family assist.-not met   Outcome: Ongoing (interventions implemented as appropriate)  PT treatment completed. PT POC remains appropriate.     Alan Ocasio, SPT  3/21/2017

## 2017-03-21 NOTE — PLAN OF CARE
CM received voice message from CG, Francisco Javier, at pt bedside stating in message he had spoken with the spouse, Rebekah, in regards to pt discharge to home today with spouse rescinding wish to bring pt home today with HH and 24/7 sitters stating she doesn't have sitters in place for tonight and would request pt to be discharged home tomorrow once sitters and care in place.  CM will notify SW and IM5 of pt discharge to be tomorrow, 3/22, with HH and 24/7 sitters.  CM will continue to follow with SW to reach out to spouse for finalization.

## 2017-03-21 NOTE — PLAN OF CARE
Problem: SLP Goal  Goal: SLP Goal  Speech Language Pathology Goals  Goals expected to be met by 3/18  1. Pt will tolerate thin liquids without overt s/s aspiration. GOAL MET  2. Pt will tolerate trials of puree without overt s/s aspiration.GOAL MET  3. Pt will tolerate trials of solids without overt s/s aspiration.GOAL MET     Outcome: Outcome(s) achieved Date Met:  03/21/17  Pt was seen for ST session.      Elda Gallegos MS, CCC-SLP  Speech Language Pathologist  Pager: [phone]  3/21/2017

## 2017-03-21 NOTE — PLAN OF CARE
Problem: Patient Care Overview  Goal: Plan of Care Review  Outcome: Ongoing (interventions implemented as appropriate)  POC reviewed with pt and family; family able to understand/accept.  Pt's AAOx1 only to self.  VS stable.  Pt Confused throughout the night.  Agitated occasionally; prn meds given to help calm pt.  Called the doctor because pt had increased agitation; an additional dose of haldol was ordered.  But after repositioning and feeding the pt, he calmed down and did not need the haldol at that time.  Bed alarm set, camera monitoring, telemetry monitoring, bed in lowest position with wheels locked, call light in reach, side rails up x3, nonskid socks on.  Requested a sitter at the beginning of shift but the floor was unable to get a sitter for the pt.  Pt took off condom catheter; incontinence care provided.  Remains free from falls, skin breakdown, and injury.  Will continue to monitor.`

## 2017-03-21 NOTE — PLAN OF CARE
4:04 PM. ISABELLA placed call to pts wife Rebekah [phone] to discuss the final steps with discharge planning. Rebekah appears very flustered with arranging sitters noting that she has a senior resource guide. ISABELLA and pts wife navigated the sitters section over the phone resulting with Rebekah picking out two agencies she will begin calling today, [employer]. Rebekah notes they have a caregiver, Francisco Javier, but he is not 24/7, and that she would need over night and different hours throughout the day.     Rebekah reports pts daughter will be at the hospital at 10am tomorrow morning.   Rebekah concerned on how pt will get home and how he will get up the 4-5 steps in front of the house. ISABELLA calmed pts wife by noting that we have transportation agencies that could assist with those issues.   Rebekah also requesting for pt to be prescribed a medicine that will help calm him in the evening from his sun downs syndrome.    VIKKI Navas, ISABELLA  [phone]

## 2017-03-21 NOTE — PT/OT/SLP PROGRESS
Occupational Therapy  Treatment    Migue Fraser   MRN: [id number]   Admitting Diagnosis: Traumatic subarachnoid hematoma with loss of consciousness    OT Date of Treatment: 17   OT Start Time: 812  OT Stop Time: 842  OT Total Time (min): 30 min    Billable Minutes:  Self Care/Home Management 20 and Therapeutic Activity 10    General Precautions: Standard, fall, aspiration, seizure  Orthopedic Precautions: N/A  Braces: N/A         Subjective:  Communicated with RN prior to session.  Pt agreeable to participate with therapy.    Pain Ratin/10              Pain Rating Post-Intervention: 0/10    Objective:  Patient found with: telemetry     Functional Mobility:  Bed Mobility:  Supine to Sit: Maximum Assistance; tactile/verbal cues for task initiation  Sit to Supine:  (Pt remained sitting up in BS chair)    Transfers:   Sit <> Stand Assistance: Minimum Assistance (x1 trial from EOB); cues for proper hand placement  Sit <> Stand Assistive Device: Rolling Walker    Bed <> Chair Technique: Stand Pivot  Bed <> Chair Transfer Assistance: Minimum Assistance; cues for proper hand placement  Bed <> Chair Assistive Device: Rolling Walker    Functional Ambulation: ~8 steps from EOB>BS chair Min A using RW; assist for balance and for navigating environment    Activities of Daily Living:  UE Dressing Level of Assistance: Maximum assistance to jerrell gown like robe while seated EOB  LE Dressing Level of Assistance: Maximum assistance to jerrell B socks using 4 pt technique while seated EOB; cues for task initiation/sequencing  Grooming Position: Standing  Grooming Level of Assistance: Minimum assistance to wash face and brush teeth; assist for balance and task initiation/sequencing     Balance:   Static Sit: FAIR+: Able to take MINIMAL challenges from all directions  Dynamic Sit: FAIR+: Maintains balance through MINIMAL excursions of active trunk motion  Static Stand: POOR+: Needs MINIMAL assist to maintain  Dynamic stand:  POOR: Min A for functional mobility    Therapeutic Activities and Exercises:  Pt performed ~5-6 min dynamic standing while performing grooming with Min A for balance. Tactile/verbal cues to increase upright posture, upward gaze, and to maintain eyes open; good follow through at times.    AM-PAC 6 CLICK ADL   How much help from another person does this patient currently need?   1 = Unable, Total/Dependent Assistance  2 = A lot, Maximum/Moderate Assistance  3 = A little, Minimum/Contact Guard/Supervision  4 = None, Modified Centre/Independent    Putting on and taking off regular lower body clothing? : 2  Bathing (including washing, rinsing, drying)?: 2  Toileting, which includes using toilet, bedpan, or urinal? : 2  Putting on and taking off regular upper body clothing?: 2  Taking care of personal grooming such as brushing teeth?: 3  Eating meals?: 2  Total Score: 13     AM-PAC Raw Score CMS G-Code Modifier Level of Impairment Assistance   6 % Total / Unable   7 - 9 CM 80 - 100% Maximal Assist   10 - 14 CL 60 - 80% Moderate Assist   15 - 19 CK 40 - 60% Moderate Assist   20 - 22 CJ 20 - 40% Minimal Assist   23 CI 1-20% SBA / CGA   24 CH 0% Independent/ Mod I     Patient left up in chair with all lines intact, call button in reach and caregiver present    ASSESSMENT:  Migue Fraser is a 89 y.o. male with a medical diagnosis of Traumatic subarachnoid hematoma with loss of consciousness. He presents with good participation and motivation during session. Pt progressed this date by performing grooming in standing with Min A, UB dressing (donning gown like robe) with Max A, and LB dressing (donning socks) with Max A. Improvements noted with sustained attention during functional tasks. Pt continues to benefit from skilled OT to improve deficits noted below to increase (I) and safety with ADL performance.    Rehab identified problem list/impairments: Rehab identified problem list/impairments: weakness,  impaired endurance, impaired self care skills, impaired functional mobilty, impaired balance, impaired cognition, decreased coordination, decreased lower extremity function, decreased safety awareness, impaired fine motor    Rehab potential is good.    Activity tolerance: Good    Discharge recommendations: Discharge Facility/Level Of Care Needs: nursing facility, skilled (short stay)     Barriers to discharge: Barriers to Discharge: Inaccessible home environment, Decreased caregiver support    Equipment recommendations:  (TBD pending progress)     GOALS:   Occupational Therapy Goals        Problem: Occupational Therapy Goal    Goal Priority Disciplines Outcome Interventions   Occupational Therapy Goal     OT, PT/OT Ongoing (interventions implemented as appropriate)    Description:  Goals to be met by: 3/25     Patient will increase functional independence with ADLs by performing:    UE Dressing while seated EOB with Set-up Assistance and Minimal Assistance.  LE Dressing while seated EOB with Set-up Assistance and Contact Guard Assistance.  Grooming while seated at sink with Set-up Assistance and Minimal Assistance.  Toileting from bedside commode with Minimal Assistance for hygiene and clothing management.   Sitting at edge of bed x10 minutes for dynamic functional task with Contact Guard Assistance.  Supine to sit with Contact Guard Assistance.   Toilet transfer to bedside commode with Contact Guard Assistance.  CG demo understanding for positioning and bed mobility.                  Plan:  Patient to be seen 4 x/week to address the above listed problems via self-care/home management, therapeutic activities, therapeutic exercises, neuromuscular re-education, cognitive retraining  Plan of Care expires: 04/10/17  Plan of Care reviewed with: patient, caregiver         SILVANA Luevano/LAUREN  03/21/2017

## 2017-03-21 NOTE — PLAN OF CARE
Problem: Occupational Therapy Goal  Goal: Occupational Therapy Goal  Goals to be met by: 3/25     Patient will increase functional independence with ADLs by performing:    UE Dressing while seated EOB with Set-up Assistance and Minimal Assistance.  LE Dressing while seated EOB with Set-up Assistance and Contact Guard Assistance.  Grooming while seated at sink with Set-up Assistance and Minimal Assistance.  Toileting from bedside commode with Minimal Assistance for hygiene and clothing management.   Sitting at edge of bed x10 minutes for dynamic functional task with Contact Guard Assistance.  Supine to sit with Contact Guard Assistance.   Toilet transfer to bedside commode with Contact Guard Assistance.  CG demo understanding for positioning and bed mobility.    Outcome: Ongoing (interventions implemented as appropriate)  Pt met no goals this date. Continue OT POC.     SILVANA Luevano/LAUREN  3/21/2017

## 2017-03-21 NOTE — PT/OT/SLP PROGRESS
Physical Therapy  Treatment    Migue Fraser   MRN: [id number]   Admitting Diagnosis: Traumatic subarachnoid hematoma with loss of consciousness    PT Received On: 03/21/17  PT Start Time: 1018     PT Stop Time: 1037    PT Total Time (min): 19 min       Billable Minutes:  Gait Training 13 and Neuromuscular Re-education 6    Treatment Type: Treatment  PT/PTA: PT     PTA Visit Number: 1       General Precautions: Standard, fall  Orthopedic Precautions: N/A   Braces:      Do you have any cultural, spiritual, Anglican conflicts, given your current situation?: none noted    Subjective:  Communicated with nsg prior to session.      Pain Rating:  (no numerical value given)             pt was agreeable to therapy       Objective:   Patient found with:  (pt found up in chair)    Functional Mobility:  Bed Mobility:        Transfers:  Sit <> Stand Assistance: Minimum Assistance, Moderate Assistance (x2 trials both from bedside chair; second trial improved req only minimum assisstance)  Sit <> Stand Assistive Device: Rolling Walker    Gait:   Gait Distance: 40 ft; pt req verbal cues for avoiding obstacles and upright posture.  Assistance 1: Minimum assistance  Gait Assistive Device: Rolling walker  Gait Pattern: 3-point gait  Gait Deviation(s): decreased viral, increased time in double stance, decreased velocity of limb motion, decreased step length, decreased stride length, forward lean (pt req verbal cues to correct forward head posture and to avoid obstacles)      Balance:   Static Sit: FAIR+: Able to take MINIMAL challenges from all directions  Dynamic Sit: FAIR+: Maintains balance through MINIMAL excursions of active trunk motion  Static Stand: FAIR+: Takes MINIMAL challenges from all directions  Dynamic stand: POOR: Requires Contact Guard Assist for gait     Therapeutic Activities and Exercises:  Pt completed ~3 minutes of reaching activities with goal of bearing weight through L LE.   Pt completed x15 reps of  seated marches, LAQ, and ankle pumps.     EDUCATION  Pt educated pt on incr OOB activity including sitting in bedside chair majority of day.  Educated pt on being appropriate to transfer with nsg and PCT  Updated white board with appropriate PT information; notified nsg   Pt verbalized agreement.       AM-PAC 6 CLICK MOBILITY  How much help from another person does this patient currently need?   1 = Unable, Total/Dependent Assistance  2 = A lot, Maximum/Moderate Assistance  3 = A little, Minimum/Contact Guard/Supervision  4 = None, Modified [place]/Independent    Turning over in bed (including adjusting bedclothes, sheets and blankets)?: 3  Sitting down on and standing up from a chair with arms (e.g., wheelchair, bedside commode, etc.): 2  Moving from lying on back to sitting on the side of the bed?: 2  Moving to and from a bed to a chair (including a wheelchair)?: 2  Need to walk in hospital room?: 2  Climbing 3-5 steps with a railing?: 1  Total Score: 12    AM-PAC Raw Score CMS G-Code Modifier Level of Impairment Assistance   6 % Total / Unable   7 - 9 CM 80 - 100% Maximal Assist   10 - 14 CL 60 - 80% Moderate Assist   15 - 19 CK 40 - 60% Moderate Assist   20 - 22 CJ 20 - 40% Minimal Assist   23 CI 1-20% SBA / CGA   24 CH 0% Independent/ Mod I     Patient left up in chair with call button in reach and nsg notified.    Assessment:  Migue Fraser is a 89 y.o. male with a medical diagnosis of Traumatic subarachnoid hematoma with loss of consciousness and presents with cognitive deficits and weakness limiting his ability to perform functional mobility. Pt was able to complete exercises req inc time, verbal, and tactile cues to achieve success. Exercises were to improve balance and pt was able to complete with increased assistance and verbal cueing. Pt req assistance to avoid obstacles in the sprague; forward lean and kyphotic posture req inc verbal cues to correct. Pt would benefit from continued  skilled PT to address weakness and balance deficits. Pt is good candidate for skilled nursing facility to return to PLOF and increase independence.     Rehab identified problem list/impairments: Rehab identified problem list/impairments: weakness, impaired endurance, impaired self care skills, impaired balance, gait instability, decreased coordination, impaired functional mobilty, impaired cognition, decreased safety awareness, decreased upper extremity function, decreased lower extremity function, pain    Rehab potential is good.    Activity tolerance: Good    Discharge recommendations: Discharge Facility/Level Of Care Needs: nursing facility, skilled     Barriers to discharge:      Equipment recommendations: Equipment Needed After Discharge:  (TBD at next level of care)     GOALS:   Physical Therapy Goals        Problem: Physical Therapy Goal    Goal Priority Disciplines Outcome Goal Variances Interventions   Physical Therapy Goal     PT/OT, PT Ongoing (interventions implemented as appropriate)     Description:  PT goals for the next 10 visits    1. Pt supine to sit with minimal assist-not met  2. Pt sit to supine with CGA-not met  3. Pt sit to stand with RW with CGA-not met  4. Pt to perform gait 100ft with RW with minimal assist.-not met  5. Pt to transfer bed to/from bedside chair with RW with minimal assist.-MET 3/16  6. Pt to up/down 6 steps with B UE rails with minimal assist.-not met   7. Pt to perform B LE exs in sitting or supine x 20 reps with family assist.-not met                 PLAN:    Patient to be seen 5 x/week  to address the above listed problems via gait training, therapeutic activities, therapeutic exercises, neuromuscular re-education  Plan of Care expires: 04/20/17  Plan of Care reviewed with: patient, caregiver         Alan Ocasio, JENNIE  03/21/2017

## 2017-03-21 NOTE — PLAN OF CARE
[facility]    HOME HEALTH ORDERS  FACE TO FACE ENCOUNTER    Patient Name: Migue Fraser  YOB: 1925    PCP: Iggy Telles MD   PCP Address: [address] / [place] [address]  PCP Phone Number: [phone]  PCP Fax: [phone]    Encounter Date: 03/21/2017    Admit to Home Health    Diagnoses:  Active Hospital Problems    Diagnosis  POA    *Traumatic subarachnoid hematoma with loss of consciousness [S06.6X9A]  Yes    Debility [R53.81]  Yes    SAH (subarachnoid hemorrhage) [I60.9]  Yes    Agitation requiring sedation protocol [R45.1]  Yes    Contusion of left side of brain without loss of consciousness [S06.320A]  Yes    Cerebral contusion [S06.339A]  Yes    Abnormal coagulation profile [R79.1]  Yes    Current use of long term anticoagulation [Z79.01]  Not Applicable    Atrial fibrillation [I48.91]  Yes    Sick sinus syndrome [I49.5]  Yes      Resolved Hospital Problems    Diagnosis Date Resolved POA    Hypophosphatemia [E83.39] 03/19/2017 Yes    Hypokalemia [E87.6] 03/18/2017 Yes    SAH (subarachnoid hemorrhage) [I60.9] 03/11/2017 Yes       Future Appointments  Date Time Provider Department Center   3/24/2017 8:00 AM Iggy Telles MD [facility]   3/24/2017 10:30 AM NURSE, [facility] INTERNAL MEDICINE [facility] Bret [facility]   3/29/2017 12:40 PM [facility] CT6 MOBILE LIMIT 450 LBS [facility] CT SCAN [facility]   3/29/2017 1:40 PM YAW Daigle [facility] NEUROS[address] [place] [address]     Follow-up Information     Go to Iggy Telles MD.    Specialty:  Family Medicine    Why:  Hospital follow up at 8 am     Contact information:    [address]  [place] [address]  [phone]              I have seen and examined this patient face to face today. My clinical findings that support the need for the home health skilled services and home bound status are the following:  Weakness/numbness causing balance and gait disturbance due to Weakness/Debility making it  taxing to leave home.  Requiring assistive device to leave home due to unsteady gait caused by  Weakness/Debility.  Patient with medication mismanagement issues requiring home bound status as evidenced by  Poor understanding of medication regimen/dosage and Poor adherence to medication regimen/dosage.  Medical restrictions requiring assistance of another human to leave home due to  Unstable ambulation.  Mental confusion making it unsafe for patient to leave home alone due to  Dementia.    Allergies:  Review of patient's allergies indicates:   Allergen Reactions    Penicillins        Diet: regular diet    Activities: ambulate in house with assistance    Nursing:   SN to complete comprehensive assessment including routine vital signs. Instruct on disease process and s/s of complications to report to MD. Review/verify medication list sent home with the patient at time of discharge  and instruct patient/caregiver as needed. Frequency may be adjusted depending on start of care date.    Notify MD if SBP > 160 or < 90; DBP > 90 or < 50; HR > 120 or < 50; Temp > 101;       CONSULTS:    Physical Therapy to evaluate and treat. Evaluate for home safety and equipment needs; Establish/upgrade home exercise program. Perform / instruct on therapeutic exercises, gait training, transfer training, and Range of Motion.  Occupational Therapy to evaluate and treat. Evaluate home environment for safety and equipment needs. Perform/Instruct on transfers, ADL training, ROM, and therapeutic exercises.   to evaluate for community resources/long-range planning.  Aide to provide assistance with personal care, ADLs, and vital signs.    MISCELLANEOUS CARE:  n/a    WOUND CARE ORDERS  n/a      Medications: Review discharge medications with patient and family and provide education.      Current Discharge Medication List      START taking these medications    Details   aspirin 81 MG Chew Take 1 tablet (81 mg total) by mouth once daily.  Start taking this on the 3/27  Refills: 0         CONTINUE these medications which have NOT CHANGED    Details   escitalopram oxalate (LEXAPRO) 20 MG tablet Take 1 tablet (20 mg total) by mouth once daily.  Qty: 30 tablet, Refills: 11    Associated Diagnoses: Vascular dementia with behavior disturbance      latanoprost 0.005 % ophthalmic solution Place 1 drop into both eyes every evening.       metoprolol succinate (TOPROL-XL) 25 MG 24 hr tablet Take 1 tablet (25 mg total) by mouth once daily.  Qty: 90 tablet, Refills: 3    Associated Diagnoses: Atrial fibrillation, unspecified type      timolol maleate 0.5% (TIMOPTIC) 0.5 % Drop Place 1 drop into both eyes 2 (two) times daily.  Refills: 1      tramadol (ULTRAM) 50 mg tablet Take 50 mg by mouth every 6 (six) hours as needed for Pain.   Refills: 0         STOP taking these medications       apixaban 5 mg Tab Comments:   Reason for Stopping:               I certify that this patient is confined to his home and needs intermittent skilled nursing care, physical therapy and occupational therapy.

## 2017-03-21 NOTE — SUBJECTIVE & OBJECTIVE
Interval History: Patient had some confusion overnight and was occasionally agitated. He was given prn haldol. His agitation continued and an additional dose of haldol was ordered. However, prior to administration his agitation resolved. He had no other acute overnight events. He was resting comfortably this morning. He is at baseline mental status. Kidney function is within normal limits. He is pending SNF placement.     Review of Systems   Unable to perform ROS: Dementia     Objective:     Vital Signs (Most Recent):  Temp: 97.5 °F (36.4 °C) (03/21/17 0800)  Pulse: 76 (03/21/17 0900)  Resp: 16 (03/21/17 0800)  BP: (!) 112/58 (03/21/17 0800)  SpO2: 95 % (03/21/17 0800) Vital Signs (24h Range):  Temp:  [97.4 °F (36.3 °C)-97.7 °F (36.5 °C)] 97.5 °F (36.4 °C)  Pulse:  [] 76  Resp:  [14-16] 16  SpO2:  [95 %-96 %] 95 %  BP: (112-138)/(58-89) 112/58     Weight: 76 kg (167 lb 8.8 oz)  Body mass index is 24.74 kg/(m^2).    Intake/Output Summary (Last 24 hours) at 03/21/17 1109  Last data filed at 03/20/17 2146   Gross per 24 hour   Intake              363 ml   Output              350 ml   Net               13 ml      Physical Exam   Constitutional: No distress.   HENT:   Bruises on left side of the forehead.    Eyes: No scleral icterus.   Neck: No JVD present.   Cardiovascular: Normal rate.  An irregularly irregular rhythm present.   No murmur heard.  Pulmonary/Chest: Effort normal and breath sounds normal. No respiratory distress. He has no wheezes.   Abdominal: Soft. He exhibits no distension. There is no tenderness.   Musculoskeletal: He exhibits no edema or tenderness.   Neurological: He is alert.   Oriented to self and person.    Skin: Skin is warm. He is not diaphoretic.   Bruises on left shoulder        Significant Labs: All pertinent labs within the past 24 hours have been reviewed.    Significant Imaging: I have reviewed all pertinent imaging results/findings within the past 24 hours.

## 2017-03-21 NOTE — PLAN OF CARE
03/21/17 1220   Final Note   Assessment Type Final Discharge Note   Discharge Disposition Home-Health   Discharge planning education complete? Yes   Hospital Follow Up  Appt(s) scheduled? Yes   Discharge plans and expectations educations in teach back method with documentation complete? Yes   [email or web address]? n/a   Discharge/Hospital Encounter Summary to ([facility]) PCP n/a   Referral to Outpatient Case Management complete? Yes   Referral to / orders for Home Health Complete? Yes   30 day supply of medicines given at discharge, if documented non-compliance / non-adherence? n/a   Any social issues identified prior to discharge? No   Did you assess the readiness or willingness of the family or caregiver to support self management of care? Yes     Future Appointments  Date Time Provider Department Center   3/24/2017 8:00 AM Iggy Telles MD [facility] Bret [facility] PCW   3/24/2017 10:30 AM NURSE, [facility] INTERNAL MEDICINE [facility] Bret Ghotra PCW   3/29/2017 12:40 PM [facility] CT6 MOBILE LIMIT 450 LBS [facility] CT SCAN Bret G[facility]ra   3/29/2017 1:40 PM YAW Daigle [facility] NEUROS7 Bret Ghotra     Pt will go home to spouse with  and 24/7 sitters

## 2017-03-21 NOTE — PLAN OF CARE
CM received call from pt spouse, Rebekah, requesting to have pt sent home with HH.  States she will provide 24/7 sitters for his care and her help.  CM informed IM5 team of pt spouse wishes with IM at bedside seeing pt and CG, Francisco Javier.  LISET requested IM put in HH orders pt was with [facility] in past and to order PT/OT/SW/SN along with OP Case Mgt for resources to possible placement in future.  SW to follow with spouse.

## 2017-03-21 NOTE — PROGRESS NOTES
[facility] Hospital Medicine  Progress Note    Patient Name: Migue Fraser  MRN: [id number]  Patient Class: IP- Inpatient   Admission Date: 3/10/2017  Length of Stay: 10 days  Attending Physician: Alex Delgado MD  Primary Care Provider: Iggy Telles MD    [facility] Hospital Medicine Team: [facility] HOSP MED 5 Hal Goetz MD    Subjective:     Principal Problem:Traumatic subarachnoid hematoma with loss of consciousness    HPI:  89 yo M w PMHx of SSS and A-fib (on Elaquis), prostate cancer, and dementia who was experienced an unwitnessed fall at home. Pt was found down in his back yard by his son. CT scan demonstrated multiple parenchymal hemorrhages with superimposed SAH. Pt has dementia at baseline but according to son is markedly more confused than usual.     Hospital Course:  On admit, patient was administered PCC; and neurosurgery was consulted. Repeat CT scans showed stable small intracranial sub arachnoid hemorrhages. No acute NSGY was performed. Hospital course remarkable for agitation; requiring intermittent precedex ggt which was discontinued and transitioned to seroquel BID with PRN haldol. Home med metoprolol restarted for a fib. Stepped down to the floor in 3/17/2017. Repeat CT scan ruled out any further progressions. Revaluated by NSGY 2/2 an acute change in his mental status. However, was found to be in his baseline waxing and waning mental status. Seen by SLP 2/2 concerns for aspiration risk. He was cleared from swallowing perspective as long as feeding occurs while awake and not sleepy. Plan is to discontinue his home anticoagulation 2/2 bleeding risk and continue aspirin. He is currently stable and pending SNF placement.     Interval History: Patient had some confusion overnight and was occasionally agitated. He was given prn haldol. His agitation continued and an additional dose of haldol was ordered. However, prior to administration his agitation resolved. He had no other  acute overnight events. He was resting comfortably this morning. He is at baseline mental status. Kidney function is within normal limits. He is pending SNF placement.     Review of Systems   Unable to perform ROS: Dementia     Objective:     Vital Signs (Most Recent):  Temp: 97.5 °F (36.4 °C) (03/21/17 0800)  Pulse: 76 (03/21/17 0900)  Resp: 16 (03/21/17 0800)  BP: (!) 112/58 (03/21/17 0800)  SpO2: 95 % (03/21/17 0800) Vital Signs (24h Range):  Temp:  [97.4 °F (36.3 °C)-97.7 °F (36.5 °C)] 97.5 °F (36.4 °C)  Pulse:  [] 76  Resp:  [14-16] 16  SpO2:  [95 %-96 %] 95 %  BP: (112-138)/(58-89) 112/58     Weight: 76 kg (167 lb 8.8 oz)  Body mass index is 24.74 kg/(m^2).    Intake/Output Summary (Last 24 hours) at 03/21/17 1109  Last data filed at 03/20/17 2146   Gross per 24 hour   Intake              363 ml   Output              350 ml   Net               13 ml      Physical Exam   Constitutional: No distress.   HENT:   Bruises on left side of the forehead.    Eyes: No scleral icterus.   Neck: No JVD present.   Cardiovascular: Normal rate.  An irregularly irregular rhythm present.   No murmur heard.  Pulmonary/Chest: Effort normal and breath sounds normal. No respiratory distress. He has no wheezes.   Abdominal: Soft. He exhibits no distension. There is no tenderness.   Musculoskeletal: He exhibits no edema or tenderness.   Neurological: He is alert.   Oriented to self and person.    Skin: Skin is warm. He is not diaphoretic.   Bruises on left shoulder        Significant Labs: All pertinent labs within the past 24 hours have been reviewed.    Significant Imaging: I have reviewed all pertinent imaging results/findings within the past 24 hours.    Assessment/Plan:      Atrial fibrillation  -- was on eliquis at home and was held during admission due to subarachnoid hemorrhage and given kecentra.  -- This morning his HR is normal with normal blood pressure. On toprol-xl 25 mg daily. Will consider increasing toprol 25 mg  if there is persistent tachycardia.   -- due to his risk of fall, avoiding anticoagulation is advised. Plan is to restart aspirin on 3/27/17 to provide some coverage given his high chadsvasc/has bled scores.           Agitation requiring sedation protocol  -- needed sedation in the ICU  -- currently not agitated this morning.   -- on Seroquel regular and haldol prn           SAH (subarachnoid hemorrhage)  -- seen by neurosurgery and no intervention was done.  -- repeated CT scan was stable.  -- stable           Debility  -- PT/O  -- P[facility]           Hypophosphatemia  -- resolved      VTE Risk Mitigation         Ordered     enoxaparin injection 40 mg  Daily     Route:  Subcutaneous        03/19/17 0705        Dipso: Pending placement to [facility]    Hal Goetz MD  Department of Hospital Medicine   [facility]

## 2017-03-21 NOTE — PT/OT/SLP PROGRESS
Speech Language Pathology  Treatment  Discharge Summary    Migue Fraser   MRN: [id number]   Admitting Diagnosis: Traumatic subarachnoid hematoma with loss of consciousness    Diet recommendations: Solid Diet Level: Regular  Liquid Diet Level: Thin Feed only when awake/alert, HOB to 90 degrees, Small bites/sips, Alternating bites/sips, 1 bite/sip at a time and Avoid talking while eating   Check oral cavity after oral meds to ensure clearance, bites of puree can be given after pills to help with clearance    SLP Treatment Date: 17  Speech Start Time: 1133     Speech Stop Time: 1145     Speech Total (min): 12 min       TREATMENT BILLABLE MINUTES:  Treatment Swallowing Dysfunction 12    Has the patient been evaluated by SLP for swallowing? : Yes  Keep patient NPO?: No   General Precautions: Standard, aspiration, fall, [place]  Current Respiratory Status: nasal cannula       Subjective:  Easily awaked with verbal stim. Caregiver at bedside. NSG pt tolerating current diet & meds with no difficulty. Pt seated up in chair.     Pain Ratin/10              Pain Rating Post-Intervention: 0/10    Objective:    Pt tolerated 1 cup of thin liquid via cup sips with no s/s aspiration. Pt tolerated regular solid trial with no s/s aspiration & adequate oral phase of swallow. All recs & standard swallow precautions reviewed with pt & caregiver, caregiver verbalized understanding.     FIM:                                 Assessment:  Migue Fraser is a 89 y.o. male with a medical diagnosis of Traumatic subarachnoid hematoma with loss of consciousness and presents with oropharyngeal swallow appears WFL, no further ST service warranted at this time for swallowing    Discharge recommendations: Discharge Facility/Level Of Care Needs: nursing facility, skilled     Goals:   SLP Goals     Not on file      Multidisciplinary Problems (Resolved)        Problem: SLP Goal    Goal Priority Disciplines Outcome   SLP Goal    (Resolved)     SLP Outcome(s) achieved   Description:  Speech Language Pathology Goals  Goals expected to be met by 3/18  1.  Pt will tolerate thin liquids without overt s/s aspiration. GOAL MET  2. Pt will tolerate trials of puree without overt s/s aspiration.GOAL MET  3. Pt will tolerate trials of solids without overt s/s aspiration.GOAL MET                      Plan:   Patient to be seen Therapy Frequency: 5 x/week   Plan of Care expires: 04/16/17  Plan of Care reviewed with: patient, caregiver  SLP Follow-up?: No  SLP - Next Visit Date: 03/13/17           Elda Gallegos CCC-SLP  03/21/2017   [phone]

## 2017-03-22 ENCOUNTER — OUTPATIENT CASE MANAGEMENT (OUTPATIENT)
Dept: ADMINISTRATIVE | Facility: OTHER | Age: 82
End: 2017-03-22

## 2017-03-22 VITALS
HEIGHT: 69 IN | RESPIRATION RATE: 16 BRPM | BODY MASS INDEX: 24.82 KG/M2 | DIASTOLIC BLOOD PRESSURE: 63 MMHG | TEMPERATURE: 98 F | HEART RATE: 67 BPM | WEIGHT: 167.56 LBS | OXYGEN SATURATION: 95 % | SYSTOLIC BLOOD PRESSURE: 121 MMHG

## 2017-03-22 LAB
ALBUMIN SERPL BCP-MCNC: 2.9 G/DL
ALP SERPL-CCNC: 110 U/L
ALT SERPL W/O P-5'-P-CCNC: 23 U/L
ANION GAP SERPL CALC-SCNC: 13 MMOL/L
AST SERPL-CCNC: 23 U/L
BASOPHILS # BLD AUTO: 0.03 K/UL
BASOPHILS NFR BLD: 0.5 %
BILIRUB SERPL-MCNC: 0.8 MG/DL
BUN SERPL-MCNC: 25 MG/DL
CALCIUM SERPL-MCNC: 9.4 MG/DL
CHLORIDE SERPL-SCNC: 98 MMOL/L
CO2 SERPL-SCNC: 29 MMOL/L
CREAT SERPL-MCNC: 0.9 MG/DL
DIFFERENTIAL METHOD: ABNORMAL
EOSINOPHIL # BLD AUTO: 0.1 K/UL
EOSINOPHIL NFR BLD: 1 %
ERYTHROCYTE [DISTWIDTH] IN BLOOD BY AUTOMATED COUNT: 12.8 %
EST. GFR  (AFRICAN AMERICAN): >60 ML/MIN/1.73 M^2
EST. GFR  (NON AFRICAN AMERICAN): >60 ML/MIN/1.73 M^2
GLUCOSE SERPL-MCNC: 121 MG/DL
HCT VFR BLD AUTO: 37.5 %
HGB BLD-MCNC: 13.1 G/DL
LYMPHOCYTES # BLD AUTO: 1.2 K/UL
LYMPHOCYTES NFR BLD: 19.5 %
MAGNESIUM SERPL-MCNC: 2.2 MG/DL
MCH RBC QN AUTO: 35.5 PG
MCHC RBC AUTO-ENTMCNC: 34.9 %
MCV RBC AUTO: 102 FL
MONOCYTES # BLD AUTO: 0.7 K/UL
MONOCYTES NFR BLD: 11.7 %
NEUTROPHILS # BLD AUTO: 4.1 K/UL
NEUTROPHILS NFR BLD: 66.8 %
PHOSPHATE SERPL-MCNC: 3.8 MG/DL
PLATELET # BLD AUTO: 252 K/UL
PMV BLD AUTO: 10.1 FL
POTASSIUM SERPL-SCNC: 3.8 MMOL/L
PROT SERPL-MCNC: 6.9 G/DL
RBC # BLD AUTO: 3.69 M/UL
SODIUM SERPL-SCNC: 140 MMOL/L
WBC # BLD AUTO: 6.16 K/UL

## 2017-03-22 PROCEDURE — 97535 SELF CARE MNGMENT TRAINING: CPT

## 2017-03-22 PROCEDURE — 25000003 PHARM REV CODE 250: Performed by: ANESTHESIOLOGY

## 2017-03-22 PROCEDURE — 85025 COMPLETE CBC W/AUTO DIFF WBC: CPT

## 2017-03-22 PROCEDURE — 63600175 PHARM REV CODE 636 W HCPCS: Performed by: HOSPITALIST

## 2017-03-22 PROCEDURE — 97110 THERAPEUTIC EXERCISES: CPT

## 2017-03-22 PROCEDURE — 83735 ASSAY OF MAGNESIUM: CPT

## 2017-03-22 PROCEDURE — 84100 ASSAY OF PHOSPHORUS: CPT

## 2017-03-22 PROCEDURE — 97116 GAIT TRAINING THERAPY: CPT

## 2017-03-22 PROCEDURE — 36415 COLL VENOUS BLD VENIPUNCTURE: CPT

## 2017-03-22 PROCEDURE — 25000003 PHARM REV CODE 250: Performed by: PSYCHIATRY & NEUROLOGY

## 2017-03-22 PROCEDURE — 97530 THERAPEUTIC ACTIVITIES: CPT

## 2017-03-22 PROCEDURE — 80053 COMPREHEN METABOLIC PANEL: CPT

## 2017-03-22 RX ORDER — TRAZODONE HYDROCHLORIDE 50 MG/1
50 TABLET ORAL NIGHTLY PRN
Qty: 30 TABLET | Refills: 0 | Status: SHIPPED | OUTPATIENT
Start: 2017-03-22 | End: 2017-04-04 | Stop reason: SDUPTHER

## 2017-03-22 RX ORDER — RAMELTEON 8 MG/1
8 TABLET ORAL NIGHTLY PRN
Status: DISCONTINUED | OUTPATIENT
Start: 2017-03-22 | End: 2017-03-22 | Stop reason: HOSPADM

## 2017-03-22 RX ADMIN — ESCITALOPRAM 20 MG: 20 TABLET, FILM COATED ORAL at 08:03

## 2017-03-22 RX ADMIN — METOPROLOL SUCCINATE 25 MG: 25 TABLET, EXTENDED RELEASE ORAL at 08:03

## 2017-03-22 RX ADMIN — HALOPERIDOL LACTATE 2 MG: 5 INJECTION, SOLUTION INTRAMUSCULAR at 01:03

## 2017-03-22 RX ADMIN — Medication 10 ML: at 06:03

## 2017-03-22 RX ADMIN — TIMOLOL MALEATE 1 DROP: 5 SOLUTION OPHTHALMIC at 08:03

## 2017-03-22 NOTE — PT/OT/SLP PROGRESS
Occupational Therapy  Treatment    Migue Fraser   MRN: [id number]   Admitting Diagnosis: Traumatic subarachnoid hematoma with loss of consciousness    OT Date of Treatment: 17   OT Start Time: 748  OT Stop Time: 812  OT Total Time (min): 24 min    Billable Minutes:  Self Care/Home Management 16 and Therapeutic Activity 8    General Precautions: Standard, fall  Orthopedic Precautions: N/A  Braces: N/A         Subjective:  Communicated with RN (Esther) prior to session. Pt asleep upon OT arrival, able to arouse. Pt agreeable to participate with therapy.    Pain Ratin/10              Pain Rating Post-Intervention: 0/10    Objective:  Patient found with: telemetry     Functional Mobility:  Bed Mobility:  Rolling/Turning Right: Minimum assistance  Scooting/Bridging: Moderate Assistance (to scoot toward EOB)  Supine to Sit: Total Assistance with tactile/verbal cues for task initiation  Sit to Supine:  Pt left sitting up in BS chair    Transfers:   Sit <> Stand Assistance: Minimum Assistance x1 trial from EOB  Sit <> Stand Assistive Device: No Assistive Device    Bed <> Chair Technique: Stand Pivot  Bed <> Chair Transfer Assistance: Minimum Assistance with cues for proper hand placement  Bed <> Chair Assistive Device: Rolling Walker    Functional Ambulation: ~8 steps from EOB>BS chair Min A using RW; assist for balance and for RW management    Activities of Daily Living:  UE Dressing Level of Assistance: Maximum assistance to doff/jerrell front gown and to doff/jerrell gown like robe while seated EOB  LE Dressing Level of Assistance: Moderate assistance to jerrell B socks using 4 pt technique while seated EOB; pt able to follow min-mod verbal/tactile cues to utilize 4 pt technique  Grooming Position: Standing  Grooming Level of Assistance: Minimum assistance to brush teeth; assist for balance    Balance:   Static Sit: GOOD-: Takes MODERATE challenges from all directions but inconsistently  Dynamic Sit: GOOD-:  Maintains balance through MODERATE excursions of active trunk movement,     Static Stand: FAIR: Maintains without assist but unable to take challenges  Dynamic stand: POOR: Min A for functional mobility    Therapeutic Activities and Exercises:  Pt performed ~6 min dynamic standing while performing grooming with Min A for balance. Tactile/verbal cues to increase upright posture, upward gaze demonstrating good follow.     AM-PAC 6 CLICK ADL   How much help from another person does this patient currently need?   1 = Unable, Total/Dependent Assistance  2 = A lot, Maximum/Moderate Assistance  3 = A little, Minimum/Contact Guard/Supervision  4 = None, Modified [place]/Independent    Putting on and taking off regular lower body clothing? : 2  Bathing (including washing, rinsing, drying)?: 2  Toileting, which includes using toilet, bedpan, or urinal? : 2  Putting on and taking off regular upper body clothing?: 2  Taking care of personal grooming such as brushing teeth?: 3  Eating meals?: 2  Total Score: 13     AM-PAC Raw Score CMS G-Code Modifier Level of Impairment Assistance   6 % Total / Unable   7 - 9 CM 80 - 100% Maximal Assist   10 - 14 CL 60 - 80% Moderate Assist   15 - 19 CK 40 - 60% Moderate Assist   20 - 22 CJ 20 - 40% Minimal Assist   23 CI 1-20% SBA / CGA   24 CH 0% Independent/ Mod I     Patient left up in chair with all lines intact, call button in reach and RN notified    ASSESSMENT:  Migue Fraser is a 89 y.o. male with a medical diagnosis of Traumatic subarachnoid hematoma with loss of consciousness. He presents with good participation and motivation during session. Pt progressed this date by performing LB dressing (donning socks) with Mod A. Improvements noted with sustained attention and command following. Decreased cueing for task sequencing needed when brushing teeth. Pt continues to benefit from skilled OT to improve deficits noted below to increase (I) and safety with ADL  performance.    Rehab identified problem list/impairments: Rehab identified problem list/impairments: weakness, impaired endurance, impaired self care skills, impaired functional mobilty, impaired balance, impaired cognition, decreased coordination, decreased upper extremity function, decreased lower extremity function, decreased safety awareness, impaired fine motor    Rehab potential is good.    Activity tolerance: Good    Discharge recommendations: Discharge Facility/Level Of Care Needs: nursing facility, skilled     Barriers to discharge: Barriers to Discharge: Inaccessible home environment, Decreased caregiver support    Equipment recommendations: bath bench, grab bar, bedside commode, raised toilet     GOALS:   Occupational Therapy Goals        Problem: Occupational Therapy Goal    Goal Priority Disciplines Outcome Interventions   Occupational Therapy Goal     OT, PT/OT Ongoing (interventions implemented as appropriate)    Description:  Goals to be met by: 3/25     Patient will increase functional independence with ADLs by performing:    UE Dressing while seated EOB with Set-up Assistance and Minimal Assistance.  LE Dressing while seated EOB with Set-up Assistance and Contact Guard Assistance.  Grooming while seated at sink with Set-up Assistance and Minimal Assistance.  Toileting from bedside commode with Minimal Assistance for hygiene and clothing management.   Sitting at edge of bed x10 minutes for dynamic functional task with Contact Guard Assistance.  Supine to sit with Contact Guard Assistance.   Toilet transfer to bedside commode with Contact Guard Assistance.  CG demo understanding for positioning and bed mobility.                  Plan:  Patient to be seen 4 x/week to address the above listed problems via self-care/home management, therapeutic activities, therapeutic exercises, neuromuscular re-education, cognitive retraining  Plan of Care expires: 04/10/17  Plan of Care reviewed with: patient          Jazzmine Hammer, OTR/L  03/22/2017

## 2017-03-22 NOTE — PLAN OF CARE
Problem: Patient Care Overview  Goal: Plan of Care Review  Outcome: Ongoing (interventions implemented as appropriate)  No acute changes or issues overnight. POC including fall prevention measures reviewed with pt and son at bedside. Pt's AAOx1 only to self.  Confused, attempting multiple times to exit bed throughout the night; prn meds given to help calm pt and encourage appropriate night/day regimen. Bed alarm set, pt in designated camera room , telemetry monitoring (afib rate controlled), bed in lowest position with wheels locked, call light in reach, side rails up x3, nonskid socks on.  Remains free from falls, skin breakdown, and injury overnight. Incontinent, voiding without issue and tolerating po intake. VSS. Will continue to monitor.

## 2017-03-22 NOTE — PLAN OF CARE
9:05 AM. ISABELLA placed call to pts room to see if his PCG, Francisco Javier was currently at the hospital, he is not present quite yet. ISABELLA placed call to pts wife Rebekah [phone] to check on the status of final sitters arrangements, there was no answer. ISABELLA plans to see pt daughter in pts room at 10 and arrange transportation home from there.      10:33 AM. ISABELLA spoke with pts daughter Sandra and pts PCG Francisco Javier in pts room. Informed that pts wife has contacted [facility] agency. ISABELLA informed family that transportation would be set up with [employer] [phone] to be here at the hospital within the hour. SPD aware that pt will have a passenger riding with him. SPD also aware pt will need assistance going up about 4-5 steps in front of pts home.     Family requesting [facility]. Pt accepted via [facility].      VIKKI Navas LMSW  [id number]

## 2017-03-22 NOTE — SUBJECTIVE & OBJECTIVE
Interval History: Mr. Desai continues to have waxing and waning mental status. Overnight he had some confusion and attempted to exit his bed multiple times. He was given PRNs to help calm the patient and prevent self injury. He had no other acute overnight events. This morning he had no acute complaints or concerns and was resting comfortably in his bedside recliner. Mentation is at baseline. Plan is for discharge home with home health today.     Review of Systems   Unable to perform ROS: Dementia     Objective:     Vital Signs (Most Recent):  Temp: 98.2 °F (36.8 °C) (03/22/17 0800)  Pulse: 67 (03/22/17 0800)  Resp: 16 (03/22/17 0800)  BP: 121/63 (03/22/17 0800)  SpO2: 95 % (03/22/17 0800) Vital Signs (24h Range):  Temp:  [97.2 °F (36.2 °C)-98.2 °F (36.8 °C)] 98.2 °F (36.8 °C)  Pulse:  [67-78] 67  Resp:  [16-20] 16  SpO2:  [93 %-95 %] 95 %  BP: (102-149)/(58-80) 121/63     Weight: 76 kg (167 lb 8.8 oz)  Body mass index is 24.74 kg/(m^2).  No intake or output data in the 24 hours ending 03/22/17 0902   Physical Exam   Constitutional: No distress.   HENT:   Bruises on left side of the forehead.    Eyes: No scleral icterus.   Neck: No JVD present.   Cardiovascular: Normal rate.  An irregularly irregular rhythm present.   No murmur heard.  Pulmonary/Chest: Effort normal and breath sounds normal. No respiratory distress. He has no wheezes.   Abdominal: Soft. He exhibits no distension. There is no tenderness.   Musculoskeletal: He exhibits no edema or tenderness.   Neurological: He is alert.   Oriented to self and person.    Skin: Skin is warm. He is not diaphoretic.   Bruises on left shoulder        Significant Labs: All pertinent labs within the past 24 hours have been reviewed.    Significant Imaging: I have reviewed all pertinent imaging results/findings within the past 24 hours.

## 2017-03-22 NOTE — PROGRESS NOTES
[facility] Hospital Medicine  Progress Note    Patient Name: Migue Fraser  MRN: [id number]  Patient Class: IP- Inpatient   Admission Date: 3/10/2017  Length of Stay: 11 days  Attending Physician: No att. providers found  Primary Care Provider: Iggy Telles MD    [facility] Medicine Team: [facility] HOSP MED 5 Hal Goetz MD    Subjective:     Principal Problem:Traumatic subarachnoid hematoma with loss of consciousness    HPI:  89 yo M w PMHx of SSS and A-fib (on Elaquis), prostate cancer, and dementia who was experienced an unwitnessed fall at home. Pt was found down in his back yard by his son. CT scan demonstrated multiple parenchymal hemorrhages with superimposed SAH. Pt has dementia at baseline but according to son is markedly more confused than usual.     Hospital Course:  On admit, patient was administered PCC; and neurosurgery was consulted. Repeat CT scans showed stable small intracranial sub arachnoid hemorrhages. No acute NSGY was performed. Hospital course remarkable for agitation; requiring intermittent precedex ggt which was discontinued and transitioned to seroquel BID with PRN haldol. Home med metoprolol restarted for a fib. Stepped down to the floor on 3/17/2017. Repeat CT scan ruled out any further progressions. Revaluated by NSGY 2/2 an acute change in his mental status. However, was found to be in his baseline waxing and waning mental status. Seen by SLP 2/2 concerns for aspiration risk. He was cleared from swallowing perspective as long as feeding occurs while awake and not sleepy. Plan is to discontinue his home anticoagulation 2/2 bleeding risk and continue aspirin. He is currently stable. Plan is for discharge home with home health today.      Interval History: Mr. Desai continues to have waxing and waning mental status. Overnight he had some confusion and attempted to exit his bed multiple times. He was given PRNs to help calm the patient and prevent self injury.  He had no other acute overnight events. This morning he had no acute complaints or concerns and was resting comfortably in his bedside recliner. Mentation is at baseline. Plan is for discharge home with home health today.     Review of Systems   Unable to perform ROS: Dementia     Objective:     Vital Signs (Most Recent):  Temp: 98.2 °F (36.8 °C) (03/22/17 0800)  Pulse: 67 (03/22/17 0800)  Resp: 16 (03/22/17 0800)  BP: 121/63 (03/22/17 0800)  SpO2: 95 % (03/22/17 0800) Vital Signs (24h Range):  Temp:  [97.2 °F (36.2 °C)-98.2 °F (36.8 °C)] 98.2 °F (36.8 °C)  Pulse:  [67-78] 67  Resp:  [16-20] 16  SpO2:  [93 %-95 %] 95 %  BP: (102-149)/(58-80) 121/63     Weight: 76 kg (167 lb 8.8 oz)  Body mass index is 24.74 kg/(m^2).  No intake or output data in the 24 hours ending 03/22/17 0902   Physical Exam   Constitutional: No distress.   HENT:   Bruises on left side of the forehead.    Eyes: No scleral icterus.   Neck: No JVD present.   Cardiovascular: Normal rate.  An irregularly irregular rhythm present.   No murmur heard.  Pulmonary/Chest: Effort normal and breath sounds normal. No respiratory distress. He has no wheezes.   Abdominal: Soft. He exhibits no distension. There is no tenderness.   Musculoskeletal: He exhibits no edema or tenderness.   Neurological: He is alert.   Oriented to self and person.    Skin: Skin is warm. He is not diaphoretic.   Bruises on left shoulder        Significant Labs: All pertinent labs within the past 24 hours have been reviewed.    Significant Imaging: I have reviewed all pertinent imaging results/findings within the past 24 hours.    Assessment/Plan:      Atrial fibrillation  -- was on eliquis at home and was held during admission due to subarachnoid hemorrhage and given kecentra.  -- This morning his HR is normal with normal blood pressure. On toprol-xl 25 mg daily. Will consider increasing toprol 25 mg if there is persistent tachycardia.   -- due to his risk of fall, avoiding  anticoagulation is advised. Plan is to restart aspirin on 3/27/17 to provide some coverage given his high chadsvasc/has bled scores.           Agitation w/ insomnia   -- needed sedation in the ICU  -- currently not agitated this morning.   -- on Seroquel regular and haldol prn will not discharge with these medications.   -- Will discharge home with trazodone and ramelteon prn for insomnia           SAH (subarachnoid hemorrhage)  -- seen by neurosurgery and no intervention was done.  -- repeated CT scan was stable.  -- stable           Debility  -- PT/O  -- Pending placement to [facility]           Hypophosphatemia  -- resolved      VTE Risk Mitigation         Ordered     enoxaparin injection 40 mg  Daily     Route:  Subcutaneous        03/19/17 0705          Hal Goetz MD  Department of Hospital Medicine   [facility]

## 2017-03-22 NOTE — PLAN OF CARE
Problem: Occupational Therapy Goal  Goal: Occupational Therapy Goal  Goals to be met by: 3/25     Patient will increase functional independence with ADLs by performing:    UE Dressing while seated EOB with Set-up Assistance and Minimal Assistance.  LE Dressing while seated EOB with Set-up Assistance and Contact Guard Assistance.  Grooming while seated at sink with Set-up Assistance and Minimal Assistance.  Toileting from bedside commode with Minimal Assistance for hygiene and clothing management.   Sitting at edge of bed x10 minutes for dynamic functional task with Contact Guard Assistance.  Supine to sit with Contact Guard Assistance.   Toilet transfer to bedside commode with Contact Guard Assistance.  CG demo understanding for positioning and bed mobility.    Outcome: Ongoing (interventions implemented as appropriate)  Pt met no goals this date. Continue OT POC.     SILVANA Luevano/LAUREN  3/22/2017

## 2017-03-22 NOTE — PROGRESS NOTES
Please note the following patients information has been forwarded to [facility] for Case Management or .    Please contact Outpatient Complex Care Mgmt at ext [phone] with questions.    Thank you    Harriett Wylie, SSC

## 2017-03-22 NOTE — PT/OT/SLP PROGRESS
Physical Therapy  Treatment    Migue Fraser   MRN: [id number]   Admitting Diagnosis: Traumatic subarachnoid hematoma with loss of consciousness    PT Received On: 17  PT Start Time: 0856     PT Stop Time: 925    PT Total Time (min): 29 min       Billable Minutes:  Gait Training 15 and Therapeutic Exercise 14    Treatment Type: Treatment  PT/PTA: PT     PTA Visit Number: 1       General Precautions: Standard, fall  Orthopedic Precautions: N/A   Braces:  N/A    Do you have any cultural, spiritual, Scientology conflicts, given your current situation?: none noted    Subjective:  Communicated with nsg prior to session.  Pt was agreeable to therapy    Pain Ratin/10                   Objective:   Patient found with:  (pt found seated in chair)    Functional Mobility:  Bed Mobility:        Transfers:  Sit <> Stand Assistance: Contact Guard Assistance (pt performed x15 sit to stands from bedside chair req inc verbal cueing for hand placement )  Sit <> Stand Assistive Device: Rolling Walker    Gait:   Gait Distance: 50 ft; pt req inc verbal cues to avoid obstacles and constant verbal cues for neck extension.  Assistance 1: Contact Guard Assistance  Gait Assistive Device: Rolling walker  Gait Pattern: 3-point gait  Gait Deviation(s): decreased viral, decreased stride length, forward lean    Stairs:  Pt ascended/descend 3 stair(s) with No Assistive Device with left with Minimal Assistance.     Balance:   Static Sit: GOOD-: Takes MODERATE challenges from all directions but inconsistently  Dynamic Sit: GOOD: Maintains balance through MODERATE excursions of active trunk movement  Static Stand: FAIR: Maintains without assist but unable to take challenges  Dynamic stand: FAIR: Needs CONTACT GUARD during gait     Therapeutic Activities and Exercises:  Pt performed x15 sit to stands req inc verbal cueing for hand placement throughout.     EDUCATION  Pt educated pt on incr OOB activity including sitting in bedside chair  majority of day and amb with nsg and PCT.   Educated pt on being appropriate to transfer with nsg and PCT  Updated white board with appropriate PT information; notified nsg   Pt verbalized agreement.        AM-PAC 6 CLICK MOBILITY  How much help from another person does this patient currently need?   1 = Unable, Total/Dependent Assistance  2 = A lot, Maximum/Moderate Assistance  3 = A little, Minimum/Contact Guard/Supervision  4 = None, Modified [place]/Independent    Turning over in bed (including adjusting bedclothes, sheets and blankets)?: 3  Sitting down on and standing up from a chair with arms (e.g., wheelchair, bedside commode, etc.): 3  Moving from lying on back to sitting on the side of the bed?: 3  Moving to and from a bed to a chair (including a wheelchair)?: 3  Need to walk in hospital room?: 3  Climbing 3-5 steps with a railing?: 3  Total Score: 18    AM-PAC Raw Score CMS G-Code Modifier Level of Impairment Assistance   6 % Total / Unable   7 - 9 CM 80 - 100% Maximal Assist   10 - 14 CL 60 - 80% Moderate Assist   15 - 19 CK 40 - 60% Moderate Assist   20 - 22 CJ 20 - 40% Minimal Assist   23 CI 1-20% SBA / CGA   24 CH 0% Independent/ Mod I     Patient left up in chair with call button in reach and nsg notified.    Assessment:  Migue Fraser is a 89 y.o. male with a medical diagnosis of Traumatic subarachnoid hematoma with loss of consciousness and presents with weakness and cognitive deficits limiting functional mobility. Pt ambulated safely with CGA but req verbal cues to navigate obstacles; pt safely ascended and descended 3 stairs but req cueing to manage RW to handrail transition, pt would be safe to ascend stairs at home with assistance. Pt would benefit from HH PT to increase strength and maintain current level of functioning.    Rehab identified problem list/impairments: Rehab identified problem list/impairments: weakness, impaired endurance, impaired cognition, decreased  safety awareness, impaired balance, impaired coordination    Rehab potential is good.    Activity tolerance: Good    Discharge recommendations: Discharge Facility/Level Of Care Needs: home health PT     Barriers to discharge: Barriers to Discharge: Inaccessible home environment, Decreased caregiver support    Equipment recommendations: Equipment Needed After Discharge: none     GOALS:   Physical Therapy Goals        Problem: Physical Therapy Goal    Goal Priority Disciplines Outcome Goal Variances Interventions   Physical Therapy Goal     PT/OT, PT Ongoing (interventions implemented as appropriate)     Description:  PT goals for the next 10 visits    1. Pt supine to sit with minimal assist-not met  2. Pt sit to supine with CGA-not met  3. Pt sit to stand with RW with CGA-not met  4. Pt to perform gait 100ft with RW with minimal assist.-not met  5. Pt to transfer bed to/from bedside chair with RW with minimal assist.-MET 3/16  6. Pt to up/down 6 steps with B UE rails with minimal assist.-not met   7. Pt to perform B LE exs in sitting or supine x 20 reps with family assist.-not met                 PLAN:    Pt discharged from acute PT services       JENNIE Puckett  03/22/2017

## 2017-03-22 NOTE — PROGRESS NOTES
Discharge instructions given to patient, daughter and caregiver.  PIV removed with pressure dressing applied. Tolerated well. Discharged home.

## 2017-03-23 NOTE — PT/OT/SLP DISCHARGE
Occupational Therapy Discharge Summary    Migue Fraser  MRN: [id number]   Traumatic subarachnoid hematoma with loss of consciousness   Patient Discharged from acute Occupational Therapy on 3/22/17.  Please refer to prior OT note dated on 3/22/17 for functional status.     Assessment:   Patient was discharge unexpectedly.  Information required to complete and accurate discharge summary is unknown.  Refer to therapy initial evaluation and last progress note for initial and most recent functional status and goal achievement.  Recommendations made may be found in medical record.  GOALS:   Occupational Therapy Goals        Problem: Occupational Therapy Goal    Goal Priority Disciplines Outcome Interventions   Occupational Therapy Goal     OT, PT/OT Ongoing (interventions implemented as appropriate)    Description:  Goals to be met by: 3/25     Patient will increase functional independence with ADLs by performing:    UE Dressing while seated EOB with Set-up Assistance and Minimal Assistance.  LE Dressing while seated EOB with Set-up Assistance and Contact Guard Assistance.  Grooming while seated at sink with Set-up Assistance and Minimal Assistance.  Toileting from bedside commode with Minimal Assistance for hygiene and clothing management.   Sitting at edge of bed x10 minutes for dynamic functional task with Contact Guard Assistance.  Supine to sit with Contact Guard Assistance.   Toilet transfer to bedside commode with Contact Guard Assistance.  CG demo understanding for positioning and bed mobility.                Reasons for Discontinuation of Therapy Services  Transfer to alternate level of care.      Plan:  Patient Discharged to: Home with Home Health Service.    Jazzmine Hammer OTR/L  3/23/2017

## 2017-03-24 ENCOUNTER — PATIENT OUTREACH (OUTPATIENT)
Dept: ADMINISTRATIVE | Facility: CLINIC | Age: 82
End: 2017-03-24
Payer: COMMERCIAL

## 2017-03-24 NOTE — PATIENT INSTRUCTIONS
Head Injury (Adult)    You have a head injury. It does not appear serious at this time. But symptoms of a more serious problem, such as a mild brain injury (concussion) or bruising or bleeding in the brain, may appear later. For this reason, you or someone caring for you will need to watch for the symptoms listed below. Once youre home, also be sure to follow any care instructions youre given.  Home care  Watch for the following symptoms  Seek emergency medical care if you have any of these symptoms over the next hours to days:   · Headache  · Nausea or vomiting  · Dizziness  · Sensitivity to light or noise  · Unusual sleepiness or grogginess  · Trouble falling asleep  · Personality changes  · Vision changes  · Memory loss  · Confusion  · Trouble walking or clumsiness  · Loss of consciousness (even for a short time)  · Inability to be awakened  · Stiff neck  · Weakness or numbness in any part of the body  · Seizures  General care  · If you were prescribed medicines for pain, use them as directed. Note: Dont take other medicines for pain without talking to your provider first.  · To help reduce swelling and pain, apply a cold source to the injured area for up to 20 minutes at a time. Do this as often as directed. Use a cold pack or bag of ice wrapped in a thin towel. Never apply a cold source directly to the skin.  · If you have cuts or scrapes as a result of your head injury, care for them as directed.  · For the next 24 hours (or longer, if instructed):  ¨ Dont drink alcohol or use sedatives or other medicines that make you sleepy.  ¨ Dont drive or operate machinery.  ¨ Dont do anything strenuous, such as heavy lifting or straining.  ¨ Limit tasks that require concentration. This includes reading, using a smartphone or computer, watching TV, and playing video games.  ¨ Dont return to sports or other activities that could result in another head injury.  Follow-up care  Follow up with your healthcare  provider, or as directed. If imaging tests were done, they will be reviewed by a doctor. You will be told the results and any new findings that may affect your care.  When to seek medical advice  Call your healthcare provider right away if any of these occur:  · Pain doesnt get better or worsens  · New or increased swelling or bruising  · Fever of 100.4°F (38°C) or higher, or as directed by your provider  · Increased redness, warmth, drainage, or bleeding from the injured area  · Fluid drainage or bleeding from the nose or ears  · Any depression or bony abnormality in the injured area  Date Last Reviewed: 9/26/2015 © 2000-2016 [employer]. [address] [place] [address], [place] [address]. All rights reserved. This information is not intended as a substitute for professional medical care. Always follow your healthcare professional's instructions.

## 2017-03-24 NOTE — PT/OT/SLP DISCHARGE
Physical Therapy Discharge Summary    Migue Fraser  MRN: [id number]   Traumatic subarachnoid hematoma with loss of consciousness   Patient Discharged from acute Physical Therapy on 3/22/2017  Please refer to prior PT noted date on 3/22/2017 for functional status.     Assessment:   Patient appropriate for care in another setting.  GOALS:   Physical Therapy Goals        Problem: Physical Therapy Goal    Goal Priority Disciplines Outcome Goal Variances Interventions   Physical Therapy Goal     PT/OT, PT Ongoing (interventions implemented as appropriate)     Description:  PT goals for the next 10 visits    1. Pt supine to sit with minimal assist-not met  2. Pt sit to supine with CGA-not met  3. Pt sit to stand with RW with CGA-not met  4. Pt to perform gait 100ft with RW with minimal assist.-not met  5. Pt to transfer bed to/from bedside chair with RW with minimal assist.-MET 3/16  6. Pt to up/down 6 steps with B UE rails with minimal assist.-not met   7. Pt to perform B LE exs in sitting or supine x 20 reps with family assist.-not met               Reasons for Discontinuation of Therapy Services  Transfer to alternate level of care.      Plan:  Patient Discharged to:Home with HH PT; therapy recommended  Skilled Nursing Facility.    I certify that I was present in the room directing the student in service delivery and guiding them using my skilled judgment. As the co-signing therapist I have reviewed the students documentation and am responsible for the treatment, assessment, and plan.     Yoon Rocha, PT, DPT  3/24/2017

## 2017-03-29 ENCOUNTER — HOSPITAL ENCOUNTER (OUTPATIENT)
Dept: RADIOLOGY | Facility: HOSPITAL | Age: 82
Discharge: HOME | End: 2017-03-29
Attending: NEUROLOGICAL SURGERY
Payer: COMMERCIAL

## 2017-03-29 ENCOUNTER — OFFICE VISIT (OUTPATIENT)
Dept: NEUROSURGERY | Facility: CLINIC | Age: 82
End: 2017-03-29
Payer: COMMERCIAL

## 2017-03-29 VITALS
BODY MASS INDEX: 22.9 KG/M2 | HEIGHT: 69 IN | HEART RATE: 103 BPM | WEIGHT: 154.63 LBS | TEMPERATURE: 98 F | SYSTOLIC BLOOD PRESSURE: 112 MMHG | DIASTOLIC BLOOD PRESSURE: 77 MMHG

## 2017-03-29 DIAGNOSIS — I60.9 SAH (SUBARACHNOID HEMORRHAGE): ICD-10-CM

## 2017-03-29 DIAGNOSIS — S06.5XAA SDH (SUBDURAL HEMATOMA): ICD-10-CM
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
## 2017-03-29 DIAGNOSIS — I48.0 PAROXYSMAL ATRIAL FIBRILLATION: ICD-10-CM

## 2017-03-29 DIAGNOSIS — Z79.01 CURRENT USE OF LONG TERM ANTICOAGULATION: Primary | ICD-10-CM

## 2017-03-29 DIAGNOSIS — Z79.01 CURRENT USE OF LONG TERM ANTICOAGULATION: ICD-10-CM
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
## 2017-03-29 PROCEDURE — 1159F MED LIST DOCD IN RCRD: CPT | Mod: S$GLB,,, | Performed by: PHYSICIAN ASSISTANT

## 2017-03-29 PROCEDURE — 99999 PR PBB SHADOW E&M-EST. PATIENT-LVL III: CPT | Mod: PBBFAC,,, | Performed by: PHYSICIAN ASSISTANT

## 2017-03-29 PROCEDURE — 1126F AMNT PAIN NOTED NONE PRSNT: CPT | Mod: S$GLB,,, | Performed by: PHYSICIAN ASSISTANT

## 2017-03-29 PROCEDURE — 70450 CT HEAD/BRAIN W/O DYE: CPT | Mod: TC

## 2017-03-29 PROCEDURE — 99213 OFFICE O/P EST LOW 20 MIN: CPT | Mod: S$GLB,,, | Performed by: PHYSICIAN ASSISTANT

## 2017-03-29 PROCEDURE — 70450 CT HEAD/BRAIN W/O DYE: CPT | Mod: 26,,, | Performed by: RADIOLOGY

## 2017-03-29 PROCEDURE — 1157F ADVNC CARE PLAN IN RCRD: CPT | Mod: S$GLB,,, | Performed by: PHYSICIAN ASSISTANT

## 2017-03-29 PROCEDURE — 1160F RVW MEDS BY RX/DR IN RCRD: CPT | Mod: S$GLB,,, | Performed by: PHYSICIAN ASSISTANT

## 2017-03-29 NOTE — PROGRESS NOTES
[facility] - Neurosurgery 7th Fl  History & Physical  Neurosurgery    SUBJECTIVE:     Chief Complaint/Reason for Admission: Hospital FU for SDH and SAH    History of Present Illness:  Mr. Fraser is a 89 y.o. male who presents to clinic today for hospital follow up after a mechanical fall that happened on 3/10/17. Patient has a history of Afib and was taking Eliquis and Aspirin. He was found to have a right frontal SDH and small scattered tSAH. He was treated conservatively and Eliquis was held. His Aspirin has been restarted, but he has continued to hold the Eliquis. He presents to clinic today with multiple family members. Patient states that he has done well since discharge. Denies any new falls, headaches, vision changes, N/V, new weakness or seizure like activity. Family state patient is at his neuro baseline. Deny any increased confusion or lethargy.         (Not in a hospital admission)    Review of patient's allergies indicates:   Allergen Reactions    Penicillins        Past Medical History:   Diagnosis Date    A-fib     Atrial fibrillation     Blastomycosis     Cancer     prostate CA    Glaucoma     Sick sinus syndrome      Past Surgical History:   Procedure Laterality Date    APPENDECTOMY      CHOLECYSTECTOMY      HERNIA REPAIR      JOINT REPLACEMENT       History reviewed. No pertinent family history.  Social History   Substance Use Topics    Smoking status: Never Smoker    Smokeless tobacco: None    Alcohol use Yes      Comment: 1 drink a night        OBJECTIVE:     Vital Signs (Most Recent):  Temp: 97.6 °F (36.4 °C) (03/29/17 1342)  Pulse: 103 (03/29/17 1342)  BP: 112/77 (03/29/17 1342)    Physical Exam:  General: well developed, well nourished, no distress.   Head: normocephalic, left periorbital ecchymosis that appears to be subacute.   Neurologic: Alert, confused at baseline.   GCS: Motor: 6/Verbal: 4/Eyes: 4 GCS Total: 14  Mental Status: Awake, Alert, Oriented to self only.   Language:  No aphasia  Speech: No dysarthria  Cranial nerves: face symmetric, tongue midline, CN II-XII grossly intact.   Eyes: pupils equal, round, reactive to light with accomodation, EOMI.   Pulmonary: normal respirations, no signs of respiratory distress  Abdomen: soft, non-distended, not tender to palpation  Sensory: intact to light touch throughout  Motor Strength:Moves all extremities spontaneously with good tone. Generalized deconditioning, no focal weakness. Upper and lower extremities 5-/5. No abnormal movements seen.   Pronator Drift: no drift noted  Finger-to-nose: Intact bilaterally  Clonus: absent  No LE edema  Skin: Skin is warm, dry and intact.        Diagnostic Results:  Head CT:  I independently reviewed the imaging.     Evolution of multicompartmental hemorrhages, overall less conspicuous and improved in comparison to prior.    Stable mild generalized cervical volume loss and chronic ischemic changes.    No new infarct or hemorrhage identified.    ASSESSMENT/PLAN:     Assessment:  Mr. Fraser is a 89 y.o. male s/p mechanical fall on 3/10/17. Patient has a history of Afib and was taking Eliquis and Aspirin. He was found to have a right frontal SDH and small scattered tSAH. He was treated conservatively and Eliquis/ASA was held.     Plan:  -Patient neurologically stable and at baseline  -Head CT shows mostly resolution of SDH and tSAH. No evidence of new hemorrhage.  -No medication restrictions. OK to continue ASA. Instructed family to call Cardiologist and ask if he wants patient to restart Eliquis. If yes, then advise how to resume. Continue management of Afib per Cardiology. Family voiced understanding.   -Continue fall precautions  -Activity as tolerated  -Return to clinic PRN new or worsening symptoms  -Encouraged patient and family to call the clinic with any questions or concerns      Kayla Bell PA-C   Neurosurgery   Pager: [phone]

## 2017-04-03 RX ORDER — TRAMADOL HYDROCHLORIDE 50 MG/1
50 TABLET ORAL EVERY 6 HOURS PRN
Refills: 0 | OUTPATIENT
Start: 2017-04-03

## 2017-04-03 NOTE — TELEPHONE ENCOUNTER
----- Message from Rey Salazar MA sent at 4/3/2017 11:28 AM CDT -----  Contact: daughter / galo - [phone]   Type: Rx    Name of medication(s): trazodone (DESYREL) 50 MG tablet    Is this a refill? New rx? Refill     Who prescribed medication? Dr Hal Goetz    [facility], & [facility]    Comments: Please call. Thanks!

## 2017-04-03 NOTE — TELEPHONE ENCOUNTER
Tramadol is an opioid controlled substance that will increase the chances of falls in this 89yo man who recently had a fall. I do not recommend using this. Instead, use tylenol for pain if needed.    Rx declined, please let them know why

## 2017-04-04 ENCOUNTER — TELEPHONE (OUTPATIENT)
Dept: ELECTROPHYSIOLOGY | Facility: CLINIC | Age: 82
End: 2017-04-04

## 2017-04-04 RX ORDER — TRAZODONE HYDROCHLORIDE 50 MG/1
TABLET ORAL
Qty: 30 TABLET | Refills: 5 | Status: SHIPPED | OUTPATIENT
Start: 2017-04-04 | End: 2017-10-10 | Stop reason: SDUPTHER

## 2017-04-04 NOTE — TELEPHONE ENCOUNTER
Pt daughter requesting Trazodone not Tramodol. Seems there was a mix up with the receiving caller. Please advise. Pt needs Rx filled to help him sleep.

## 2017-04-04 NOTE — TELEPHONE ENCOUNTER
Nurse spoke with Daughter (Prema) and advise per Dr Green's suggestion below. Ms Prema acknowledged and will pass on to rest of family.    ----- Message from Robin Green MD sent at 4/4/2017  7:43 AM CDT -----  Contact: pt's daughter-Prema  My bias would be to hold off on eliquis. He has had several mechanical falls over the years and I think the risks outweigh the benefits at this point.    GP  ----- Message -----     From: Franco Wright RN     Sent: 4/3/2017   2:47 PM       To: Robin Green MD    Doc-   Patient had fall 3/10 with SDH and eliquis was stopped. 3/29/17 pt had f/u with neuro and restarted asa and SDH cleared up. Do you wish them to restart eliquis?  ----- Message -----     From: Arpita Graypari     Sent: 4/3/2017  11:32 AM       To: Franco Wright RN    Pt's daughter called because since the pt fell and was taken off the Eliquis because of a brain bleed while he was in the hospital should he be put back on it at some time?  She can be reached @ [phone].  Thanks!

## 2017-04-07 ENCOUNTER — CLINICAL SUPPORT (OUTPATIENT)
Dept: INTERNAL MEDICINE | Facility: CLINIC | Age: 82
End: 2017-04-07
Payer: COMMERCIAL

## 2017-04-07 PROCEDURE — 96372 THER/PROPH/DIAG INJ SC/IM: CPT | Mod: S$GLB,,, | Performed by: FAMILY MEDICINE

## 2017-04-07 RX ADMIN — CYANOCOBALAMIN 1000 MCG: 1000 INJECTION, SOLUTION INTRAMUSCULAR; SUBCUTANEOUS at 01:04

## 2017-04-21 ENCOUNTER — OFFICE VISIT (OUTPATIENT)
Dept: NEUROLOGY | Facility: CLINIC | Age: 82
End: 2017-04-21
Payer: COMMERCIAL

## 2017-04-21 VITALS
HEIGHT: 69 IN | DIASTOLIC BLOOD PRESSURE: 62 MMHG | BODY MASS INDEX: 22.73 KG/M2 | SYSTOLIC BLOOD PRESSURE: 107 MMHG | WEIGHT: 153.44 LBS | HEART RATE: 81 BPM

## 2017-04-21 DIAGNOSIS — G47.00 INSOMNIA, UNSPECIFIED TYPE: ICD-10-CM

## 2017-04-21 DIAGNOSIS — F01.518 VASCULAR DEMENTIA WITH BEHAVIOR DISTURBANCE: Primary | ICD-10-CM

## 2017-04-21 DIAGNOSIS — I60.9 SAH (SUBARACHNOID HEMORRHAGE): ICD-10-CM

## 2017-04-21 PROCEDURE — 1160F RVW MEDS BY RX/DR IN RCRD: CPT | Mod: S$GLB,,, | Performed by: NURSE PRACTITIONER

## 2017-04-21 PROCEDURE — 1159F MED LIST DOCD IN RCRD: CPT | Mod: S$GLB,,, | Performed by: NURSE PRACTITIONER

## 2017-04-21 PROCEDURE — 1126F AMNT PAIN NOTED NONE PRSNT: CPT | Mod: S$GLB,,, | Performed by: NURSE PRACTITIONER

## 2017-04-21 PROCEDURE — 99999 PR PBB SHADOW E&M-EST. PATIENT-LVL III: CPT | Mod: PBBFAC,,, | Performed by: NURSE PRACTITIONER

## 2017-04-21 PROCEDURE — 1157F ADVNC CARE PLAN IN RCRD: CPT | Mod: 8P,S$GLB,, | Performed by: NURSE PRACTITIONER

## 2017-04-21 PROCEDURE — 99215 OFFICE O/P EST HI 40 MIN: CPT | Mod: S$GLB,,, | Performed by: NURSE PRACTITIONER

## 2017-04-21 NOTE — PROGRESS NOTES
Name: Migue Fraser  MRN: [id number]   CSN: [id number]      Date: 04/21/2017    Referring physician:  Iggy Telles MD  [address]  [place] [id number]    Chief Complaint / Interval History: fell and had bleeding on brain    History of Present Illness (HPI):    89 yo male with dementia, vascular vs AD, last seen in office in February. Family requested sooner appt as he fell 3-10, suffered SAH (on long-term anti-coag), rec'd conservative treatment, now off Elaquis and is back home with his wife and caregivers. Since being back home, he now has 24/7 sitters. He is accompanied today by his wife (Rebekah), daughter (Prema) and daytime sitter (Francisco Javier).     They have noted more confusion than normal since the fall and has had increased difficulty sleeping at night, despite trazodone 50 mg that was started in the hospital. He typically stays up most of the night and sleeps a great deal during the day. He likes to dote on his wife and repeatedly tries to cover her up and pet her arm, which keeps her from needed sleep. He does not have any interests for sitters to use for distraction as has lost interest. Sitters feel their job is to keep him calm and not create a safety situation where he is agitated or could hurt himself.     No falls since 3-10-17. No HA or vision disturbance. No N/V. No seizures.     Physically, he is getting stronger and better each week. Cognitively, he is not better.        Date: 02/10/2017     Referring physician:  Iggy Telles MD  [address]  [place] [address]     Chief Complaint / Interval History: memory     History of Present Illness (HPI):     89 yo male with dementia, possible vascular dementia, last seen in office by Dr. Dumont, accompanied by wife and daughter.      Sleep is good  Appetite is good. No sisues swallowing or choking.   When asked about his mood, states he is very happy and thinks his memory is pretty good. He says he thinks he still works.  Wife shakes her head that he does not still work.      Lexapro did help hypersexual thoughts. Still occurs but not as often. He is no longer waking her up. She has hired a sitter 3 days/week - stays 4 hours at a time. Has been very helpful for his wife! She is doing much better, not as fatigued. She is not well herself. Daughters are also coming in a day per week when sitter is not there. He will occasionally urinate on the floor. Has refused Depends. Will occasionally need some direction on how to get to room in his home.   Will dress himself but not always appropriate. Came down the other day and said he knew something was missing because he was cold. He had shirt and underwear on but no pants. He is able to feed himself.      Referring physician:  Iggy Telles MD  [address] [place] [address]     Chief Complaint / Interval History: memory     History of Present Illness (HPI):     89 yo male with dementia, possible vascular dementia, last seen in office by Dr. Dumont, accompanied by wife and daughter.      Sleep is good  Appetite is good. No sisues swallowing or choking.   When asked about his mood, states he is very happy and thinks his memory is pretty good. He says he thinks he still works. Wife shakes her head that he does not still work.      Lexapro did help hypersexual thoughts. Still occurs but not as often. He is no longer waking her up. She has hired a sitter 3 days/week - stays 4 hours at a time. Has been very helpful for his wife! She is doing much better, not as fatigued. She is not well herself. Daughters are also coming in a day per week when sitter is not there. He will occasionally urinate on the floor. Has refused Depends. Will occasionally need some direction on how to get to room in his home.   Will dress himself but not always appropriate. Came down the other day and said he knew something was missing because he was cold. He had shirt and underwear on but no pants. He is  able to feed himself.    Diagnoses:          Dementia, vascular- does have some features of AD as well     Medical Decision Making:     For now, will continue meds without change.   Watch libido! If this increases or becomes problematic, wife will call.   Will have to be cautious with any meds due to age.   Call for problems.  Follow up 4 months. Requests Friday appt.     Nonmotor/Premotor ROS:  Dysphagia (HENT)?No  RBD/sleep issues (Constitutional)?Yes  Falls (Musculoskeletal)?Yes  Cognitive impairment (Neurologic)?Yes  Pain/Paresthesia (Neurologic)?No      Past Medical History: The patient  has a past medical history of A-fib; Atrial fibrillation; Blastomycosis; Cancer; Glaucoma; and Sick sinus syndrome.    Social History: The patient  reports that he has never smoked. He does not have any smokeless tobacco history on file. He reports that he drinks alcohol. He reports that he does not use illicit drugs.    Family History: Their family history is not on file.    Allergies: Penicillins     Meds:   Current Outpatient Prescriptions on File Prior to Visit   Medication Sig Dispense Refill    aspirin 81 MG Chew Take 1 tablet (81 mg total) by mouth once daily. Start taking this on the 3/27  0    escitalopram oxalate (LEXAPRO) 20 MG tablet Take 1 tablet (20 mg total) by mouth once daily. 30 tablet 11    latanoprost 0.005 % ophthalmic solution Place 1 drop into both eyes every evening.       metoprolol succinate (TOPROL-XL) 25 MG 24 hr tablet Take 1 tablet (25 mg total) by mouth once daily. 90 tablet 3    timolol maleate 0.5% (TIMOPTIC) 0.5 % Drop Place 1 drop into both eyes 2 (two) times daily.  1    tramadol (ULTRAM) 50 mg tablet Take 50 mg by mouth every 6 (six) hours as needed for Pain.   0    trazodone (DESYREL) 50 MG tablet take 1 tablet by mouth at bedtime if needed for insomnia 30 tablet 5     Current Facility-Administered Medications on File Prior to Visit   Medication Dose Route Frequency Provider Last  "Rate Last Dose    cyanocobalamin injection 1,000 mcg  1,000 mcg Intramuscular Q30 Days Leslie Perdomo, NP   1,000 mcg at 04/07/17 1326       Exam:  /62 (BP Location: Left arm, Patient Position: Sitting, BP Method: Automatic)  Pulse 81  Ht 5' 9" (1.753 m)  Wt 69.6 kg (153 lb 7 oz)  BMI 22.66 kg/m2    Constitutional  Well-developed, well-nourished, appears stated age  Small, yellow bruising left cheek area.   Cardiovascular  Radial pulses 2+ and symmetric, no LE edema bilaterally   Neurological    * Mental status  MOCA = deferred     - Orientation  Awake, alert, oriented to person. Identifies daughter and wife by their relation but struggles to do this, does not identify either by name when asked. Does say his wife's name appropriately later in visit. He cannot identify sitter as such or his name but describes him as his neighbor and says sees him all the time. He adds that he is the one that helps him and smiles.      - Memory   Impaired     - Attention/concentration  Attentive at times during visit- generally when spoken to only.      - Language  Difficulty following commands. Many instructions must be repeated or re-demonstrated but does participate and is cooperative.      - Fund of knowledge  Not aware of current events     - Executive  ImpairedWell-organized thoughts   * Cranial nerves       - CN II  PERRL, visual fields full to confrontation     - CN III, IV, VI  Extraocular movements full, normal pursuits and saccades     - CN V  Sensation V1 - V3 intact     - CN VII  Face strong and symmetric bilaterally     - CN VIII  Hearing intact bilaterally     - CN IX, X  Palate raises midline and symmetric     - CN XI  SCM and trapezius 5/5 bilaterally     - CN XII  Tongue midline   * Motor  Muscle bulk normal, strength 5/5 throughout   * Coordination  No dysmetria with finger-to-nose or heel-to-shin   * Gait  WC. Pushes self to stand. Slt stooped at head and hips. No diminished arm swing. Normal based. " Normal steps. Pace slt slowed. Steady today..     Laboratory/Radiological:  - Results:  Admission on 03/10/2017, Discharged on 03/22/2017   No results displayed because visit has over 200 results.      Lab Visit on 01/27/2017   Component Date Value Ref Range Status    WBC 01/27/2017 4.26  3.90 - 12.70 K/uL Final    RBC 01/27/2017 3.72* 4.60 - 6.20 M/uL Final    Hemoglobin 01/27/2017 12.9* 14.0 - 18.0 g/dL Final    Hematocrit 01/27/2017 39.1* 40.0 - 54.0 % Final    MCV 01/27/2017 105* 82 - 98 fL Final    MCH 01/27/2017 34.7* 27.0 - 31.0 pg Final    MCHC 01/27/2017 33.0  32.0 - 36.0 % Final    RDW 01/27/2017 13.0  11.5 - 14.5 % Final    Platelets 01/27/2017 158  150 - 350 K/uL Final    MPV 01/27/2017 10.4  9.2 - 12.9 fL Final    Gran # 01/27/2017 2.3  1.8 - 7.7 K/uL Final    Lymph # 01/27/2017 1.4  1.0 - 4.8 K/uL Final    Mono # 01/27/2017 0.5  0.3 - 1.0 K/uL Final    Eos # 01/27/2017 0.1  0.0 - 0.5 K/uL Final    Baso # 01/27/2017 0.03  0.00 - 0.20 K/uL Final    Gran% 01/27/2017 52.8  38.0 - 73.0 % Final    Lymph% 01/27/2017 32.2  18.0 - 48.0 % Final    Mono% 01/27/2017 10.8  4.0 - 15.0 % Final    Eosinophil% 01/27/2017 3.3  0.0 - 8.0 % Final    Basophil% 01/27/2017 0.7  0.0 - 1.9 % Final    Differential Method 01/27/2017 Automated   Final    Sodium 01/27/2017 140  136 - 145 mmol/L Final    Potassium 01/27/2017 4.6  3.5 - 5.1 mmol/L Final    Chloride 01/27/2017 102  95 - 110 mmol/L Final    CO2 01/27/2017 29  23 - 29 mmol/L Final    Glucose 01/27/2017 101  70 - 110 mg/dL Final    BUN, Bld 01/27/2017 16  10 - 30 mg/dL Final    Creatinine 01/27/2017 1.0  0.5 - 1.4 mg/dL Final    Calcium 01/27/2017 9.3  8.7 - 10.5 mg/dL Final    Total Protein 01/27/2017 7.0  6.0 - 8.4 g/dL Final    Albumin 01/27/2017 4.2  3.5 - 5.2 g/dL Final    Total Bilirubin 01/27/2017 0.7  0.1 - 1.0 mg/dL Final    Alkaline Phosphatase 01/27/2017 60  55 - 135 U/L Final    AST 01/27/2017 17  10 - 40 U/L Final    ALT  "01/27/2017 12  10 - 44 U/L Final    Anion Gap 01/27/2017 9  8 - 16 mmol/L Final    eGFR if African American 01/27/2017 >60  >60 mL/min/1.73 m^2 Final    eGFR if non African American 01/27/2017 >60  >60 mL/min/1.73 m^2 Final    Vitamin B-12 01/27/2017 >2000* 210 - 950 pg/mL Final         Impression        Evolution of multicompartmental hemorrhages, overall less conspicuous and improved in comparison to prior.    Stable mild generalized cervical volume loss and chronic ischemic changes.    No new infarct or hemorrhage identified.  ______________________________________     Electronically signed by resident: GRIS HOWELL MD  Date: 03/17/17         Diagnoses:            Dementia   -vascular vs AD   -increased confusion since SAH  Recent SAH after fall and long term anti-coag (3-10-17)   -conservative measures, no surgical intervention   -no longer on anti-coags  Sleep disturbances   -sleeps during day and awake at night        Medical Decision Making:    Family inquires about increase of trazodone. I am not in favor of this at present for this 89 yo gentleman. I fear he will be more sedated when is up at night, increasing his risk for another fall. I have discussed this with them at length. I think the first approach should be to prohibit the daytime sleeping. He is sleeping 5-8 hours per day and then up the better part of the night. He has around the clock sitters. Discussed various strategies to try and engage him- even if he has never been interested before. Ex- cards, puzzles, coloring, folding wash cloths, "working" on paper, going for a ride,etc. I don't think it is realistic to think he can sleep a large part of the day and then sleep through the night, too.   Suggest adding 3 mg Melatonin one hour before bed in addition to the trazodone to see if this helps. However, need to incorporate the other measures as well.   They have many questions. Answered.   The increased confusion after the SAH is not " unexpected. Discussed the slow recovery with this, especially since he has dementia. No way to predict if/when he will return to his cognitive baseline before the fall.   Follow up as scheduled last visit.     I spent 45 minutes face-to-face with the patient, wife, daughter, and sitter with >80% of the time spent with counseling and education regarding:  - results of data, diagnosis, and recommendations stated above  - the prognosis of head injuries and head injuries in dementia  - risks and benefits of sleep aids  - importance of diet and exercise    Leslie Perdomo, MELY, NP-C  Division of Movement and Memory Disorders  [facility]  [phone]

## 2017-04-21 NOTE — LETTER
April 22, 2017      Iggy Telles MD  [address]  [facility] [address]           [facility]  Neurology  [address]  [facility] [address]  Phone: [phone]  Fax: [phone]          Patient: Migue Fraser   MR Number: [id number]   YOB: 1925   Date of Visit: 4/21/2017       Dear Dr. Iggy Telles:    Thank you for referring Migue Fraser to me for evaluation. Attached you will find relevant portions of my assessment and plan of care.    If you have questions, please do not hesitate to call me. I look forward to following Migue Fraser along with you.    Sincerely,    Leslie Perdomo NP    Enclosure  CC:  No Recipients    If you would like to receive this communication electronically, please contact [email or web address] or [phone] to request more information on [employer] access.    For providers and/or their staff who would like to refer a patient to [facility], please contact us through our one-stop-shop provider referral line, [facility], at [phone].    If you feel you have received this communication in error or would no longer like to receive these types of communications, please e-mail [email or web address]

## 2017-04-24 ENCOUNTER — OFFICE VISIT (OUTPATIENT)
Dept: INTERNAL MEDICINE | Facility: CLINIC | Age: 82
End: 2017-04-24
Payer: COMMERCIAL

## 2017-04-24 VITALS
HEART RATE: 67 BPM | OXYGEN SATURATION: 94 % | HEIGHT: 69 IN | DIASTOLIC BLOOD PRESSURE: 60 MMHG | BODY MASS INDEX: 23.51 KG/M2 | WEIGHT: 158.75 LBS | SYSTOLIC BLOOD PRESSURE: 120 MMHG

## 2017-04-24 DIAGNOSIS — S06.6X1D: Primary | ICD-10-CM

## 2017-04-24 DIAGNOSIS — M79.651 RIGHT THIGH PAIN: ICD-10-CM

## 2017-04-24 PROCEDURE — 1157F ADVNC CARE PLAN IN RCRD: CPT | Mod: 8P,S$GLB,, | Performed by: FAMILY MEDICINE

## 2017-04-24 PROCEDURE — 1159F MED LIST DOCD IN RCRD: CPT | Mod: S$GLB,,, | Performed by: FAMILY MEDICINE

## 2017-04-24 PROCEDURE — 1126F AMNT PAIN NOTED NONE PRSNT: CPT | Mod: S$GLB,,, | Performed by: FAMILY MEDICINE

## 2017-04-24 PROCEDURE — 99213 OFFICE O/P EST LOW 20 MIN: CPT | Mod: S$GLB,,, | Performed by: FAMILY MEDICINE

## 2017-04-24 PROCEDURE — 1160F RVW MEDS BY RX/DR IN RCRD: CPT | Mod: S$GLB,,, | Performed by: FAMILY MEDICINE

## 2017-04-24 PROCEDURE — 99999 PR PBB SHADOW E&M-EST. PATIENT-LVL III: CPT | Mod: PBBFAC,,, | Performed by: FAMILY MEDICINE

## 2017-04-24 RX ORDER — TALC
POWDER (GRAM) TOPICAL
COMMUNITY

## 2017-04-24 NOTE — PROGRESS NOTES
Subjective:       Patient ID: Migue Fraser is a 89 y.o. male.    Chief Complaint: Hospital Follow Up    HPI Comments: Here for f/u hospitalization  Recent neurology note reviewed  Most of the 20 min visit was spent in discussing with pt's wife and son about his situation, that there is little to do about his occasional behavious that are frustrating for wife without sedation that increased falls and not worth doing.  I believe his medical care is optimized at this point    Review of Systems   Constitutional: Negative for chills and fatigue.   HENT: Negative for ear pain.    Eyes: Negative for pain.   Respiratory: Negative for chest tightness.    Cardiovascular: Negative for chest pain.   Gastrointestinal: Negative for abdominal pain.   Genitourinary: Negative for flank pain.   Musculoskeletal: Negative for gait problem.   Neurological: Negative for syncope.   Psychiatric/Behavioral: Negative for behavioral problems.       Objective:      Physical Exam   Constitutional: He appears well-developed.   HENT:   Head: Normocephalic and atraumatic.   Eyes: EOM are normal.   Neck: Neck supple.   Cardiovascular: Normal rate.    Pulmonary/Chest: Breath sounds normal. No respiratory distress. He has no wheezes. He has no rales.   Abdominal: Soft.   Musculoskeletal: He exhibits no edema.   Neurological: He is alert.   Skin: Skin is dry.   Psychiatric: His behavior is normal.       Assessment:       1. Traumatic subarachnoid hematoma with loss of consciousness, with LOC of 30 min or less, subsequent encounter        Plan:       Migue was seen today for hospital follow up.    Diagnoses and all orders for this visit:    Traumatic subarachnoid hematoma with loss of consciousness, with LOC of 30 min or less, subsequent encounter  - stable  -Most of the 20 min visit was spent in discussing with pt's wife and son about his situation, that there is little to do about his occasional behavious that are frustrating for wife  without sedation that increased falls and not worth doing.  I believe his medical care is optimized at this point  -f/u with me q 6 mo and prn  Discussed importance of caregiver taking care of herself

## 2017-04-24 NOTE — MR AVS SNAPSHOT
[facility]  [address]  [facility] [address]  Phone: [phone]  Fax: [phone]                  Migue Fraser   2017 11:20 AM   Office Visit    Description:  Male : 1925   Provider:  Iggy Telles MD   Department:  [facility]           Reason for Visit     Hospital Follow Up                To Do List           Goals (5 Years of Data)     None      Follow-Up and Disposition     Return in about 6 months (around 10/24/2017) for htn.      [facility]     [facility] On Call Nurse Care Line -  Assistance  Unless otherwise directed by your provider, please contact [facility], our nurse care line that is available for  assistance.     Registered nurses in the [facility] On Call Center provide: appointment scheduling, clinical advisement, health education, and other advisory services.  Call: [phone] (toll free)               Medications           Message regarding Medications     Verify the changes and/or additions to your medication regime listed below are the same as discussed with your clinician today.  If any of these changes or additions are incorrect, please notify your healthcare provider.             Verify that the below list of medications is an accurate representation of the medications you are currently taking.  If none reported, the list may be blank. If incorrect, please contact your healthcare provider. Carry this list with you in case of emergency.           Current Medications     aspirin 81 MG Chew Take 1 tablet (81 mg total) by mouth once daily. Start taking this on the 3/27    escitalopram oxalate (LEXAPRO) 20 MG tablet Take 1 tablet (20 mg total) by mouth once daily.    latanoprost 0.005 % ophthalmic solution Place 1 drop into both eyes every evening.     melatonin 3 mg Tab Take by mouth.    metoprolol succinate (TOPROL-XL) 25 MG 24 hr tablet Take 1 tablet (25 mg total) by mouth once daily.    timolol  "maleate 0.5% (TIMOPTIC) 0.5 % Drop Place 1 drop into both eyes 2 (two) times daily.    tramadol (ULTRAM) 50 mg tablet Take 50 mg by mouth every 6 (six) hours as needed for Pain.     trazodone (DESYREL) 50 MG tablet take 1 tablet by mouth at bedtime if needed for insomnia           Clinical Reference Information           Your Vitals Were     BP Pulse Height Weight SpO2 BMI    120/60 67 5' 9" (1.753 m) 72 kg (158 lb 11.7 oz) 94% 23.44 kg/m2      Blood Pressure          Most Recent Value    BP  120/60      Allergies as of 4/24/2017     Penicillins      Immunizations Administered on Date of Encounter - 4/24/2017     None      [facility] Sign-Up     Activating your [facility] account is as easy as 1-2-3!     1) Visit [email or web address], select Sign Up Now, enter this activation code and your date of birth, then select Next.  [email or web address]  Expires: 5/6/2017 10:47 AM      2) Create a username and password to use when you visit [facility] in the future and select a security question in case you lose your password and select Next.    3) Enter your e-mail address and click Sign Up!    Additional Information  If you have questions, please e-mail [email or web address] or call [phone] to talk to our [facility] staff. Remember, [facility] is NOT to be used for urgent needs. For medical emergencies, dial 911.         Language Assistance Services     ATTENTION: Language assistance services are available, free of charge. Please call [phone].      ATENCIÓN: Si habla español, tiene a dillon disposición servicios gratuitos de asistencia lingüística. Llame al [phone].     JOHNSON Ý: N?u b?n nói Ti?ng Vi?t, có các d?ch v? h? tr? ngôn ng? mi?n phí dành cho b?n. G?i s? [phone].         [facility] complies with applicable Federal civil rights laws and does not discriminate on the basis of race, color, national origin, age, disability, or sex.        "

## 2017-05-03 ENCOUNTER — TELEPHONE (OUTPATIENT)
Dept: INTERNAL MEDICINE | Facility: CLINIC | Age: 82
End: 2017-05-03

## 2017-05-03 NOTE — TELEPHONE ENCOUNTER
----- Message from Maxine Haley sent at 5/3/2017  9:19 AM CDT -----  Contact: galo  daughter  [phone]  Galo is calling to set up a nurse visit appointment for her dads b 12 injection,   She would like to have the appointment on Friday may 05 in the late morning before lunch

## 2017-05-08 ENCOUNTER — TELEPHONE (OUTPATIENT)
Dept: INTERNAL MEDICINE | Facility: CLINIC | Age: 82
End: 2017-05-08

## 2017-05-08 NOTE — TELEPHONE ENCOUNTER
----- Message from Caty Arroyo sent at 5/5/2017 11:24 AM CDT -----  Contact: daughter/ galo/[phone]  Pt daughter called in regard to reschedule the pt b12 appointment for early weds after noon. Pt has a sleepless night and is sleep now.        Please advise

## 2017-05-10 ENCOUNTER — CLINICAL SUPPORT (OUTPATIENT)
Dept: INTERNAL MEDICINE | Facility: CLINIC | Age: 82
End: 2017-05-10
Payer: COMMERCIAL

## 2017-05-10 PROCEDURE — 96372 THER/PROPH/DIAG INJ SC/IM: CPT | Mod: S$GLB,,, | Performed by: FAMILY MEDICINE

## 2017-05-10 RX ADMIN — CYANOCOBALAMIN 1000 MCG: 1000 INJECTION, SOLUTION INTRAMUSCULAR; SUBCUTANEOUS at 01:05

## 2017-06-09 ENCOUNTER — CLINICAL SUPPORT (OUTPATIENT)
Dept: INTERNAL MEDICINE | Facility: CLINIC | Age: 82
End: 2017-06-09
Payer: COMMERCIAL

## 2017-06-09 PROCEDURE — 96372 THER/PROPH/DIAG INJ SC/IM: CPT | Mod: S$GLB,,, | Performed by: FAMILY MEDICINE

## 2017-06-09 RX ADMIN — CYANOCOBALAMIN 1000 MCG: 1000 INJECTION, SOLUTION INTRAMUSCULAR; SUBCUTANEOUS at 01:06

## 2017-07-14 ENCOUNTER — TELEPHONE (OUTPATIENT)
Dept: INTERNAL MEDICINE | Facility: CLINIC | Age: 82
End: 2017-07-14

## 2017-07-14 NOTE — TELEPHONE ENCOUNTER
----- Message from Boy Chaudhry sent at 7/13/2017  1:50 PM CDT -----  Contact: Prema Daughter [phone]  Want an apt schedule for her Dad's B12 Injection, advice    Thanks

## 2017-09-27 ENCOUNTER — TELEPHONE (OUTPATIENT)
Dept: INTERNAL MEDICINE | Facility: CLINIC | Age: 82
End: 2017-09-27

## 2017-09-27 NOTE — TELEPHONE ENCOUNTER
----- Message from Shelly Dacosta sent at 9/27/2017  8:33 AM CDT -----  Contact: Spouse/Rebekah [phone]  Patient has a possible bladder infection. Spouse would like to bring him in on Friday 09/29/2017 because the daughter will be in town to help assist in getting him here.    Please call and advise.    Thank You

## 2017-10-10 RX ORDER — TRAZODONE HYDROCHLORIDE 50 MG/1
TABLET ORAL
Qty: 30 TABLET | Refills: 5 | Status: SHIPPED | OUTPATIENT
Start: 2017-10-10 | End: 2018-04-13 | Stop reason: SDUPTHER

## 2017-11-06 DIAGNOSIS — F01.518 VASCULAR DEMENTIA WITH BEHAVIOR DISTURBANCE: ICD-10-CM

## 2017-11-06 RX ORDER — ESCITALOPRAM OXALATE 20 MG/1
TABLET ORAL
Qty: 30 TABLET | Refills: 11 | Status: SHIPPED | OUTPATIENT
Start: 2017-11-06

## 2017-11-10 ENCOUNTER — LAB VISIT (OUTPATIENT)
Dept: LAB | Facility: HOSPITAL | Age: 82
End: 2017-11-10
Attending: FAMILY MEDICINE
Payer: MEDICARE

## 2017-11-10 ENCOUNTER — OFFICE VISIT (OUTPATIENT)
Dept: INTERNAL MEDICINE | Facility: CLINIC | Age: 82
End: 2017-11-10
Payer: COMMERCIAL

## 2017-11-10 VITALS — SYSTOLIC BLOOD PRESSURE: 136 MMHG | HEIGHT: 69 IN | DIASTOLIC BLOOD PRESSURE: 66 MMHG | HEART RATE: 70 BPM

## 2017-11-10 DIAGNOSIS — R53.81 DEBILITY: ICD-10-CM

## 2017-11-10 DIAGNOSIS — R53.81 DEBILITY: Primary | ICD-10-CM

## 2017-11-10 DIAGNOSIS — R73.9 ELEVATED BLOOD SUGAR: ICD-10-CM

## 2017-11-10 LAB
ALBUMIN SERPL BCP-MCNC: 3.2 G/DL
ALP SERPL-CCNC: 90 U/L
ALT SERPL W/O P-5'-P-CCNC: 9 U/L
ANION GAP SERPL CALC-SCNC: 7 MMOL/L
AST SERPL-CCNC: 15 U/L
BASOPHILS # BLD AUTO: 0.03 K/UL
BASOPHILS NFR BLD: 0.5 %
BILIRUB SERPL-MCNC: 0.4 MG/DL
BUN SERPL-MCNC: 17 MG/DL
CALCIUM SERPL-MCNC: 9.6 MG/DL
CHLORIDE SERPL-SCNC: 102 MMOL/L
CO2 SERPL-SCNC: 30 MMOL/L
CREAT SERPL-MCNC: 1 MG/DL
DIFFERENTIAL METHOD: ABNORMAL
EOSINOPHIL # BLD AUTO: 0.1 K/UL
EOSINOPHIL NFR BLD: 1.6 %
ERYTHROCYTE [DISTWIDTH] IN BLOOD BY AUTOMATED COUNT: 12 %
EST. GFR  (AFRICAN AMERICAN): >60 ML/MIN/1.73 M^2
EST. GFR  (NON AFRICAN AMERICAN): >60 ML/MIN/1.73 M^2
ESTIMATED AVG GLUCOSE: 120 MG/DL
GLUCOSE SERPL-MCNC: 109 MG/DL
HBA1C MFR BLD HPLC: 5.8 %
HCT VFR BLD AUTO: 34.9 %
HGB BLD-MCNC: 11.6 G/DL
LYMPHOCYTES # BLD AUTO: 1.1 K/UL
LYMPHOCYTES NFR BLD: 20.1 %
MCH RBC QN AUTO: 34.1 PG
MCHC RBC AUTO-ENTMCNC: 33.2 G/DL
MCV RBC AUTO: 103 FL
MONOCYTES # BLD AUTO: 0.5 K/UL
MONOCYTES NFR BLD: 9.6 %
NEUTROPHILS # BLD AUTO: 3.8 K/UL
NEUTROPHILS NFR BLD: 67.8 %
NRBC BLD-RTO: 0 /100 WBC
PLATELET # BLD AUTO: 294 K/UL
PMV BLD AUTO: 9.4 FL
POTASSIUM SERPL-SCNC: 4.3 MMOL/L
PROT SERPL-MCNC: 7.5 G/DL
RBC # BLD AUTO: 3.4 M/UL
SODIUM SERPL-SCNC: 139 MMOL/L
TSH SERPL DL<=0.005 MIU/L-ACNC: 1.35 UIU/ML
VIT B12 SERPL-MCNC: 501 PG/ML
WBC # BLD AUTO: 5.53 K/UL

## 2017-11-10 PROCEDURE — 99999 PR PBB SHADOW E&M-EST. PATIENT-LVL III: CPT | Mod: PBBFAC,,, | Performed by: FAMILY MEDICINE

## 2017-11-10 PROCEDURE — 80053 COMPREHEN METABOLIC PANEL: CPT

## 2017-11-10 PROCEDURE — 36415 COLL VENOUS BLD VENIPUNCTURE: CPT

## 2017-11-10 PROCEDURE — 82607 VITAMIN B-12: CPT

## 2017-11-10 PROCEDURE — 83036 HEMOGLOBIN GLYCOSYLATED A1C: CPT

## 2017-11-10 PROCEDURE — 99214 OFFICE O/P EST MOD 30 MIN: CPT | Mod: S$GLB,,, | Performed by: FAMILY MEDICINE

## 2017-11-10 PROCEDURE — 85025 COMPLETE CBC W/AUTO DIFF WBC: CPT

## 2017-11-10 PROCEDURE — 84443 ASSAY THYROID STIM HORMONE: CPT

## 2017-11-10 NOTE — PROGRESS NOTES
Subjective:       Patient ID: Migue Fraser is a 89 y.o. male.    Chief Complaint: Follow-up    Patient is here for follow-up q 6 mo visit. Is deconditioned, in bed mostly all day, sometimes gets up to walk - would benefit form home PT for conditioning and safety check for walking [facility]    Review of Systems   Constitutional: Negative for chills and fatigue.   HENT: Negative for ear pain.    Eyes: Negative for pain.   Respiratory: Negative for chest tightness.    Cardiovascular: Negative for chest pain.   Gastrointestinal: Negative for abdominal pain.   Genitourinary: Negative for flank pain.   Musculoskeletal: Negative for gait problem.   Neurological: Negative for syncope.   Psychiatric/Behavioral: Negative for behavioral problems.       Objective:      Physical Exam   Constitutional: He appears well-developed.   HENT:   Head: Normocephalic and atraumatic.   Eyes: EOM are normal.   Neck: Neck supple.   Cardiovascular: Normal rate.    Pulmonary/Chest: Breath sounds normal. No respiratory distress. He has no wheezes. He has no rales.   Abdominal: Soft.   Musculoskeletal: He exhibits no edema.   Neurological: He is alert.   Skin: Skin is dry.   Psychiatric: His behavior is normal.       Assessment:       1. Debility    2. Elevated blood sugar        Plan:       Migue was seen today for follow-up.    Diagnoses and all orders for this visit:    Debility  -     Comprehensive metabolic panel; Future  -     TSH; Future  -     CBC auto differential; Future  -     Hemoglobin A1c; Future  -     Ambulatory referral to Home Health  -     Vitamin B12; Future    Elevated blood sugar  -     Hemoglobin A1c; Future    F/u q 6 mo  See cardiology annually (march)  See neurology if there are significant changes

## 2017-11-13 ENCOUNTER — TELEPHONE (OUTPATIENT)
Dept: ADMINISTRATIVE | Facility: CLINIC | Age: 82
End: 2017-11-13

## 2017-11-14 ENCOUNTER — PATIENT MESSAGE (OUTPATIENT)
Dept: INTERNAL MEDICINE | Facility: CLINIC | Age: 82
End: 2017-11-14

## 2017-11-14 DIAGNOSIS — D64.9 ANEMIA, UNSPECIFIED TYPE: Primary | ICD-10-CM

## 2017-11-14 NOTE — TELEPHONE ENCOUNTER
Set up blood tests ordered for 6-8 weeks from now - message or call family to find a good time - Sat is fine since no office visit neede    ====  It does not look like iron deficiency anemia, which would typically make his red cells small giving a very low MCV on the CBC. Instead his MCV is high as it has been for years. Sometimes this means he may be low in vit B-12 or folic acid - but his B-12 test was normal. It could also mean some bone marrow issues.     Let me ask my office to call or message you to set up a blood test in 6-8 weeks. It will be a CBC, iron, folic acid, and B-12. In the meantime, give him some daily folic acid and B-12 (even if it was normal) - e.g., B-12 1 gram = 1000 mcg over the counter, and pick the largest size over the counter folic acid tablet they have at the pharmacy - take one of each. I would avoid iron tablets for now since they can irritate the stomach and make him feel bad (soemtimes) and no real evidence form the MCV that he is low in iron.   dillan

## 2017-12-17 ENCOUNTER — HOSPITAL ENCOUNTER (INPATIENT)
Facility: HOSPITAL | Age: 82
LOS: 1 days | Discharge: HOME HEALTH | DRG: 179 | End: 2017-12-19
Attending: EMERGENCY MEDICINE | Admitting: EMERGENCY MEDICINE
Payer: MEDICARE

## 2017-12-17 DIAGNOSIS — J69.0 ASPIRATION PNEUMONIA OF RIGHT MIDDLE LOBE, UNSPECIFIED ASPIRATION PNEUMONIA TYPE: Primary | ICD-10-CM

## 2017-12-17 DIAGNOSIS — R11.10 EMESIS: ICD-10-CM

## 2017-12-17 LAB
ALBUMIN SERPL BCP-MCNC: 3.8 G/DL
ALP SERPL-CCNC: 74 U/L
ALT SERPL W/O P-5'-P-CCNC: 10 U/L
ANION GAP SERPL CALC-SCNC: 12 MMOL/L
AST SERPL-CCNC: 19 U/L
BASOPHILS # BLD AUTO: 0.02 K/UL
BASOPHILS NFR BLD: 0.3 %
BILIRUB SERPL-MCNC: 1.2 MG/DL
BUN SERPL-MCNC: 20 MG/DL
CALCIUM SERPL-MCNC: 9.2 MG/DL
CHLORIDE SERPL-SCNC: 103 MMOL/L
CO2 SERPL-SCNC: 25 MMOL/L
CREAT SERPL-MCNC: 0.9 MG/DL
DIFFERENTIAL METHOD: ABNORMAL
EOSINOPHIL # BLD AUTO: 0 K/UL
EOSINOPHIL NFR BLD: 0 %
ERYTHROCYTE [DISTWIDTH] IN BLOOD BY AUTOMATED COUNT: 12.8 %
EST. GFR  (AFRICAN AMERICAN): >60 ML/MIN/1.73 M^2
EST. GFR  (NON AFRICAN AMERICAN): >60 ML/MIN/1.73 M^2
GLUCOSE SERPL-MCNC: 164 MG/DL
HCT VFR BLD AUTO: 38.3 %
HGB BLD-MCNC: 13 G/DL
IMM GRANULOCYTES # BLD AUTO: 0.02 K/UL
IMM GRANULOCYTES NFR BLD AUTO: 0.3 %
INR PPP: 1.1
LIPASE SERPL-CCNC: 10 U/L
LYMPHOCYTES # BLD AUTO: 0.4 K/UL
LYMPHOCYTES NFR BLD: 4.8 %
MCH RBC QN AUTO: 33.9 PG
MCHC RBC AUTO-ENTMCNC: 33.9 G/DL
MCV RBC AUTO: 100 FL
MONOCYTES # BLD AUTO: 0.4 K/UL
MONOCYTES NFR BLD: 5 %
NEUTROPHILS # BLD AUTO: 7 K/UL
NEUTROPHILS NFR BLD: 89.6 %
NRBC BLD-RTO: 0 /100 WBC
PLATELET # BLD AUTO: 165 K/UL
PMV BLD AUTO: 9.4 FL
POTASSIUM SERPL-SCNC: 3.9 MMOL/L
PROT SERPL-MCNC: 7.4 G/DL
PROTHROMBIN TIME: 11.9 SEC
RBC # BLD AUTO: 3.83 M/UL
SODIUM SERPL-SCNC: 140 MMOL/L
TROPONIN I SERPL DL<=0.01 NG/ML-MCNC: 0.01 NG/ML
WBC # BLD AUTO: 7.77 K/UL

## 2017-12-17 PROCEDURE — 25500020 PHARM REV CODE 255: Performed by: EMERGENCY MEDICINE

## 2017-12-17 PROCEDURE — 63600175 PHARM REV CODE 636 W HCPCS: Performed by: STUDENT IN AN ORGANIZED HEALTH CARE EDUCATION/TRAINING PROGRAM

## 2017-12-17 PROCEDURE — 93005 ELECTROCARDIOGRAM TRACING: CPT

## 2017-12-17 PROCEDURE — 85025 COMPLETE CBC W/AUTO DIFF WBC: CPT

## 2017-12-17 PROCEDURE — 80053 COMPREHEN METABOLIC PANEL: CPT

## 2017-12-17 PROCEDURE — 25000003 PHARM REV CODE 250: Performed by: STUDENT IN AN ORGANIZED HEALTH CARE EDUCATION/TRAINING PROGRAM

## 2017-12-17 PROCEDURE — 83690 ASSAY OF LIPASE: CPT

## 2017-12-17 PROCEDURE — 93010 ELECTROCARDIOGRAM REPORT: CPT | Mod: ,,, | Performed by: INTERNAL MEDICINE

## 2017-12-17 PROCEDURE — 81001 URINALYSIS AUTO W/SCOPE: CPT

## 2017-12-17 PROCEDURE — 99285 EMERGENCY DEPT VISIT HI MDM: CPT | Mod: 25

## 2017-12-17 PROCEDURE — 96375 TX/PRO/DX INJ NEW DRUG ADDON: CPT

## 2017-12-17 PROCEDURE — 84484 ASSAY OF TROPONIN QUANT: CPT

## 2017-12-17 PROCEDURE — 99285 EMERGENCY DEPT VISIT HI MDM: CPT | Mod: ,,, | Performed by: EMERGENCY MEDICINE

## 2017-12-17 PROCEDURE — 96361 HYDRATE IV INFUSION ADD-ON: CPT

## 2017-12-17 PROCEDURE — 85610 PROTHROMBIN TIME: CPT

## 2017-12-17 PROCEDURE — 25000003 PHARM REV CODE 250: Performed by: EMERGENCY MEDICINE

## 2017-12-17 RX ORDER — METOPROLOL TARTRATE 25 MG/1
25 TABLET, FILM COATED ORAL
Status: COMPLETED | OUTPATIENT
Start: 2017-12-17 | End: 2017-12-17

## 2017-12-17 RX ORDER — METOPROLOL TARTRATE 1 MG/ML
5 INJECTION, SOLUTION INTRAVENOUS
Status: COMPLETED | OUTPATIENT
Start: 2017-12-17 | End: 2017-12-17

## 2017-12-17 RX ORDER — ONDANSETRON 2 MG/ML
4 INJECTION INTRAMUSCULAR; INTRAVENOUS
Status: COMPLETED | OUTPATIENT
Start: 2017-12-17 | End: 2017-12-17

## 2017-12-17 RX ORDER — SODIUM CHLORIDE 9 MG/ML
500 INJECTION, SOLUTION INTRAVENOUS
Status: COMPLETED | OUTPATIENT
Start: 2017-12-17 | End: 2017-12-18

## 2017-12-17 RX ADMIN — SODIUM CHLORIDE 500 ML: 0.9 INJECTION, SOLUTION INTRAVENOUS at 08:12

## 2017-12-17 RX ADMIN — SODIUM CHLORIDE 500 ML: 0.9 INJECTION, SOLUTION INTRAVENOUS at 10:12

## 2017-12-17 RX ADMIN — IOHEXOL 75 ML: 350 INJECTION, SOLUTION INTRAVENOUS at 11:12

## 2017-12-17 RX ADMIN — METOPROLOL TARTRATE 25 MG: 25 TABLET ORAL at 10:12

## 2017-12-17 RX ADMIN — ONDANSETRON 4 MG: 2 INJECTION INTRAMUSCULAR; INTRAVENOUS at 08:12

## 2017-12-17 RX ADMIN — METOROPROLOL TARTRATE 5 MG: 5 INJECTION, SOLUTION INTRAVENOUS at 08:12

## 2017-12-18 PROBLEM — H40.9 GLAUCOMA: Status: ACTIVE | Noted: 2017-12-18

## 2017-12-18 PROBLEM — Z78.9 FULL CODE STATUS: Status: ACTIVE | Noted: 2017-12-18

## 2017-12-18 PROBLEM — J69.0 ASPIRATION PNEUMONIA OF RIGHT MIDDLE LOBE: Status: ACTIVE | Noted: 2017-12-18

## 2017-12-18 LAB
ANION GAP SERPL CALC-SCNC: 7 MMOL/L
BACTERIA #/AREA URNS AUTO: NORMAL /HPF
BASOPHILS # BLD AUTO: 0.01 K/UL
BASOPHILS NFR BLD: 0.1 %
BILIRUB UR QL STRIP: NEGATIVE
BUN SERPL-MCNC: 20 MG/DL
CALCIUM SERPL-MCNC: 8.8 MG/DL
CHLORIDE SERPL-SCNC: 103 MMOL/L
CLARITY UR REFRACT.AUTO: CLEAR
CO2 SERPL-SCNC: 31 MMOL/L
COLOR UR AUTO: YELLOW
CREAT SERPL-MCNC: 0.9 MG/DL
DIFFERENTIAL METHOD: ABNORMAL
EOSINOPHIL # BLD AUTO: 0 K/UL
EOSINOPHIL NFR BLD: 0 %
ERYTHROCYTE [DISTWIDTH] IN BLOOD BY AUTOMATED COUNT: 13.2 %
EST. GFR  (AFRICAN AMERICAN): >60 ML/MIN/1.73 M^2
EST. GFR  (NON AFRICAN AMERICAN): >60 ML/MIN/1.73 M^2
FLUAV AG SPEC QL IA: NEGATIVE
FLUBV AG SPEC QL IA: NEGATIVE
GLUCOSE SERPL-MCNC: 125 MG/DL
GLUCOSE UR QL STRIP: NEGATIVE
HCT VFR BLD AUTO: 36.2 %
HGB BLD-MCNC: 12.1 G/DL
HGB UR QL STRIP: ABNORMAL
HYALINE CASTS UR QL AUTO: 0 /LPF
IMM GRANULOCYTES # BLD AUTO: 0.03 K/UL
IMM GRANULOCYTES NFR BLD AUTO: 0.4 %
KETONES UR QL STRIP: ABNORMAL
LACTATE SERPL-SCNC: 1.5 MMOL/L
LEUKOCYTE ESTERASE UR QL STRIP: NEGATIVE
LYMPHOCYTES # BLD AUTO: 0.7 K/UL
LYMPHOCYTES NFR BLD: 8.9 %
MCH RBC QN AUTO: 34.7 PG
MCHC RBC AUTO-ENTMCNC: 33.4 G/DL
MCV RBC AUTO: 104 FL
MICROSCOPIC COMMENT: NORMAL
MONOCYTES # BLD AUTO: 0.6 K/UL
MONOCYTES NFR BLD: 6.6 %
NEUTROPHILS # BLD AUTO: 7 K/UL
NEUTROPHILS NFR BLD: 84 %
NITRITE UR QL STRIP: NEGATIVE
NRBC BLD-RTO: 0 /100 WBC
PH UR STRIP: 5 [PH] (ref 5–8)
PLATELET # BLD AUTO: 152 K/UL
PMV BLD AUTO: 9.6 FL
POTASSIUM SERPL-SCNC: 4.2 MMOL/L
PROT UR QL STRIP: ABNORMAL
RBC # BLD AUTO: 3.49 M/UL
RBC #/AREA URNS AUTO: 2 /HPF (ref 0–4)
SODIUM SERPL-SCNC: 141 MMOL/L
SP GR UR STRIP: >1.03 (ref 1–1.03)
SPECIMEN SOURCE: NORMAL
URN SPEC COLLECT METH UR: ABNORMAL
UROBILINOGEN UR STRIP-ACNC: NEGATIVE EU/DL
WBC # BLD AUTO: 8.3 K/UL
WBC #/AREA URNS AUTO: 1 /HPF (ref 0–5)

## 2017-12-18 PROCEDURE — 25000242 PHARM REV CODE 250 ALT 637 W/ HCPCS: Performed by: STUDENT IN AN ORGANIZED HEALTH CARE EDUCATION/TRAINING PROGRAM

## 2017-12-18 PROCEDURE — G8996 SWALLOW CURRENT STATUS: HCPCS | Mod: CJ

## 2017-12-18 PROCEDURE — 80048 BASIC METABOLIC PNL TOTAL CA: CPT

## 2017-12-18 PROCEDURE — G8997 SWALLOW GOAL STATUS: HCPCS | Mod: CI

## 2017-12-18 PROCEDURE — 63600175 PHARM REV CODE 636 W HCPCS: Performed by: STUDENT IN AN ORGANIZED HEALTH CARE EDUCATION/TRAINING PROGRAM

## 2017-12-18 PROCEDURE — 87400 INFLUENZA A/B EACH AG IA: CPT | Mod: 59

## 2017-12-18 PROCEDURE — 25000003 PHARM REV CODE 250: Performed by: STUDENT IN AN ORGANIZED HEALTH CARE EDUCATION/TRAINING PROGRAM

## 2017-12-18 PROCEDURE — 85025 COMPLETE CBC W/AUTO DIFF WBC: CPT

## 2017-12-18 PROCEDURE — 11000001 HC ACUTE MED/SURG PRIVATE ROOM

## 2017-12-18 PROCEDURE — 99223 1ST HOSP IP/OBS HIGH 75: CPT | Mod: ,,, | Performed by: HOSPITALIST

## 2017-12-18 PROCEDURE — 87040 BLOOD CULTURE FOR BACTERIA: CPT | Mod: 59

## 2017-12-18 PROCEDURE — 94640 AIRWAY INHALATION TREATMENT: CPT

## 2017-12-18 PROCEDURE — 83605 ASSAY OF LACTIC ACID: CPT

## 2017-12-18 PROCEDURE — 96365 THER/PROPH/DIAG IV INF INIT: CPT

## 2017-12-18 PROCEDURE — 36415 COLL VENOUS BLD VENIPUNCTURE: CPT

## 2017-12-18 PROCEDURE — 92610 EVALUATE SWALLOWING FUNCTION: CPT

## 2017-12-18 PROCEDURE — S0077 INJECTION, CLINDAMYCIN PHOSP: HCPCS | Performed by: STUDENT IN AN ORGANIZED HEALTH CARE EDUCATION/TRAINING PROGRAM

## 2017-12-18 RX ORDER — IPRATROPIUM BROMIDE 0.5 MG/2.5ML
0.5 SOLUTION RESPIRATORY (INHALATION) EVERY 4 HOURS
Status: DISCONTINUED | OUTPATIENT
Start: 2017-12-18 | End: 2017-12-19 | Stop reason: HOSPADM

## 2017-12-18 RX ORDER — ACETAMINOPHEN 500 MG
500 TABLET ORAL EVERY 6 HOURS PRN
Status: DISCONTINUED | OUTPATIENT
Start: 2017-12-18 | End: 2017-12-19 | Stop reason: HOSPADM

## 2017-12-18 RX ORDER — LATANOPROST 50 UG/ML
1 SOLUTION/ DROPS OPHTHALMIC DAILY
Status: DISCONTINUED | OUTPATIENT
Start: 2017-12-18 | End: 2017-12-19 | Stop reason: HOSPADM

## 2017-12-18 RX ORDER — ONDANSETRON 2 MG/ML
4 INJECTION INTRAMUSCULAR; INTRAVENOUS EVERY 6 HOURS PRN
Status: DISCONTINUED | OUTPATIENT
Start: 2017-12-18 | End: 2017-12-19 | Stop reason: HOSPADM

## 2017-12-18 RX ORDER — CLINDAMYCIN PHOSPHATE 900 MG/50ML
900 INJECTION, SOLUTION INTRAVENOUS
Status: COMPLETED | OUTPATIENT
Start: 2017-12-18 | End: 2017-12-18

## 2017-12-18 RX ORDER — TIMOLOL MALEATE 5 MG/ML
1 SOLUTION/ DROPS OPHTHALMIC 2 TIMES DAILY
Status: DISCONTINUED | OUTPATIENT
Start: 2017-12-18 | End: 2017-12-19 | Stop reason: HOSPADM

## 2017-12-18 RX ORDER — MOXIFLOXACIN HYDROCHLORIDE 400 MG/1
400 TABLET ORAL DAILY
Status: DISCONTINUED | OUTPATIENT
Start: 2017-12-18 | End: 2017-12-19 | Stop reason: HOSPADM

## 2017-12-18 RX ORDER — METOPROLOL TARTRATE 25 MG/1
25 TABLET, FILM COATED ORAL 2 TIMES DAILY
Status: DISCONTINUED | OUTPATIENT
Start: 2017-12-18 | End: 2017-12-19 | Stop reason: HOSPADM

## 2017-12-18 RX ORDER — RAMELTEON 8 MG/1
8 TABLET ORAL NIGHTLY PRN
Status: DISCONTINUED | OUTPATIENT
Start: 2017-12-18 | End: 2017-12-19 | Stop reason: HOSPADM

## 2017-12-18 RX ORDER — HEPARIN SODIUM 5000 [USP'U]/ML
5000 INJECTION, SOLUTION INTRAVENOUS; SUBCUTANEOUS EVERY 8 HOURS
Status: DISCONTINUED | OUTPATIENT
Start: 2017-12-18 | End: 2017-12-19 | Stop reason: HOSPADM

## 2017-12-18 RX ORDER — NAPROXEN SODIUM 220 MG/1
81 TABLET, FILM COATED ORAL DAILY
Status: DISCONTINUED | OUTPATIENT
Start: 2017-12-18 | End: 2017-12-19 | Stop reason: HOSPADM

## 2017-12-18 RX ORDER — ESCITALOPRAM OXALATE 20 MG/1
20 TABLET ORAL DAILY
Status: DISCONTINUED | OUTPATIENT
Start: 2017-12-18 | End: 2017-12-19 | Stop reason: HOSPADM

## 2017-12-18 RX ORDER — PREDNISONE 20 MG/1
40 TABLET ORAL ONCE
Status: COMPLETED | OUTPATIENT
Start: 2017-12-18 | End: 2017-12-18

## 2017-12-18 RX ADMIN — PREDNISONE 40 MG: 20 TABLET ORAL at 04:12

## 2017-12-18 RX ADMIN — IPRATROPIUM BROMIDE 0.5 MG: 0.5 SOLUTION RESPIRATORY (INHALATION) at 08:12

## 2017-12-18 RX ADMIN — MOXIFLOXACIN HYDROCHLORIDE 400 MG: 400 TABLET, FILM COATED ORAL at 02:12

## 2017-12-18 RX ADMIN — IPRATROPIUM BROMIDE 0.5 MG: 0.5 SOLUTION RESPIRATORY (INHALATION) at 03:12

## 2017-12-18 RX ADMIN — CLINDAMYCIN IN 5 PERCENT DEXTROSE 900 MG: 18 INJECTION, SOLUTION INTRAVENOUS at 01:12

## 2017-12-18 RX ADMIN — METOPROLOL TARTRATE 25 MG: 25 TABLET, FILM COATED ORAL at 09:12

## 2017-12-18 RX ADMIN — IPRATROPIUM BROMIDE 0.5 MG: 0.5 SOLUTION RESPIRATORY (INHALATION) at 12:12

## 2017-12-18 RX ADMIN — IPRATROPIUM BROMIDE 0.5 MG: 0.5 SOLUTION RESPIRATORY (INHALATION) at 11:12

## 2017-12-18 RX ADMIN — HEPARIN SODIUM 5000 UNITS: 5000 INJECTION, SOLUTION INTRAVENOUS; SUBCUTANEOUS at 02:12

## 2017-12-18 RX ADMIN — HEPARIN SODIUM 5000 UNITS: 5000 INJECTION, SOLUTION INTRAVENOUS; SUBCUTANEOUS at 10:12

## 2017-12-18 RX ADMIN — TIMOLOL MALEATE 1 DROP: 5 SOLUTION OPHTHALMIC at 09:12

## 2017-12-18 RX ADMIN — TRAZODONE HYDROCHLORIDE 50 MG: 50 TABLET ORAL at 02:12

## 2017-12-18 RX ADMIN — HEPARIN SODIUM 5000 UNITS: 5000 INJECTION, SOLUTION INTRAVENOUS; SUBCUTANEOUS at 05:12

## 2017-12-18 NOTE — ASSESSMENT & PLAN NOTE
Likely 2/2 emesis this AM  Wife states possible subjective fever at home  Wife uncertain about penicillin allergy therefore will not give unasyn  C/w clindamycin for anearobic coverage  Pt w/ wheezes on exam therefore will give prednisone  Started on moxifloxacin  NPO for now  Speech/ swallow eval  F/u blood cx, pt unlikely to be able to provide sputum cx  On nasal cannula  F/u lactate  F/u influenza

## 2017-12-18 NOTE — PLAN OF CARE
Problem: SLP Goal  Goal: SLP Goal  Outcome: Ongoing (interventions implemented as appropriate)  Clinical evaluation of swallow complete. Recommend regular consistency diet and nectar thickened liquids. Pt requires close supervision with PO, assistance with meals, and assistance to thicken liquids.      AVRIL Gutierres, CCC-SLP  [phone]  12/18/2017

## 2017-12-18 NOTE — PT/OT/SLP EVAL
"Speech Language Pathology Evaluation  Bedside Swallow    Patient Name:  Migue Fraser   MRN:  [id number]   [id number]/[id number] A    Admitting Diagnosis: Aspiration pneumonia of right middle lobe    Recommendations:                 General Recommendations:  Dysphagia therapy  Diet recommendations:  Regular, Nectar Thick   Aspiration Precautions:   · Thicken all liquids to nectar thick consistency: 6oz: 1 packet thickener  · Pt requires close 1:1 supervision with PO  · Pt requires assistance to thicken liquids  · Pt requires assistance with meals  · NO straws  · PO meds crushed in pureed  · Intermittently check for buccal pocketing when eating   · Fully awake and alert for PO intake  · Fully upright position for PO intake  · Small bites/ sips  · Slow rate of eating/ drinking  · 1 bite/ sip @ a time  · Refrain from talking prior to swallow completion   · Remain upright for 20-30 min post PO intake  · Discontinue PO upon: coughing, throat clearing, choking, wet vocal quality, wet vocal quality, watery eyes, reddened facial features  General Precautions: Standard, aspiration, fall, nectar thick    History:     Past Medical History:   Diagnosis Date    A-fib     Atrial fibrillation     Blastomycosis     Cancer     prostate CA    Dementia     Glaucoma     Sick sinus syndrome        Past Surgical History:   Procedure Laterality Date    APPENDECTOMY      carpel tunner      CHOLECYSTECTOMY      HERNIA REPAIR      JOINT REPLACEMENT       Prior Intubation HX: No intubations this hospitalization.     Prior diet: Per pt's caretaker, regular consistency diet and thin liquids.     Subjective     "Check that out." Pt response upon request to accept PO trial. Pt pleasantly confused throughout evaluation.     Objective:     Oral Musculature Evaluation  · Oral Musculature: unable to assess due to poor participation/comprehension  · Dentition: present and adequate  · Volitional Cough: Weak, dry  · Volitional Swallow: RAYMOND 2/2 dec'd " comprehenion   · Voice Prior to PO Intake: Dry    Bedside Swallow Eval:   Consistencies Assessed:  · Thin water- tsp x2, multiple cup sips in isolation  · Nectar thickened water- multiple cup sips in isolation and as liquid wash  · Pureed apple sauce- multiple tsp  · Solid kiran cracker- bite x3    Oral Phase:   · WFL    Pharyngeal Phase:   · Wet vocal quality observed with thin liquids.   · No further overt clinical signs of aspiration observed with nectar thick, pureed, or solid.     Treatment:   Pt awake upon happen with caretaker at b/s. Majority of pt's verbalizations unintelligible this session. However when intelligible, pleasant confusion evident. Education provided re: role of SLP, definition/ risks/ overt clinical signs of aspiration, aspiration precautions listed above, and SLP POC. Caretaker verbalized understanding of all education provided and agreement with SLP POC. White board updated. No further questions.     Assessment:     Migue Fraser is a 89 y.o. male with an SLP diagnosis of dysphagia.     Goals:    SLP Goals        Problem: SLP Goal    Goal Priority Disciplines Outcome   SLP Goal     SLP Ongoing (interventions implemented as appropriate)   Description:  Speech Language Pathology  Goals expected to be met by 12/25:  1. Pt will tolerate nectar thickened liquids with regular consistency diet with no overt clinical signs of aspiration.   2. Pt will tolerate PO trials un-thickened water with no overt clinical signs of aspiration in order to determine if appropriate for liquid diet advancement.                     Plan:     · Patient to be seen:  5 x/week   · Plan of Care expires:  01/16/18  · Plan of Care reviewed with:  patient, caregiver   · SLP Follow-Up:  Yes       Discharge recommendations:   (Pending PT/ OT recs)     Time Tracking:     SLP Treatment Date:   12/11/17  Speech Start Time:  0744  Speech Stop Time:  0809     Speech Total Time (min):  25 min    Billable Minutes: Eval  Swallow and Oral Function 25    AVRIL Gutierres, CCC-SLP  [phone]  12/18/2017

## 2017-12-18 NOTE — ASSESSMENT & PLAN NOTE
Spoke to wife regarding code status and she stated no intubation, no artificial means. Asked wife clarification of artificial means as she wanted CPR but was not able to clarify further when propmted. Primary team to speak to wife later. For now form sign with all resuscitative measures except for intubation and ventilation. Wife in her 80s and has not yet had a chance to get any rest.

## 2017-12-18 NOTE — PLAN OF CARE
Problem: SLP Goal  Goal: SLP Goal  Outcome: Ongoing (interventions implemented as appropriate)  Clinical evaluation of swallow complete. Recommend regular consistency diet and nectar thickened liquids. Pt requires close supervision with PO, assistance with meals, and assistance to thicken liquids.     AVRIL Gutierres, CCC-SLP  [phone]  12/18/2017

## 2017-12-18 NOTE — ED TRIAGE NOTES
Patient received with complaint of vomiting since 0400 today.  Last emesis reported to be about 25 minutes ago.  Pt. Is reported to be weak and unsteady today.    IV in place on arrival to department.    Family/visitor is present.    Pain:  No pain reported.     Psychosocial:  Patient is calm and cooperative.  Appears clean, well maintained, with clothing appropriate to environment.  No evidence of hallucinations, delusions, or psychosis.    Neuro:  Eyes open spontaneously. Pt. Is disoriented to person, place, time, and situation. Pt. Has a hx of dementia.   Tolerating saliva secretions well.  Able to follow commands, demonstrating ability to actively and appropriately communicate within context of current conversation.  Symmetrical facial muscles.      Airway:  Bilateral chest rise and fall.  RR regular and non labored.  Air entry patent and coarse lung sounds noted to bilateral bases.      Circulatory:  Skin warm, dry, and pink. Atrial fibrillation noted.    Abdomen:  Abdomen soft and non distended.  Positive normo-active bowel sounds x 4 quadrants.    Urinary:  Pt. incontinent of urine.    Extremities:  No redness, heat, swelling, deformity, or pain.    Skin:  Intact with no bruising/discolorations noted.

## 2017-12-18 NOTE — PLAN OF CARE
Problem: Patient Care Overview  Goal: Plan of Care Review  Outcome: Ongoing (interventions implemented as appropriate)  Admitted to unit from Ed. Taiwo Cardenas at bedside w patient and she has minimal historical data. Mr. Camarena is pleasantly confused, in NAD although T/RR/HR are  elevated and some wheezing appreciated. MD here to assess and is writing orders. Oriented Ms. Cardenas to room and unit routines, his tentative POC etc. Unable to complete small sections of admit documentation due to the inability to get correct answers at this time. His wife will be back tomorrow. Will continue to monitor and follow orders as they are written.

## 2017-12-18 NOTE — ASSESSMENT & PLAN NOTE
On ASA only  Takes toprol-xl 25mg qd  Will use lopressor while inpt for better control in case pt's blood pressure becomes labile  ipratropium breathing treatments

## 2017-12-18 NOTE — SUBJECTIVE & OBJECTIVE
Past Medical History:   Diagnosis Date    A-fib     Atrial fibrillation     Blastomycosis     Cancer     prostate CA    Dementia     Glaucoma     Sick sinus syndrome        Past Surgical History:   Procedure Laterality Date    APPENDECTOMY      carpel tunner      CHOLECYSTECTOMY      HERNIA REPAIR      JOINT REPLACEMENT         Review of patient's allergies indicates:   Allergen Reactions    Penicillins        No current facility-administered medications on file prior to encounter.      Current Outpatient Prescriptions on File Prior to Encounter   Medication Sig    aspirin 81 MG Chew Take 1 tablet (81 mg total) by mouth once daily. Start taking this on the 3/27    escitalopram oxalate (LEXAPRO) 20 MG tablet take 1 tablet by mouth once daily    latanoprost 0.005 % ophthalmic solution Place 1 drop into both eyes once daily.     melatonin 3 mg Tab Take by mouth.    metoprolol succinate (TOPROL-XL) 25 MG 24 hr tablet Take 1 tablet (25 mg total) by mouth once daily.    timolol maleate 0.5% (TIMOPTIC) 0.5 % Drop Place 1 drop into both eyes 2 (two) times daily.    trazodone (DESYREL) 50 MG tablet take 1 tablet by mouth at bedtime if needed for insomnia    [DISCONTINUED] tramadol (ULTRAM) 50 mg tablet Take 50 mg by mouth every 6 (six) hours as needed for Pain.      Family History     None        Social History Main Topics    Smoking status: Never Smoker    Smokeless tobacco: Never Used    Alcohol use No    Drug use: No    Sexual activity: Not on file     Review of Systems   Unable to perform ROS: Dementia (patient stating yes to every question asked)     Objective:     Vital Signs (Most Recent):  Temp: 98.9 °F (37.2 °C) (12/18/17 0146)  Pulse: (!) 115 (12/18/17 0146)  Resp: (!) 22 (12/18/17 0146)  BP: 110/66 (12/18/17 0146)  SpO2: 95 % (12/18/17 0146) Vital Signs (24h Range):  Temp:  [98.6 °F (37 °C)-99.4 °F (37.4 °C)] 98.9 °F (37.2 °C)  Pulse:  [] 115  Resp:  [18-40] 22  SpO2:  [91 %-97 %]  95 %  BP: (101-134)/(62-87) 110/66        There is no height or weight on file to calculate BMI.    Physical Exam   Constitutional: No distress.   Cachectic appearing  Lying in bed with eyes closed   HENT:   Head: Normocephalic and atraumatic.   Eyes: Conjunctivae and EOM are normal. No scleral icterus.   Eye closed and unable to evaluate  Unable to open eyes, patient forcefully closing eyes   Neck:   Circular solid moveable nodule on neck ~ 1.5 x 1.5 inch  Per home aid present since before she met patient   Cardiovascular: Intact distal pulses.    Irregularly irregular   Pulmonary/Chest: No respiratory distress. He has wheezes. He has rales. He exhibits no tenderness.   Abdominal: Soft. He exhibits no distension. There is no tenderness. There is no rebound and no guarding.   Musculoskeletal: He exhibits no edema or tenderness.   Neurological:   intermittently somnolent and other times alert  Oriented x 1   Skin: Skin is warm. No rash noted.   Psychiatric:   demented         CRANIAL NERVES     CN III, IV, VI   Extraocular motions are normal.        Significant Labs:   CBC:   Recent Labs  Lab 12/17/17 1926   WBC 7.77   HGB 13.0*   HCT 38.3*        CMP:   Recent Labs  Lab 12/17/17 1926      K 3.9      CO2 25   *   BUN 20   CREATININE 0.9   CALCIUM 9.2   PROT 7.4   ALBUMIN 3.8   BILITOT 1.2*   ALKPHOS 74   AST 19   ALT 10   ANIONGAP 12   EGFRNONAA >60.0       Significant Imaging: CT: I have reviewed all pertinent results/findings within the past 24 hours and my personal findings are:  Peribronchial airspace disease in the right middle and lower lobes, likely aspiration or pneumonia. Moderate right pleural effusion. Nonspecific 2.3 cm hypodensity either adjacent to or within hepatic segment 2 at the medial aspect of the gastric cardia, favored to represent a simple hepatic cyst.

## 2017-12-18 NOTE — ASSESSMENT & PLAN NOTE
Had fall earlier this year  Per wife b/l knee replacements, however, pt still able to ambulate w/ guard assist  Has 24 hr private home aids

## 2017-12-18 NOTE — ED PROVIDER NOTES
Encounter Date: 12/17/2017       History   Patient is a 89-year-old F PMHx of Alzheimer's, A. fib, prostate cancer, glaucoma,  comes in presenting with emesis since 4 PM. Per wife patient has not been able to keep anything down.  Patient wife reports that she try to give the patient crackers and he immediately vomited that up as well. Wife states that he has been vomiting greenish to dark fluid approximately 5 times. Patient has Alzheimer's at baseline so was unable to fully assit in examination but stated he had no complaints currently. Pt on exam denied any Cp, SOB, headache, back pain, abdominal pain, weakness, dizziness, or dysuria.   Chief Complaint   Patient presents with    Vomiting     since 0400 today. Baseline confusion d/t dementia.      HPI  Review of patient's allergies indicates:   Allergen Reactions    Penicillins      Past Medical History:   Diagnosis Date    A-fib     Atrial fibrillation     Blastomycosis     Cancer     prostate CA    Glaucoma     Sick sinus syndrome      Past Surgical History:   Procedure Laterality Date    APPENDECTOMY      CHOLECYSTECTOMY      HERNIA REPAIR      JOINT REPLACEMENT       No family history on file.  Social History   Substance Use Topics    Smoking status: Never Smoker    Smokeless tobacco: Not on file    Alcohol use Yes      Comment: 1 drink a night     Review of Systems   Constitutional: Negative for fever.   HENT: Negative for sore throat.    Eyes: Negative for pain.   Respiratory: Negative for shortness of breath.    Cardiovascular: Negative for chest pain.   Gastrointestinal: Positive for nausea and vomiting.   Endocrine: Negative for polyuria.   Genitourinary: Negative for dysuria.   Musculoskeletal: Negative for back pain.   Skin: Negative for rash.   Neurological: Negative for weakness.   Hematological: Does not bruise/bleed easily.       Physical Exam     Initial Vitals [12/17/17 1844]   BP Pulse Resp Temp SpO2   134/87 96 18 98.6 °F (37 °C)  (!) 91 %      MAP       102.67         Physical Exam    Nursing note and vitals reviewed.  Constitutional: He appears well-developed and well-nourished. He is not diaphoretic. No distress.   HENT:   Head: Normocephalic and atraumatic.   Eyes: EOM are normal. Pupils are equal, round, and reactive to light.   Neck: Normal range of motion. Neck supple. No JVD present.   Cardiovascular: Exam reveals no friction rub.    Patient tachycardic on exam w/ rhythm of A. fib rates up to 116.   Pulmonary/Chest: Breath sounds normal. No respiratory distress. He has no wheezes.   Crackles notes a right upper lobe.  Patient pulse ox dropped to 90 while in room. Started 2 L nasal cannula    Abdominal: Soft. Bowel sounds are normal. He exhibits no distension. There is no tenderness. There is no rebound.   Musculoskeletal: Normal range of motion. He exhibits no edema or tenderness.   Neurological: He is alert. A cranial nerve deficit is present.   Patient oriented to self.  Patient has history.  Wife states his awareness waxes and when secondary to Alzheimer's.   Skin: Skin is warm and dry. Capillary refill takes 2 to 3 seconds.         ED Course   Procedures  Labs Reviewed   CBC W/ AUTO DIFFERENTIAL   COMPREHENSIVE METABOLIC PANEL   TROPONIN I   LIPASE   PROTIME-INR   URINALYSIS, REFLEX TO URINE CULTURE     EKG Readings: (Independently Interpreted)   Atrial fibrillation 100s, no e/o acute ischemia       X-Rays:   Independently Interpreted Readings:   Chest X-Ray: ? Rll pneumonia     Medical Decision Making:   Initial Assessment:   Patient is a 89-year-old F PMHx of Alzheimer's, A. fib, prostate cancer, glaucoma,  comes in presenting with emesis since 4 Pm.  At the anterior and examined the patient was noted to have right upper lobe crackles upon auscultation of lungs.  Abdomen is soft and nontender positive bowel sounds in all quadrants.  Patient tachycardic on exam with A. Fib, rate elevated between 116-120.  Will obtain basic labs,  troponin, chest x-ray, and administer dose of metoprolol here in the E.D. as the pt has not taken his home metoprolol dose.Pt also started on 2LNC as patient's pulse ox dropped while in the room however he remained comfortable the entire time.  Will continue to monitor patient dispo pending.    Hal Carson M.D.    Update:  Patient CT scan is returned positive for a right middle/lower lobe pneumonia likely secondary to aspiration.  Pt has a PCN allergy documented although unable to assess what the allergy is.  Pt's code status is unknown attempted to contact wife however after calling 3 times was unable to get in contact with her. Message was left on her voicemail. Patient will be admitted to inpatient medicine and started on clindamycin here in the ED.  Will continue to monitor.  Hal Carson M.D.  [employer] [facility]  PGY-1     Differential Diagnosis:   Ddx includes but not limited to: Gastroenteritis, peptic ulcer disease, small bowel exertion, hernia, pancreatitis, ACS.              Attending Attestation:   Physician Attestation Statement for Resident:  As the supervising MD   Physician Attestation Statement: I have personally seen and examined this patient.   I agree with the above history. -:   As the supervising MD I agree with the above PE.    As the supervising MD I agree with the above treatment, course, plan, and disposition.  I have reviewed and agree with the residents interpretation of the following: lab data, CT scans and EKG.                    ED Course      Clinical Impression:   Aspiration Pneumonia  Disposition:   Disposition: Admitted  Condition: Fair                        Hal Carson MD  Resident  12/21/17 2135       Katlyn Miller MD  12/30/17 2181

## 2017-12-18 NOTE — H&P
[facility] Hospital Medicine  History & Physical    Patient Name: Migue Fraser  MRN: [id number]  Admission Date: 12/17/2017  Attending Physician: Dr. Archibald  Primary Care Provider: Iggy Telles MD    [facility] Medicine Team: Networked reference to record PCT  Terry Hoyt MD     Patient information was obtained from spouse/SO, caregiver / friend, past medical records and ER records.     Subjective:     Principal Problem:Aspiration pneumonia of right middle lobe    Chief Complaint:   Chief Complaint   Patient presents with    Vomiting     since 0400 today. Baseline confusion d/t dementia.         HPI: 89 y/o M Pmhx Alzheimer's, A fib on ASA only, prostate cancer s/p treatment, glaucoma who presented w/ emesis since 4 AM. Per wife patient has not been able to keep anything down all day and stated emesis was blackish green in color. Patient himself stated yes to every question asked even when not an appropriate response. Wife endorsed possible subjective fever, as well as cough x 1-2 days. In addition, also states poor appetite x 1 month. She denied constipation, diarrhea, sick contacts, SOB until today.    Past Medical History:   Diagnosis Date    A-fib     Atrial fibrillation     Blastomycosis     Cancer     prostate CA    Dementia     Glaucoma     Sick sinus syndrome        Past Surgical History:   Procedure Laterality Date    APPENDECTOMY      carpel tunner      CHOLECYSTECTOMY      HERNIA REPAIR      JOINT REPLACEMENT         Review of patient's allergies indicates:   Allergen Reactions    Penicillins        No current facility-administered medications on file prior to encounter.      Current Outpatient Prescriptions on File Prior to Encounter   Medication Sig    aspirin 81 MG Chew Take 1 tablet (81 mg total) by mouth once daily. Start taking this on the 3/27    escitalopram oxalate (LEXAPRO) 20 MG tablet take 1 tablet by mouth once daily    latanoprost 0.005 %  ophthalmic solution Place 1 drop into both eyes once daily.     melatonin 3 mg Tab Take by mouth.    metoprolol succinate (TOPROL-XL) 25 MG 24 hr tablet Take 1 tablet (25 mg total) by mouth once daily.    timolol maleate 0.5% (TIMOPTIC) 0.5 % Drop Place 1 drop into both eyes 2 (two) times daily.    trazodone (DESYREL) 50 MG tablet take 1 tablet by mouth at bedtime if needed for insomnia    [DISCONTINUED] tramadol (ULTRAM) 50 mg tablet Take 50 mg by mouth every 6 (six) hours as needed for Pain.      Family History     None        Social History Main Topics    Smoking status: Never Smoker    Smokeless tobacco: Never Used    Alcohol use No    Drug use: No    Sexual activity: Not on file     Review of Systems   Unable to perform ROS: Dementia (patient stating yes to every question asked)     Objective:     Vital Signs (Most Recent):  Temp: 98.9 °F (37.2 °C) (12/18/17 0146)  Pulse: (!) 115 (12/18/17 0146)  Resp: (!) 22 (12/18/17 0146)  BP: 110/66 (12/18/17 0146)  SpO2: 95 % (12/18/17 0146) Vital Signs (24h Range):  Temp:  [98.6 °F (37 °C)-99.4 °F (37.4 °C)] 98.9 °F (37.2 °C)  Pulse:  [] 115  Resp:  [18-40] 22  SpO2:  [91 %-97 %] 95 %  BP: (101-134)/(62-87) 110/66        There is no height or weight on file to calculate BMI.    Physical Exam   Constitutional: No distress.   Cachectic appearing  Lying in bed with eyes closed   HENT:   Head: Normocephalic and atraumatic.   Eyes: Conjunctivae and EOM are normal. No scleral icterus.   Eye closed and unable to evaluate  Unable to open eyes, patient forcefully closing eyes   Neck:   Circular solid moveable nodule on neck ~ 1.5 x 1.5 inch  Per home aid present since before she met patient   Cardiovascular: Intact distal pulses.    Irregularly irregular   Pulmonary/Chest: No respiratory distress. He has wheezes. He has rales. He exhibits no tenderness.   Abdominal: Soft. He exhibits no distension. There is no tenderness. There is no rebound and no guarding.    Musculoskeletal: He exhibits no edema or tenderness.   Neurological:   intermittently somnolent and other times alert  Oriented x 1   Skin: Skin is warm. No rash noted.   Psychiatric:   demented         CRANIAL NERVES     CN III, IV, VI   Extraocular motions are normal.        Significant Labs:   CBC:   Recent Labs  Lab 12/17/17 1926   WBC 7.77   HGB 13.0*   HCT 38.3*        CMP:   Recent Labs  Lab 12/17/17 1926      K 3.9      CO2 25   *   BUN 20   CREATININE 0.9   CALCIUM 9.2   PROT 7.4   ALBUMIN 3.8   BILITOT 1.2*   ALKPHOS 74   AST 19   ALT 10   ANIONGAP 12   EGFRNONAA >60.0       Significant Imaging: CT: I have reviewed all pertinent results/findings within the past 24 hours and my personal findings are:  Peribronchial airspace disease in the right middle and lower lobes, likely aspiration or pneumonia. Moderate right pleural effusion. Nonspecific 2.3 cm hypodensity either adjacent to or within hepatic segment 2 at the medial aspect of the gastric cardia, favored to represent a simple hepatic cyst.    Assessment/Plan:     * Aspiration pneumonia of right middle lobe    Likely 2/2 emesis this AM  Wife states possible subjective fever at home  Wife uncertain about penicillin allergy therefore will not give unasyn  C/w clindamycin for anearobic coverage  Pt w/ wheezes on exam therefore will give prednisone  Started on moxifloxacin  NPO for now  Speech/ swallow eval  F/u blood cx, pt unlikely to be able to provide sputum cx  On nasal cannula  F/u lactate  F/u influenza        Glaucoma    C/w home eye drops        Full code status    Spoke to wife regarding code status and she stated no intubation, no artificial means. Asked wife clarification of artificial means as she wanted CPR but was not able to clarify further when propmted. Primary team to speak to wife later. For now form sign with all resuscitative measures except for intubation and ventilation. Wife in her 80s and has not yet  had a chance to get any rest.        Debility    Had fall earlier this year  Per wife b/l knee replacements, however, pt still able to ambulate w/ guard assist  Has 24 hr private home aids         Atrial fibrillation    On ASA only  Takes toprol-xl 25mg qd  Will use lopressor while inpt for better control in case pt's blood pressure becomes labile  ipratropium breathing treatments          VTE Risk Mitigation         Ordered     heparin (porcine) injection 5,000 Units  Every 8 hours     Route:  Subcutaneous        12/18/17 0253     Medium Risk of VTE  Once      12/18/17 0112             Terry Hoyt MD  Department of Hospital Medicine   [facility]                    12/18/2017                             STAFF PHYSICIAN NOTE                                   Attending Attestation for Rounds with Resident  I have reviewed and concur with the resident's history, physical, assessment, and plan.  I have personally interviewed and examined the patient at bedside and agree with the resident's findings.                                  ________________________________________                                     REASON FOR ADMISSION:     Patient is 89 y.o.male    There is no height or weight on file to calculate BMI.,  Aspiration pneumonia of right middle lobe

## 2017-12-18 NOTE — HPI
89 y/o M Pmhx Alzheimer's, A fib on ASA only, prostate cancer s/p treatment, glaucoma who presented w/ emesis since 4 AM. Per wife patient has not been able to keep anything down all day and stated emesis was blackish green in color. Patient himself stated yes to every question asked even when not an appropriate response. Wife endorsed possible subjective fever, as well as cough x 1-2 days. In addition, also states poor appetite x 1 month. She denied constipation, diarrhea, sick contacts, SOB until today.

## 2017-12-19 ENCOUNTER — TELEPHONE (OUTPATIENT)
Dept: INTERNAL MEDICINE | Facility: CLINIC | Age: 82
End: 2017-12-19

## 2017-12-19 VITALS
RESPIRATION RATE: 16 BRPM | SYSTOLIC BLOOD PRESSURE: 117 MMHG | DIASTOLIC BLOOD PRESSURE: 75 MMHG | OXYGEN SATURATION: 92 % | TEMPERATURE: 98 F | HEART RATE: 72 BPM

## 2017-12-19 LAB
ANION GAP SERPL CALC-SCNC: 10 MMOL/L
BASOPHILS # BLD AUTO: 0.01 K/UL
BASOPHILS NFR BLD: 0.1 %
BUN SERPL-MCNC: 28 MG/DL
CALCIUM SERPL-MCNC: 9.1 MG/DL
CHLORIDE SERPL-SCNC: 104 MMOL/L
CO2 SERPL-SCNC: 26 MMOL/L
CREAT SERPL-MCNC: 0.9 MG/DL
DIFFERENTIAL METHOD: ABNORMAL
EOSINOPHIL # BLD AUTO: 0 K/UL
EOSINOPHIL NFR BLD: 0 %
ERYTHROCYTE [DISTWIDTH] IN BLOOD BY AUTOMATED COUNT: 13.2 %
EST. GFR  (AFRICAN AMERICAN): >60 ML/MIN/1.73 M^2
EST. GFR  (NON AFRICAN AMERICAN): >60 ML/MIN/1.73 M^2
GLUCOSE SERPL-MCNC: 104 MG/DL
HCT VFR BLD AUTO: 34.5 %
HGB BLD-MCNC: 11.7 G/DL
IMM GRANULOCYTES # BLD AUTO: 0.02 K/UL
IMM GRANULOCYTES NFR BLD AUTO: 0.3 %
LYMPHOCYTES # BLD AUTO: 1.1 K/UL
LYMPHOCYTES NFR BLD: 15.9 %
MCH RBC QN AUTO: 34.8 PG
MCHC RBC AUTO-ENTMCNC: 33.9 G/DL
MCV RBC AUTO: 103 FL
MONOCYTES # BLD AUTO: 0.7 K/UL
MONOCYTES NFR BLD: 9.7 %
NEUTROPHILS # BLD AUTO: 5.1 K/UL
NEUTROPHILS NFR BLD: 74 %
NRBC BLD-RTO: 0 /100 WBC
PLATELET # BLD AUTO: 145 K/UL
PMV BLD AUTO: 10.2 FL
POTASSIUM SERPL-SCNC: 4 MMOL/L
RBC # BLD AUTO: 3.36 M/UL
SODIUM SERPL-SCNC: 140 MMOL/L
WBC # BLD AUTO: 6.93 K/UL

## 2017-12-19 PROCEDURE — 99239 HOSP IP/OBS DSCHRG MGMT >30: CPT | Mod: ,,, | Performed by: HOSPITALIST

## 2017-12-19 PROCEDURE — 25000003 PHARM REV CODE 250: Performed by: STUDENT IN AN ORGANIZED HEALTH CARE EDUCATION/TRAINING PROGRAM

## 2017-12-19 PROCEDURE — G8998 SWALLOW D/C STATUS: HCPCS | Mod: CI

## 2017-12-19 PROCEDURE — 36415 COLL VENOUS BLD VENIPUNCTURE: CPT

## 2017-12-19 PROCEDURE — 27000221 HC OXYGEN, UP TO 24 HOURS

## 2017-12-19 PROCEDURE — 80048 BASIC METABOLIC PNL TOTAL CA: CPT

## 2017-12-19 PROCEDURE — 94640 AIRWAY INHALATION TREATMENT: CPT

## 2017-12-19 PROCEDURE — 25000242 PHARM REV CODE 250 ALT 637 W/ HCPCS: Performed by: STUDENT IN AN ORGANIZED HEALTH CARE EDUCATION/TRAINING PROGRAM

## 2017-12-19 PROCEDURE — 85025 COMPLETE CBC W/AUTO DIFF WBC: CPT

## 2017-12-19 PROCEDURE — 94761 N-INVAS EAR/PLS OXIMETRY MLT: CPT

## 2017-12-19 PROCEDURE — 63600175 PHARM REV CODE 636 W HCPCS: Performed by: STUDENT IN AN ORGANIZED HEALTH CARE EDUCATION/TRAINING PROGRAM

## 2017-12-19 PROCEDURE — 92526 ORAL FUNCTION THERAPY: CPT

## 2017-12-19 RX ORDER — MOXIFLOXACIN HYDROCHLORIDE 400 MG/1
400 TABLET ORAL DAILY
Qty: 5 TABLET | Refills: 0 | Status: SHIPPED | OUTPATIENT
Start: 2017-12-19 | End: 2017-12-24

## 2017-12-19 RX ORDER — METOPROLOL TARTRATE 25 MG/1
25 TABLET, FILM COATED ORAL 2 TIMES DAILY
Qty: 60 TABLET | Refills: 3 | Status: SHIPPED | OUTPATIENT
Start: 2017-12-19 | End: 2018-04-13 | Stop reason: SDUPTHER

## 2017-12-19 RX ADMIN — ASPIRIN 81 MG CHEWABLE TABLET 81 MG: 81 TABLET CHEWABLE at 09:12

## 2017-12-19 RX ADMIN — MOXIFLOXACIN HYDROCHLORIDE 400 MG: 400 TABLET, FILM COATED ORAL at 09:12

## 2017-12-19 RX ADMIN — HEPARIN SODIUM 5000 UNITS: 5000 INJECTION, SOLUTION INTRAVENOUS; SUBCUTANEOUS at 05:12

## 2017-12-19 RX ADMIN — TIMOLOL MALEATE 1 DROP: 5 SOLUTION OPHTHALMIC at 09:12

## 2017-12-19 RX ADMIN — IPRATROPIUM BROMIDE 0.5 MG: 0.5 SOLUTION RESPIRATORY (INHALATION) at 08:12

## 2017-12-19 RX ADMIN — IPRATROPIUM BROMIDE 0.5 MG: 0.5 SOLUTION RESPIRATORY (INHALATION) at 03:12

## 2017-12-19 RX ADMIN — HEPARIN SODIUM 5000 UNITS: 5000 INJECTION, SOLUTION INTRAVENOUS; SUBCUTANEOUS at 01:12

## 2017-12-19 RX ADMIN — LATANOPROST 1 DROP: 50 SOLUTION OPHTHALMIC at 09:12

## 2017-12-19 RX ADMIN — METOPROLOL TARTRATE 25 MG: 25 TABLET, FILM COATED ORAL at 09:12

## 2017-12-19 RX ADMIN — IPRATROPIUM BROMIDE 0.5 MG: 0.5 SOLUTION RESPIRATORY (INHALATION) at 12:12

## 2017-12-19 RX ADMIN — ESCITALOPRAM 20 MG: 20 TABLET, FILM COATED ORAL at 09:12

## 2017-12-19 NOTE — HOSPITAL COURSE
Pt presented 12/17 with reports of emesis and cough, tachycardia (), and radiographic evidence of aspiration pneumonia. Pt was started on Moxifloxacin 400mg PO qd. Blood cultures negative. Influenza negative. Pt's vitals have remained stable. 12/19 Pt has been medically optimized and is ready for discharge home with Home Health. Pt will continue course of Moxi for aspiration pneumonia for a total of 7 days.

## 2017-12-19 NOTE — PLAN OF CARE
Extended Emergency Contact Information  Primary Emergency Contact: Rebekah Fraser  Address: [address] [place] [address]           [place] [address] [facility]  Home Phone: [phone]  Mobile Phone: [phone]  Relation: Spouse  Secondary Emergency Contact: Kristin Barrera   [facility]  Home Phone: [phone]  Relation: Daughter    Iggy Telles MD  [address] / [place] [address]     Future Appointments  Date Time Provider Department Center   1/19/2018 11:20 AM Iggy Telles MD [facility]       Payor: [facility] / Plan: [facility] CHOICE PLUS / Product Type: Commercial /        [facility] - [place], [place] - [address]  [address]  [place] [address]  Phone: [phone] Fax: [phone]       12/19/17 1415   Discharge Assessment   Assessment Type Discharge Planning Assessment   Assessment information obtained from? Medical Record   Expected Length of Stay (days) 1   Communicated expected length of stay with patient/caregiver no   Prior to hospitilization cognitive status: Not Oriented to Time;Not Oriented to Place   Prior to hospitalization functional status: Assistive Equipment;Needs Assistance   Current cognitive status: Not Oriented to Time;Not Oriented to Place   Current Functional Status: Assistive Equipment;Needs Assistance   Lives With spouse   Able to Return to Prior Arrangements yes   Is patient able to care for self after discharge? No   Patient's perception of discharge disposition home health   Readmission Within The Last 30 Days no previous admission in last 30 days   Patient currently being followed by outpatient case management? No   Patient currently receives any other outside agency services? No   Equipment Currently Used at Home cane, straight;walker, rolling   Do you have any problems affording any of your prescribed medications? TBD   Is the patient taking medications as prescribed? yes   Does  the patient have transportation home? Yes   Transportation Available family or friend will provide   Discharge Plan A Home with family;Home Health   Discharge Plan B Home with family   Patient/Family In Agreement With Plan unable to assess

## 2017-12-19 NOTE — PLAN OF CARE
[facility]    HOME HEALTH ORDERS  FACE TO FACE ENCOUNTER    Patient Name: Migue Fraser  YOB: 1925    PCP: Iggy Telles MD   PCP Address: [address] [facility] [address]  PCP Phone Number: [phone]  PCP Fax: [phone]    Encounter Date: 12/19/2017    Admit to Home Health    Diagnoses:  Active Hospital Problems    Diagnosis  POA    *Aspiration pneumonia of right middle lobe [J69.0]  Yes    Full code status [Z78.9]  Yes    Glaucoma [H40.9]  Yes    Debility [R53.81]  Yes    Atrial fibrillation [I48.91]  Yes      Resolved Hospital Problems    Diagnosis Date Resolved POA   No resolved problems to display.       No future appointments.  Follow-up Information     Iggy Telles MD.    Specialty:  Family Medicine  Contact information:  [address]  [facility] [id number] [phone]                     I have seen and examined this patient face to face today. My clinical findings that support the need for the home health skilled services and home bound status are the following:  Mental confusion making it unsafe for patient to leave home alone due to  Dementia.    Allergies:  Review of patient's allergies indicates:   Allergen Reactions    Penicillins        Diet: regular diet    Activities: activity as tolerated    Nursing:   SN to complete comprehensive assessment including routine vital signs. Instruct on disease process and s/s of complications to report to MD. Review/verify medication list sent home with the patient at time of discharge  and instruct patient/caregiver as needed. Frequency may be adjusted depending on start of care date.    Notify MD if SBP > 160 or < 90; DBP > 90 or < 50; HR > 120 or < 50; Temp > 101      CONSULTS:    Physical Therapy to evaluate and treat. Evaluate for home safety and equipment needs; Establish/upgrade home exercise program. Perform / instruct on therapeutic exercises, gait training, transfer training,  and Range of Motion.  Occupational Therapy to evaluate and treat. Evaluate home environment for safety and equipment needs. Perform/Instruct on transfers, ADL training, ROM, and therapeutic exercises.      Medications: Review discharge medications with patient and family and provide education.      Current Discharge Medication List      START taking these medications    Details   metoprolol tartrate (LOPRESSOR) 25 MG tablet Take 1 tablet (25 mg total) by mouth 2 (two) times daily.  Qty: 60 tablet, Refills: 3      moxifloxacin (AVELOX) 400 mg tablet Take 1 tablet (400 mg total) by mouth once daily.  Qty: 5 tablet, Refills: 0         CONTINUE these medications which have NOT CHANGED    Details   aspirin 81 MG Chew Take 1 tablet (81 mg total) by mouth once daily. 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       melatonin 3 mg Tab Take by mouth.      timolol maleate 0.5% (TIMOPTIC) 0.5 % Drop Place 1 drop into both eyes 2 (two) times daily.  Refills: 1      trazodone (DESYREL) 50 MG tablet take 1 tablet by mouth at bedtime if needed for insomnia  Qty: 30 tablet, Refills: 5             I certify that this patient is confined to his home and needs intermittent skilled nursing care, physical therapy and occupational therapy.

## 2017-12-19 NOTE — PT/OT/SLP PROGRESS
"Speech Language Pathology Treatment    Patient Name:  Migue Fraser   MRN:  [id number]   942/942 A  Admitting Diagnosis: Aspiration pneumonia of right middle lobe    Recommendations:                 General Recommendations:  Dysphagia therapy  Diet recommendations:  Regular, Liquid Diet Level: Thin   Aspiration Precautions:   · Pt requires close 1:1 supervision with PO  · Pt requires assistance with meals  · NO straws  · PO meds crushed in pureed  · Intermittently check for buccal pocketing when eating   · Fully awake and alert for PO intake  · Fully upright position for PO intake  · Small bites/ sips  · Slow rate of eating/ drinking  · 1 bite/ sip @ a time  · Refrain from talking prior to swallow completion   · Remain upright for 20-30 min post PO intake  · Discontinue PO upon: coughing, throat clearing, choking, wet vocal quality, wet vocal quality, watery eyes, reddened facial features  General Precautions: Standard, aspiration, fall    Subjective     "I don't know what to do." Pt response when clinician provided him with bite kiran cracker in order to observe diet tolerance.     Pain/Comfort:  · Pain Rating 1: 0/10  · Pain Rating Post-Intervention 1: 0/10    Objective:     Has the patient been evaluated by SLP for swallowing?   Yes  Keep patient NPO? No   Current Respiratory Status: room air      Pt reclined in b/s chair asleep upon entry. Daughter and her  present. Education provided to daughter and her  re: role of SLP, SLP POC per results of evaluation completed yesterday, and definition/ risks/ overt clinical signs of aspiration.  Pt awakened with gentle verbal and tactile stimulation. Back of chair raised. Pt demonstrating pleasant confusion throughout session. Although he refused PO trials when provided by clinician, accepted PO trials provided by daughter. Pt observed with multiple cup sips of water and iced tea, bites kiran cracker x2-3, and tsp dental soft peaches in syrup x~5. Oral " phase appeared WFL, as mastication and oral transit timely with adequate oral clearance. No overt clinical signs of aspiration observed this date compared to wet vocal quality observed with thin liquids during yesterday's evaluation. However silent aspiration unable to be ruled out. Education provided re: updated SLP POC and aspiration precautions listed above. Pt's daughter and son verbalized understanding of all education provided and agreement with SLP POC. White board updated. No further questions.     Assessment:     Migue Fraser is a 89 y.o. male with an SLP diagnosis of risk of aspiration 2/2 medical diagnoses and presbyphagia.     Goals:    SLP Goals        Problem: SLP Goal    Goal Priority Disciplines Outcome   SLP Goal     SLP Ongoing (interventions implemented as appropriate)   Description:  Speech Language Pathology  Goals expected to be met by 12/25:  1. Pt will tolerate nectar thickened liquids with regular consistency diet with no overt clinical signs of aspiration.   2. Pt will tolerate PO trials un-thickened water with no overt clinical signs of aspiration in order to determine if appropriate for liquid diet advancement.                     Plan:     · Patient to be seen:  5 x/week   · Plan of Care expires:  01/16/18  · Plan of Care reviewed with:  patient, daughter   · SLP Follow-Up:  Yes       Discharge recommendations:  home with home health     Time Tracking:     SLP Treatment Date:   12/19/17  Speech Start Time:  1319  Speech Stop Time:  1342     Speech Total Time (min):  23 min    Billable Minutes: Treatment Swallowing Dysfunction 23    AVRIL Gutierres, CCC-SLP  [phone]  12/19/2017

## 2017-12-19 NOTE — PLAN OF CARE
Problem: Patient Care Overview  Goal: Plan of Care Review  Outcome: Ongoing (interventions implemented as appropriate)  Safety:  call light in reach, patient oriented to room & instructed how to notify nurse if assistance is needed, questions & concerns addressed, bed in lowest position with wheels locked & side rails up X 2, fall precautions followed, patient free from fall & injury thus far this shift;  VTE/bleeding precautions maintained.  Activity:  patient up with assistance, weight shifted at least every other hour.  Neurological:  Patient alert, easily roused and pleasantly confused.  Respiratory:  patient tolerates room air without distress, denies SOB.  Cardiac:  Denies chest pain, BP stable.  HR stable. Afebrile this shift.  GI:  Patient tolerates PO intake well, denies nausea, LBM 12/17/17.  :  patient voids clear yellow urine without foul odor spontaneously & without difficulty into brief, vol. Adequate for shift. Skin:  CDI.  Devices:  PIV CDI, negative for s/sx of infection & infiltration.  Pain:  patient denies pain.  Will continue to monitor patient. Sitter remains at bedside.

## 2017-12-19 NOTE — PT/OT/SLP EVAL
Physical Therapy Evaluation    Patient Name:  Migue Fraser   MRN:  [id number]    Recommendations:     Discharge Recommendations:  home with home health   Discharge Equipment Recommendations: none   Barriers to discharge: None    Assessment:     Migue Fraser is a 89 y.o. male admitted with a medical diagnosis of Aspiration pneumonia of right middle lobe.  He presents with the following impairments/functional limitations:  impaired cognition, impaired self care skills, impaired functional mobilty, gait instability, impaired balance, decreased upper extremity function, decreased lower extremity function, decreased safety awareness. Patient at baseline mentation and near baseline level of function per family. To benefit from continued skilled intervention to address deficits with mobility prior to transition home with HH therapy services, 24-hr sitter, and family care.    Rehab Prognosis:  Good; patient would benefit from acute skilled PT services to address these deficits and reach maximum level of function.      Recent Surgery: * No surgery found *      Plan:     During this hospitalization, patient to be seen 3 x/week to address the above listed problems via gait training, therapeutic activities, neuromuscular re-education  · Plan of Care Expires:  01/19/18   Plan of Care Reviewed with: patient, caregiver    Subjective     Communicated with RN prior to session.  Patient found supine with family present upon PT entry to room, agreeable to evaluation.      Chief Complaint: none stated  Patient comments/goals: none stated  Pain/Comfort:  · Pain Rating 1: 0/10  · Pain Rating Post-Intervention 1: 0/10    Patients cultural, spiritual, Taoist conflicts given the current situation: none stated    Living Environment:  Per family; patient lives with family in 2-story home with 5 [address]ION BHRs with bed/bath on first floor. Has 24-hr sitter and family care to assist with sponge baths and dressing. Family states  someone is always with patient.  Prior to admission, patients level of function was needing assistance from sitter and family as needed.  Patient has the following equipment: cane, straight, walker, rolling.  DME owned (not currently used): rolling walker and single point cane.  Upon discharge, patient will have assistance from family and 24-hr sitter.    Objective:     Patient found with:   no lines    General Precautions: Standard, fall, nectar thick, aspiration   Orthopedic Precautions:N/A   Braces: N/A     Exams:  · Cognitive Exam:  Patient is oriented to not orient to place, time or situation. Responds when name is called and follows 75% of one-step commands   · Gross Motor Coordination:  impaired with ambulation requiring CGA/HHA  · Postural Exam:  Patient presented with the following abnormalities:    · -       Rounded shoulders  · -       Forward head  · -       Kyphosis  · Sensation:    · -       Intact  light/touch BLE  · Skin Integrity/Edema:      · -       Skin integrity: Visible skin intact  · RLE ROM: WFL except decreased knee extension ROM  · RLE Strength: WFL  · LLE ROM: WFL except decreased knee extension ROM  · LLE Strength: WFL    Functional Mobility:  · Bed Mobility:     · Rolling Left:  moderate assistance  · Supine to Sit: moderate assistance  · Transfers:     · Sit to Stand:  contact guard assistance with no AD  · Bed to Chair: minimum assistance with  no AD  using  Stand Pivot  · Gait: 80ftx1 CGA/HHA without device. Short, shuffling steps with decreased foot clearance noted bilaterally. per family this is patient's gait pattern PTA  · Balance: impaired static standing and dynamic standing balance requiring CGA/HHA for functional mobility assessment.    AM-PAC 6 CLICK MOBILITY  Total Score:16       Therapeutic Activities and Exercises:   Patient educated on:   - role of PT/POC    - safety with all functional mobility   - bed mobility training   - transfer training   - gait training without  device   - importance of participation with therapy services   - safest to ambulate with HHA of 1 person assist at this time.    Family verbalized understanding of all education provided. Patient without evidence of learning.  Additional education provided to family on need for 24-hr supervision of patient to decrease fall risk. Verbalized understanding stating someone is always with patient.    PCT notified to notify RN about family question's regarding patient's diet.    Patient with fatigue with ambulation therefore unable to attempt stair negotiation. Family reports they will assist patient up stairs.    Patient left up in chair with all lines intact, call button in reach, PCT notified and family present.    GOALS:    Physical Therapy Goals        Problem: Physical Therapy Goal    Goal Priority Disciplines Outcome Goal Variances Interventions   Physical Therapy Goal     PT/OT, PT Ongoing (interventions implemented as appropriate)     Description:  Goals to be met by: 17     Patient will increase functional independence with mobility by performin. Supine to sit with MInimal Assistance  2. Sit to supine with MInimal Assistance  3. Sit to stand transfer with Stand-by Assistance  4. Bed to chair transfer with Contact Guard Assistance  5. Gait  x 100 feet with Minimal Assistance  6. Ascend/descend 5 stair with bilateral Handrails Moderate Assistance.                      History:     Past Medical History:   Diagnosis Date    A-fib     Atrial fibrillation     Blastomycosis     Cancer     prostate CA    Dementia     Glaucoma     Sick sinus syndrome        Past Surgical History:   Procedure Laterality Date    APPENDECTOMY      carpel tunner      CHOLECYSTECTOMY      HERNIA REPAIR      JOINT REPLACEMENT         Clinical Decision Making:     History  Co-morbidities and personal factors that may impact the plan of care Examination  Body Structures and Functions, activity limitations and  participation restrictions that may impact the plan of care Clinical Presentation   Decision Making/ Complexity Score   Co-morbidities:   [] Time since onset of injury / illness / exacerbation  [] Status of current condition  [x]Patient's cognitive status and safety concerns    [x] Multiple Medical Problems (see med hx)  Personal Factors:   [x] Patient's age  [] Prior Level of function   [] Patient's home situation (environment and family support)  [] Patient's level of motivation  [x] Expected progression of patient      HISTORY:(criteria)    [] 62168 - no personal factors/history    [] 06771 - has 1-2 personal factor/comorbidity     [x] 30696 - has >3 personal factor/comorbidity     Body Regions:  [] Objective examination findings  [] Head     []  Neck  [] Trunk   [] Upper Extremity  [] Lower Extremity    Body Systems:  [x] For communication ability, affect, cognition, language, and learning style: the assessment of the ability to make needs known, consciousness, orientation (person, place, and time), expected emotional /behavioral responses, and learning preferences (eg, learning barriers, education  needs)  [x] For the neuromuscular system: a general assessment of gross coordinated movement (eg, balance, gait, locomotion, transfers, and transitions) and motor function  (motor control and motor learning)  [x] For the musculoskeletal system: the assessment of gross symmetry, gross range of motion, gross strength, height, and weight  [] For the integumentary system: the assessment of pliability(texture), presence of scar formation, skin color, and skin integrity  [] For cardiovascular/pulmonary system: the assessment of heart rate, respiratory rate, blood pressure, and edema     Activity limitations:    [] Patient's cognitive status and saf ety concerns          [] Status of current condition      [] Weight bearing restriction  [] Cardiopulmunary Restriction    Participation Restrictions:   [] Goals and goal  agreement with the patient     [] Rehab potential (prognosis) and probable outcome      Examination of Body System: (criteria)    [] 49923 - addressing 1-2 elements    [x] 77757 - addressing a total of 3 or more elements     [] 64536 -  Addressing a total of 4 or more elements         Clinical Presentation: (criteria)  Stable - 74533     On examination of body system using standardized tests and measures patient presents with 3 or more elements from any of the following: body structures and functions, activity limitations, and/or participation restrictions.  Leading to a clinical presentation that is considered stable and/or uncomplicated                              Clinical Decision Making  (Eval Complexity):  Low- 63790     Time Tracking:     PT Received On: 12/19/17  PT Start Time: 1137     PT Stop Time: 1153  PT Total Time (min): 16 min     Billable Minutes: Evaluation 16      Otilio Wild III, PT  12/19/2017

## 2017-12-19 NOTE — PLAN OF CARE
Problem: SLP Goal  Goal: SLP Goal  Speech Language Pathology  Goals expected to be met by 12/25:  1. Pt will tolerate nectar thickened liquids with regular consistency diet with no overt clinical signs of aspiration.   2. Pt will tolerate PO trials un-thickened water with no overt clinical signs of aspiration in order to determine if appropriate for liquid diet advancement.    Outcome: Ongoing (interventions implemented as appropriate)  Recommend liquid diet advancement to thin liquids, STRICT aspiration precautions. Remain on regular consistencies.     AVRIL Gutierres, CCC-SLP  [phone]  12/19/2017

## 2017-12-19 NOTE — TELEPHONE ENCOUNTER
----- Message from Caty Thakur sent at 12/19/2017  1:48 PM CST -----  Hospital called to schedule a f/u as pt is being discharged today 12/19/17. Your first available is 1/19/18 which I scheduled. If you want to see this patient sooner, please try to fit him in sooner unless you don't feel he needs to be seen.

## 2017-12-19 NOTE — PLAN OF CARE
Problem: Fall Risk (Adult)  Goal: Identify Related Risk Factors and Signs and Symptoms  Related risk factors and signs and symptoms are identified upon initiation of Human Response Clinical Practice Guideline (CPG)   Outcome: Ongoing (interventions implemented as appropriate)  Personal sitter at bedside, bed alarm on, frequent patient rounding done, instructed to remain in bed.

## 2017-12-19 NOTE — PROGRESS NOTES
AVS d/c instructions given to patient family, voices understanding. PIV d/senait with cath intact, gauze dressing applied. Waiting for speech therapy to complete diet teaching with family.

## 2017-12-19 NOTE — PLAN OF CARE
Problem: Pressure Ulcer Risk (Dm Scale) (Adult,Obstetrics,Pediatric)  Goal: Identify Related Risk Factors and Signs and Symptoms  Related risk factors and signs and symptoms are identified upon initiation of Human Response Clinical Practice Guideline (CPG)   Outcome: Outcome(s) achieved Date Met: 12/18/17  Patient remain bed bound, confused, skin remain intact, redness to left buttocks noted, skin barrier applied PRN, diaper changed, skin remain dry.

## 2017-12-19 NOTE — PLAN OF CARE
Problem: Physical Therapy Goal  Goal: Physical Therapy Goal  Goals to be met by: 17     Patient will increase functional independence with mobility by performin. Supine to sit with MInimal Assistance  2. Sit to supine with MInimal Assistance  3. Sit to stand transfer with Stand-by Assistance  4. Bed to chair transfer with Contact Guard Assistance  5. Gait  x 100 feet with Minimal Assistance  6. Ascend/descend 5 stair with bilateral Handrails Moderate Assistance.    Outcome: Ongoing (interventions implemented as appropriate)  Evaluation complete.  Goals established to improve functional mobility.    DC rec's for  therapy services    Otilio Wild III, DONYT, PT  2017

## 2017-12-20 ENCOUNTER — PATIENT OUTREACH (OUTPATIENT)
Dept: ADMINISTRATIVE | Facility: CLINIC | Age: 82
End: 2017-12-20

## 2017-12-20 NOTE — TELEPHONE ENCOUNTER
Please call and inform pt that Dr. Fuentes is available to see him on 12/22 @ 1300. appt already scheduled.

## 2017-12-20 NOTE — PROGRESS NOTES
C3 nurse attempted to contact patient. The following occurred:   C3 nurse attempted to contact Migue Fraser  for a TCC post hospital discharge follow up call. The patient is unable to conduct the call @ this time. The patient requested a callback.    The patient has a scheduled HOSFU appointment with Iggy Telles MD  on 1/19/17 @ 1120. Message sent to Physician staff.

## 2017-12-23 LAB
BACTERIA BLD CULT: NORMAL
BACTERIA BLD CULT: NORMAL

## 2017-12-26 NOTE — PT/OT/SLP DISCHARGE
Physical Therapy Discharge Summary    Name: Migue Fraser  MRN: [id number]   Principal Problem: Aspiration pneumonia of right middle lobe     Patient Discharged from acute Physical Therapy on 17.  Please refer to prior PT noted date on 17 for functional status.     Assessment:     Patient appropriate for care in another setting.    Objective:     GOALS:    Physical Therapy Goals        Problem: Physical Therapy Goal    Goal Priority Disciplines Outcome Goal Variances Interventions   Physical Therapy Goal     PT/OT, PT Ongoing (interventions implemented as appropriate)     Description:  Goals to be met by: 17     Patient will increase functional independence with mobility by performin. Supine to sit with MInimal Assistance  2. Sit to supine with MInimal Assistance  3. Sit to stand transfer with Stand-by Assistance  4. Bed to chair transfer with Contact Guard Assistance  5. Gait  x 100 feet with Minimal Assistance  6. Ascend/descend 5 stair with bilateral Handrails Moderate Assistance.                      Reasons for Discontinuation of Therapy Services  Transfer to alternate level of care.      Plan:     Patient Discharged to: Home with Home Health Service.    Otilio Wlid III, PT  2017

## 2018-01-02 ENCOUNTER — TELEPHONE (OUTPATIENT)
Dept: ADMINISTRATIVE | Facility: CLINIC | Age: 83
End: 2018-01-02

## 2018-01-18 ENCOUNTER — PATIENT MESSAGE (OUTPATIENT)
Dept: INTERNAL MEDICINE | Facility: CLINIC | Age: 83
End: 2018-01-18

## 2018-02-02 ENCOUNTER — TELEPHONE (OUTPATIENT)
Dept: INTERNAL MEDICINE | Facility: CLINIC | Age: 83
End: 2018-02-02

## 2018-02-02 ENCOUNTER — PATIENT MESSAGE (OUTPATIENT)
Dept: INTERNAL MEDICINE | Facility: CLINIC | Age: 83
End: 2018-02-02

## 2018-02-02 NOTE — TELEPHONE ENCOUNTER
----- Message from Kenia Washington sent at 2/2/2018 12:23 PM CST -----  Contact: wife Rebekah [phone]  Pt wife Rebekah is requesting a call back from office. Please advise, Thanks.

## 2018-02-05 ENCOUNTER — LAB VISIT (OUTPATIENT)
Dept: LAB | Facility: HOSPITAL | Age: 83
End: 2018-02-05
Attending: FAMILY MEDICINE
Payer: MEDICARE

## 2018-02-05 ENCOUNTER — OFFICE VISIT (OUTPATIENT)
Dept: INTERNAL MEDICINE | Facility: CLINIC | Age: 83
End: 2018-02-05
Payer: MEDICARE

## 2018-02-05 VITALS
HEART RATE: 60 BPM | BODY MASS INDEX: 23.32 KG/M2 | HEIGHT: 69 IN | DIASTOLIC BLOOD PRESSURE: 60 MMHG | SYSTOLIC BLOOD PRESSURE: 115 MMHG | WEIGHT: 157.44 LBS | OXYGEN SATURATION: 99 %

## 2018-02-05 DIAGNOSIS — R53.81 DEBILITY: ICD-10-CM

## 2018-02-05 DIAGNOSIS — I48.0 PAROXYSMAL ATRIAL FIBRILLATION: ICD-10-CM

## 2018-02-05 DIAGNOSIS — D64.9 ANEMIA, UNSPECIFIED TYPE: Primary | ICD-10-CM

## 2018-02-05 DIAGNOSIS — D64.9 ANEMIA, UNSPECIFIED TYPE: ICD-10-CM

## 2018-02-05 DIAGNOSIS — Z01.89 ENCOUNTER FOR COMPETENCY EVALUATION: ICD-10-CM

## 2018-02-05 DIAGNOSIS — R73.9 ELEVATED BLOOD SUGAR: ICD-10-CM

## 2018-02-05 DIAGNOSIS — I49.5 SICK SINUS SYNDROME: ICD-10-CM

## 2018-02-05 PROBLEM — Z91.81 HISTORY OF FALLING: Status: ACTIVE | Noted: 2017-03-29

## 2018-02-05 PROBLEM — S06.2X0A: Status: RESOLVED | Noted: 2017-03-11 | Resolved: 2018-02-05

## 2018-02-05 PROBLEM — Z86.79 HISTORY OF SUBARACHNOID HEMORRHAGE: Status: ACTIVE | Noted: 2017-03-13

## 2018-02-05 PROBLEM — S06.33AA CEREBRAL CONTUSION: Status: RESOLVED | Noted: 2017-03-11 | Resolved: 2018-02-05

## 2018-02-05 PROBLEM — J69.0 ASPIRATION PNEUMONIA OF RIGHT MIDDLE LOBE: Status: RESOLVED | Noted: 2017-12-18 | Resolved: 2018-02-05

## 2018-02-05 PROBLEM — S06.6X9A TRAUMATIC SUBARACHNOID HEMATOMA WITH LOSS OF CONSCIOUSNESS: Status: RESOLVED | Noted: 2017-03-11 | Resolved: 2018-02-05

## 2018-02-05 PROBLEM — Z86.79 HISTORY OF SUBDURAL HEMATOMA: Status: ACTIVE | Noted: 2017-03-29

## 2018-02-05 LAB
BASOPHILS # BLD AUTO: 0.02 K/UL
BASOPHILS NFR BLD: 0.5 %
DIFFERENTIAL METHOD: ABNORMAL
EOSINOPHIL # BLD AUTO: 0.1 K/UL
EOSINOPHIL NFR BLD: 2.2 %
ERYTHROCYTE [DISTWIDTH] IN BLOOD BY AUTOMATED COUNT: 13.6 %
ESTIMATED AVG GLUCOSE: 105 MG/DL
FOLATE SERPL-MCNC: 5.9 NG/ML
HBA1C MFR BLD HPLC: 5.3 %
HCT VFR BLD AUTO: 37.8 %
HGB BLD-MCNC: 12.5 G/DL
IRON SERPL-MCNC: 99 UG/DL
LYMPHOCYTES # BLD AUTO: 1.1 K/UL
LYMPHOCYTES NFR BLD: 27 %
MCH RBC QN AUTO: 34.2 PG
MCHC RBC AUTO-ENTMCNC: 33.1 G/DL
MCV RBC AUTO: 103 FL
MONOCYTES # BLD AUTO: 0.5 K/UL
MONOCYTES NFR BLD: 11.4 %
NEUTROPHILS # BLD AUTO: 2.4 K/UL
NEUTROPHILS NFR BLD: 58.9 %
PLATELET # BLD AUTO: 155 K/UL
PMV BLD AUTO: 9.7 FL
RBC # BLD AUTO: 3.66 M/UL
SATURATED IRON: 31 %
TOTAL IRON BINDING CAPACITY: 321 UG/DL
TRANSFERRIN SERPL-MCNC: 217 MG/DL
VIT B12 SERPL-MCNC: >2000 PG/ML
WBC # BLD AUTO: 4.04 K/UL

## 2018-02-05 PROCEDURE — 82746 ASSAY OF FOLIC ACID SERUM: CPT

## 2018-02-05 PROCEDURE — 1126F AMNT PAIN NOTED NONE PRSNT: CPT | Mod: S$GLB,,, | Performed by: FAMILY MEDICINE

## 2018-02-05 PROCEDURE — 99499 UNLISTED E&M SERVICE: CPT | Mod: S$GLB,,, | Performed by: FAMILY MEDICINE

## 2018-02-05 PROCEDURE — 99214 OFFICE O/P EST MOD 30 MIN: CPT | Mod: S$GLB,,, | Performed by: FAMILY MEDICINE

## 2018-02-05 PROCEDURE — 36415 COLL VENOUS BLD VENIPUNCTURE: CPT

## 2018-02-05 PROCEDURE — 83036 HEMOGLOBIN GLYCOSYLATED A1C: CPT

## 2018-02-05 PROCEDURE — 99999 PR PBB SHADOW E&M-EST. PATIENT-LVL IV: CPT | Mod: PBBFAC,,, | Performed by: FAMILY MEDICINE

## 2018-02-05 PROCEDURE — 1159F MED LIST DOCD IN RCRD: CPT | Mod: S$GLB,,, | Performed by: FAMILY MEDICINE

## 2018-02-05 PROCEDURE — 82607 VITAMIN B-12: CPT

## 2018-02-05 PROCEDURE — 83540 ASSAY OF IRON: CPT

## 2018-02-05 PROCEDURE — 85025 COMPLETE CBC W/AUTO DIFF WBC: CPT

## 2018-02-05 PROCEDURE — 99214 OFFICE O/P EST MOD 30 MIN: CPT | Mod: PBBFAC | Performed by: FAMILY MEDICINE

## 2018-02-05 NOTE — PROGRESS NOTES
"Subjective:       Patient ID: Migue Fraser is a 89 y.o. male.    Chief Complaint: Hospital Follow Up  "Hospital Course:   Pt presented 12/17 with reports of emesis and cough, tachycardia (), and radiographic evidence of aspiration pneumonia. Pt was started on Moxifloxacin 400mg PO qd. Blood cultures negative. Influenza negative. Pt's vitals have remained stable. 12/19 Pt has been medically optimized and is ready for discharge home with Home Health. Pt will continue course of Moxi for aspiration pneumonia for a total of 7 days. "  He is now feeling back to normal, no breathing problmes      Patient is here for hospital f/u. His wife and daughter contacted me last week and asked for a letter:  "I am following up on a request my stepmother ,   Rebekah Fraser, made on the phone today.   She is requesting a letter from Dr. Telles  stating   that her  (my father) is not capable of making g   decisions handling his affairs.   She already has a standard POA but this will   activate it for medical insurance affairs, etc.   We will be in the office Mon Feb 5th for 11:20 AM   for an appointment.     Thank you so much for your attention and  assistance   to this matter.   Prema Fraser "    Review of Systems   Constitutional: Negative for chills and fatigue.   HENT: Negative for ear pain.    Eyes: Negative for pain.   Respiratory: Negative for chest tightness.    Cardiovascular: Negative for chest pain.   Gastrointestinal: Negative for abdominal pain.   Genitourinary: Negative for flank pain.   Musculoskeletal: Negative for gait problem.   Neurological: Negative for syncope.   Psychiatric/Behavioral: Negative for behavioral problems.       Objective:      Physical Exam   Constitutional: He appears well-developed.   HENT:   Head: Normocephalic and atraumatic.   Eyes: EOM are normal.   Neck: Neck supple.   Cardiovascular: Normal rate.    Pulmonary/Chest: Breath sounds normal. No respiratory distress. He has " "no wheezes. He has no rales.   Abdominal: Soft.   Musculoskeletal: He exhibits no edema.   Neurological: He is alert.   Skin: Skin is dry.   Psychiatric: His behavior is normal.       Assessment:       1. Anemia, unspecified type    2. Elevated blood sugar    3. Paroxysmal atrial fibrillation    4. Sick sinus syndrome    5. Debility    6. Encounter for competency evaluation        Plan:       Migue was seen today for hospital follow up.    Diagnoses and all orders for this visit:    Anemia, unspecified type  -     CBC auto differential; Future  -     Folate; Future  -     Vitamin B12; Future  -     Iron and TIBC; Future    Elevated blood sugar  -     Hemoglobin A1c; Future  F/u 4 mo for hgba1c    Paroxysmal atrial fibrillation  -stable    Sick sinus syndrome  -stable    Debility  -stable    Encounter for competency evaluation  Patient is here for hospital f/u. His wife and daughter contacted me last week and asked for a letter:  "I am following up on a request my stepmother ,   Rebekah Fraser, made on the phone today.   She is requesting a letter from Dr. Telles  stating   that her  (my father) is not capable of making g   decisions handling his affairs.   She already has a standard POA but this will   activate it for medical insurance affairs, etc.   We will be in the office Mon Feb 5th for 11:20 AM   for an appointment.     Thank you so much for your attention and  assistance   to this matter.   Prema Fraser "  After review of of his medical situation, I did write the following letter:   "To Whom It May Concern:  It is my medical opinion that Migue Fraser is not medically able to handle his affairs.  If you have any questions or concerns, please don't hesitate to call.  Sincerely,"        "

## 2018-03-25 ENCOUNTER — HOSPITAL ENCOUNTER (EMERGENCY)
Facility: HOSPITAL | Age: 83
Discharge: HOME | End: 2018-03-25
Attending: EMERGENCY MEDICINE
Payer: MEDICARE

## 2018-03-25 VITALS
SYSTOLIC BLOOD PRESSURE: 145 MMHG | HEIGHT: 69 IN | BODY MASS INDEX: 23.7 KG/M2 | TEMPERATURE: 98 F | HEART RATE: 84 BPM | RESPIRATION RATE: 20 BRPM | WEIGHT: 160 LBS | DIASTOLIC BLOOD PRESSURE: 65 MMHG | OXYGEN SATURATION: 95 %

## 2018-03-25 DIAGNOSIS — M79.641 HAND PAIN, RIGHT: ICD-10-CM

## 2018-03-25 DIAGNOSIS — I48.91 ATRIAL FIBRILLATION WITH CONTROLLED VENTRICULAR RATE: Primary | ICD-10-CM

## 2018-03-25 DIAGNOSIS — F03.90 DEMENTIA WITHOUT BEHAVIORAL DISTURBANCE, UNSPECIFIED DEMENTIA TYPE: ICD-10-CM

## 2018-03-25 DIAGNOSIS — R53.1 WEAKNESS: ICD-10-CM

## 2018-03-25 LAB
ALBUMIN SERPL BCP-MCNC: 3.2 G/DL
ALP SERPL-CCNC: 88 U/L
ALT SERPL W/O P-5'-P-CCNC: 8 U/L
ANION GAP SERPL CALC-SCNC: 9 MMOL/L
AST SERPL-CCNC: 12 U/L
BACTERIA #/AREA URNS AUTO: NORMAL /HPF
BASOPHILS # BLD AUTO: 0.05 K/UL
BASOPHILS NFR BLD: 0.8 %
BILIRUB SERPL-MCNC: 1.1 MG/DL
BILIRUB UR QL STRIP: NEGATIVE
BNP SERPL-MCNC: 264 PG/ML
BUN SERPL-MCNC: 19 MG/DL
CALCIUM SERPL-MCNC: 9.6 MG/DL
CHLORIDE SERPL-SCNC: 102 MMOL/L
CLARITY UR REFRACT.AUTO: CLEAR
CO2 SERPL-SCNC: 28 MMOL/L
COLOR UR AUTO: YELLOW
CREAT SERPL-MCNC: 0.9 MG/DL
DIFFERENTIAL METHOD: ABNORMAL
EOSINOPHIL # BLD AUTO: 0.1 K/UL
EOSINOPHIL NFR BLD: 1.1 %
ERYTHROCYTE [DISTWIDTH] IN BLOOD BY AUTOMATED COUNT: 12 %
EST. GFR  (AFRICAN AMERICAN): >60 ML/MIN/1.73 M^2
EST. GFR  (NON AFRICAN AMERICAN): >60 ML/MIN/1.73 M^2
GLUCOSE SERPL-MCNC: 115 MG/DL
GLUCOSE UR QL STRIP: NEGATIVE
HCT VFR BLD AUTO: 36.2 %
HGB BLD-MCNC: 12.4 G/DL
HGB UR QL STRIP: NEGATIVE
HYALINE CASTS UR QL AUTO: 0 /LPF
IMM GRANULOCYTES # BLD AUTO: 0.02 K/UL
IMM GRANULOCYTES NFR BLD AUTO: 0.3 %
INR PPP: 1.1
KETONES UR QL STRIP: NEGATIVE
LEUKOCYTE ESTERASE UR QL STRIP: NEGATIVE
LYMPHOCYTES # BLD AUTO: 1.1 K/UL
LYMPHOCYTES NFR BLD: 17.4 %
MCH RBC QN AUTO: 34.3 PG
MCHC RBC AUTO-ENTMCNC: 34.3 G/DL
MCV RBC AUTO: 100 FL
MICROSCOPIC COMMENT: NORMAL
MONOCYTES # BLD AUTO: 0.8 K/UL
MONOCYTES NFR BLD: 12.2 %
NEUTROPHILS # BLD AUTO: 4.2 K/UL
NEUTROPHILS NFR BLD: 68.2 %
NITRITE UR QL STRIP: NEGATIVE
NRBC BLD-RTO: 0 /100 WBC
PH UR STRIP: 5 [PH] (ref 5–8)
PLATELET # BLD AUTO: 182 K/UL
PMV BLD AUTO: 9.4 FL
POCT GLUCOSE: 112 MG/DL (ref 70–110)
POTASSIUM SERPL-SCNC: 4 MMOL/L
PROT SERPL-MCNC: 7.2 G/DL
PROT UR QL STRIP: ABNORMAL
PROTHROMBIN TIME: 11.5 SEC
RBC # BLD AUTO: 3.61 M/UL
RBC #/AREA URNS AUTO: 0 /HPF (ref 0–4)
SODIUM SERPL-SCNC: 139 MMOL/L
SP GR UR STRIP: 1.02 (ref 1–1.03)
SQUAMOUS #/AREA URNS AUTO: 0 /HPF
TROPONIN I SERPL DL<=0.01 NG/ML-MCNC: 0.02 NG/ML
TSH SERPL DL<=0.005 MIU/L-ACNC: 1.26 UIU/ML
URN SPEC COLLECT METH UR: ABNORMAL
UROBILINOGEN UR STRIP-ACNC: 4 EU/DL
WBC # BLD AUTO: 6.16 K/UL
WBC #/AREA URNS AUTO: 1 /HPF (ref 0–5)

## 2018-03-25 PROCEDURE — 80053 COMPREHEN METABOLIC PANEL: CPT

## 2018-03-25 PROCEDURE — 85025 COMPLETE CBC W/AUTO DIFF WBC: CPT

## 2018-03-25 PROCEDURE — 99285 EMERGENCY DEPT VISIT HI MDM: CPT | Mod: ,,, | Performed by: EMERGENCY MEDICINE

## 2018-03-25 PROCEDURE — 96361 HYDRATE IV INFUSION ADD-ON: CPT

## 2018-03-25 PROCEDURE — 93005 ELECTROCARDIOGRAM TRACING: CPT

## 2018-03-25 PROCEDURE — 82962 GLUCOSE BLOOD TEST: CPT

## 2018-03-25 PROCEDURE — 99285 EMERGENCY DEPT VISIT HI MDM: CPT | Mod: 25

## 2018-03-25 PROCEDURE — 25000003 PHARM REV CODE 250: Performed by: EMERGENCY MEDICINE

## 2018-03-25 PROCEDURE — 81001 URINALYSIS AUTO W/SCOPE: CPT

## 2018-03-25 PROCEDURE — 96360 HYDRATION IV INFUSION INIT: CPT

## 2018-03-25 PROCEDURE — 93010 ELECTROCARDIOGRAM REPORT: CPT | Mod: ,,, | Performed by: INTERNAL MEDICINE

## 2018-03-25 PROCEDURE — 85610 PROTHROMBIN TIME: CPT

## 2018-03-25 PROCEDURE — 84484 ASSAY OF TROPONIN QUANT: CPT

## 2018-03-25 PROCEDURE — 84443 ASSAY THYROID STIM HORMONE: CPT

## 2018-03-25 PROCEDURE — 83880 ASSAY OF NATRIURETIC PEPTIDE: CPT

## 2018-03-25 RX ORDER — IBUPROFEN 600 MG/1
600 TABLET ORAL
Status: COMPLETED | OUTPATIENT
Start: 2018-03-25 | End: 2018-03-25

## 2018-03-25 RX ORDER — SODIUM CHLORIDE 9 MG/ML
1000 INJECTION, SOLUTION INTRAVENOUS
Status: COMPLETED | OUTPATIENT
Start: 2018-03-25 | End: 2018-03-25

## 2018-03-25 RX ADMIN — SODIUM CHLORIDE 1000 ML: 0.9 INJECTION, SOLUTION INTRAVENOUS at 10:03

## 2018-03-25 RX ADMIN — IBUPROFEN 600 MG: 600 TABLET, FILM COATED ORAL at 01:03

## 2018-03-25 NOTE — ED PROVIDER NOTES
"Encounter Date: 3/25/2018    SCRIBE #1 NOTE: I, Kenia Martínez, am scribing for, and in the presence of,  Dr. Lindsay. I have scribed the entire note.       History     Chief Complaint   Patient presents with    Arm Pain     Swelling to the right hand. No injury or fall. hx of dementia     Time seen by provider: 10:04 AM    This is a 89 y.o. male with medical conditions including A-fib, cancer, blastomycosis, and dementia (diagnosed about a year ago) presents to the ED with complaint of generalized weakness and right hand pain. Pt's spouse reports generalized weakness started on Wednesday but cannot recall onset of hand pain. Pt experiences difficulty getting up to go to the bathroom and has had to be spoon fed for past couple of weeks due to weakness. Pt's hand is tender to palpation.  Family is unsure whether or not he hit it against something.  Associated symptoms include a random jumping of the body described as "jerking". Pt "jerks" random times throughout the day. "jerking" was seen by caregiver when eating and sleeping.  Wife states intermittently will not take his medications as he just spits them out.    History and ROS obtained by family.      The history is provided by the spouse and a caregiver.     Review of patient's allergies indicates:   Allergen Reactions    Penicillins      Past Medical History:   Diagnosis Date    A-fib     Atrial fibrillation     Blastomycosis     Cancer     prostate CA    Dementia     Glaucoma     Sick sinus syndrome      Past Surgical History:   Procedure Laterality Date    APPENDECTOMY      carpel tunner      CHOLECYSTECTOMY      HERNIA REPAIR      JOINT REPLACEMENT       No family history on file.  Social History   Substance Use Topics    Smoking status: Never Smoker    Smokeless tobacco: Never Used    Alcohol use No     Review of Systems   Constitutional: Positive for activity change (needs assistance when eating) and fatigue (generalized weakness). "   HENT: Negative.    Respiratory: Negative.    Cardiovascular: Negative for chest pain, palpitations and leg swelling.   Gastrointestinal: Negative for constipation, diarrhea, nausea and vomiting.   Genitourinary: Negative for difficulty urinating, dysuria, enuresis and flank pain.   Musculoskeletal:        Right hand tenderness     Neurological: Positive for weakness. Negative for dizziness, facial asymmetry, light-headedness and headaches. Speech difficulty: talking limited.   Psychiatric/Behavioral: Positive for confusion. The patient is not nervous/anxious and is not hyperactive.    All other systems reviewed and are negative.      Physical Exam     Initial Vitals [03/25/18 0955]   BP Pulse Resp Temp SpO2   132/80 77 18 97.7 °F (36.5 °C) 96 %      MAP       97.33         Physical Exam  Vital signs and nursing assessment noted:    GEN: NAD, A & disoriented, atraumatic, well appearing, nontoxic appearing  HEENT: PERRLA, EOMI, dry mucus membranes, nl conjunctiva, no scleral icterus, no nystagmus, no nodes/nodules  Neck: supple, FROM, no trachial deviation, nexus negative  CV: Irregular no m/r/g, 2+ radial pulses, <2sec cap refill, no obvious JVD  RESP: CTA B, no w/r/r, equal and bilateral chest rise, no respiratory distress  ABD: soft, Nontender, Nondistended, +BS, no guarding/rebound  BACK: FROM, no midline tenderness, no paraspinal tenderness  : Deferred  EXT: FROM, RONQUILLO x 4, no edema, no calf tenderness, swelling of the base of the right thumb with tenderness  LYMPH: no gross adenopathy  NEURO: limited secondary to cooperation however grossly cranial nerves II through XII grossly intact, no obvious sensory deficits, motor assessment difficult secondary to cooperation however moving all extremities ×4  PSYCH:  Unable to assess  SKIN:Warm, dry, intact, no rashes/lesions or masses, pallor  ED Course   Procedures  Labs Reviewed   CBC W/ AUTO DIFFERENTIAL - Abnormal; Notable for the following:        Result Value     RBC 3.61 (*)     Hemoglobin 12.4 (*)     Hematocrit 36.2 (*)      (*)     MCH 34.3 (*)     Lymph% 17.4 (*)     All other components within normal limits   COMPREHENSIVE METABOLIC PANEL - Abnormal; Notable for the following:     Glucose 115 (*)     Albumin 3.2 (*)     Total Bilirubin 1.1 (*)     ALT 8 (*)     All other components within normal limits   B-TYPE NATRIURETIC PEPTIDE - Abnormal; Notable for the following:      (*)     All other components within normal limits   URINALYSIS, REFLEX TO URINE CULTURE - Abnormal; Notable for the following:     Protein, UA 1+ (*)     All other components within normal limits    Narrative:     Preferred Collection Type->Urine, Clean Catch   POCT GLUCOSE - Abnormal; Notable for the following:     POCT Glucose 112 (*)     All other components within normal limits   PROTIME-INR   TSH   TROPONIN I   URINALYSIS MICROSCOPIC    Narrative:     Preferred Collection Type->Urine, Clean Catch   POCT GLUCOSE             Medical Decision Making:   History:   Old Medical Records: I decided to obtain old medical records.  Initial Assessment:   89-year-old male past medical history dementia and atrial fibrillation presents with generalized weakness ×5 days.  Differential Diagnosis:     Weakness differential includes but not exclusive to: anemia, metabolic disturbances, CVA, poisoning, medication side effect, UTI, myocardial infarction, dehydration, peripheral nerve disorder, progression of patient's dementia, malingering  Ext pain differential includes but not exclusive to: sprain/strain, fracture, contusion, hematoma, cellulitis, abscess, tendonitis, arthritis    Independently Interpreted Test(s):   I have ordered and independently interpreted X-rays - see summary below.       <> Summary of X-Ray Reading(s): CT head does not show any acute abnormalities.  Per radiology: No acute hemorrhage or definite CT evidence of acute large territory infarct.  No significant radiographic  change from prior CT head.    CXR shows: Heart is enlarged.  Patient is rotated to the right.  Increased soft tissue in the right paratracheal region appear similar as previously discussed.  There is some increased markings at the right base likely with some pleural fluid or thickening.  Left lung is clear.  I have ordered and independently interpreted EKG Reading(s) - see summary below       <> Summary of EKG Reading(s): 1026    No STEMI  Atrial fibrillation with a heart rate of 79  Low voltage  Nonspecific ST changes  No ectopy  Nl axis    Clinical Tests:   Lab Tests: Ordered and Reviewed  The following lab test(s) were unremarkable: CBC, BMP, Urinalysis, Troponin and BNP       <> Summary of Lab: Unremarkable TSH  Radiological Study: Ordered and Reviewed  Medical Tests: Ordered and Reviewed  ED Management:  Treatment plan includes physical exam, cardiac monitoring, labs, imaging studies, pain control, IVF and supportive care.  1303 Ibuprofen ordered.  Patient improved after treatment and tolerating PO.  Splint/Ace wrap placed.  Wife states that patient was placed on aspirin by his primary care physician for his atrial fibrillation.  There are not on any other forms anticoagulation and want to remain on aspirin that he is taking daily now.    Family and patient updated on care.  Discussed laboratory evaluation and possible reasons for weakness or change or worsening dementia.  Offered admission for observation and further evaluation.  Family (wife and daughter was) declined.  Family agrees with assessment and treatment plan and has no further questions or complaints at this time. Given return precautions and demonstrates understanding.    Patient will  follow up with primary care physician as an outpatient.  Prescription given: None (given options over-the-counter medicines)            Scribe Attestation:   Scribe #1: I performed the above scribed service and the documentation accurately describes the services I  performed. I attest to the accuracy of the note.    Attending Attestation:           Physician Attestation for Scribe:      Comments: I, Dr. Mando Lindsay, personally performed the services described in this documentation. All medical record entries made by the scribe were at my direction and in my presence.  I have reviewed the chart and agree that the record reflects my personal performance and is accurate and complete. Mando Lindsay MD.  12:56 PM 03/25/2018                 Clinical Impression:   The primary encounter diagnosis was Atrial fibrillation with controlled ventricular rate. Diagnoses of Weakness, Hand pain, right, and Dementia without behavioral disturbance, unspecified dementia type were also pertinent to this visit.    Disposition:   Disposition: Discharged  Condition: Stable  1554 Call to follow-up patient at home.  Patient is moving better after the IV fluids.                        Mando Lindsay MD  03/25/18 1556

## 2018-03-25 NOTE — ED NOTES
Patient identifiers verified and correct for Migue Arriaga.   LOC: The patient is awake, alert. Pt has dementia and is oriented to person only  APPEARANCE: Patient appears comfortable and in no acute distress, patient is clean and well groomed.  SKIN: The skin is warm and dry, color consistent with ethnicity, patient has normal skin turgor and moist mucus membranes, skin intact, no breakdown or bruising noted.   MUSCULOSKELETAL: Patient moving all extremities spontaneously,swelling noted to right hand.  RESPIRATORY: Airway is open and patent, respirations are spontaneous, patient has a normal effort and rate, no accessory muscle use noted, pt placed on continuous pulse ox with O2 sats noted at 97% on room air.  CARDIAC: Pt placed on cardiac monitor. Patient has a normal rate and regular rhythm, no edema noted, capillary refill < 3 seconds.   GASTRO: Soft and non tender to palpation, no distention noted, normoactive bowel sounds present in all four quadrants. Pt states bowel movements have been regular.  : Pt denies any pain or frequency with urination.  NEURO: Pt opens eyes spontaneously, behavior appropriate to situation, follows commands, facial expression symmetrical, bilateral hand grasp equal and even, purposeful motor response noted, normal sensation in all extremities when touched with a finger.

## 2018-03-25 NOTE — DISCHARGE INSTRUCTIONS
Your chest xray shows Heart is enlarged.  Patient is rotated to the right.  Increased soft tissue in the right paratracheal region appear similar as previously discussed.  There is some increased markings at the right base likely with some pleural fluid or thickening.  Left lung is clear.  Your head scan does not show bleeds, tumor or evidence of stroke.    Your BNP is mildly elevated but the same as in December 2013.  The rest of your labs are unremarkable.    You have decided not to be admitted to the hospital.  You will need to see your doctor in order to have further evaluation.

## 2018-03-25 NOTE — ED TRIAGE NOTES
Pt's family reports pt has become weaker and weaker over the past 4 days. Pt also has swelling to the right hand x4 days. Pt has dementia and family states his LOC is below normal.

## 2018-03-26 ENCOUNTER — PES CALL (OUTPATIENT)
Dept: ADMINISTRATIVE | Facility: CLINIC | Age: 83
End: 2018-03-26

## 2018-04-09 ENCOUNTER — OFFICE VISIT (OUTPATIENT)
Dept: INTERNAL MEDICINE | Facility: CLINIC | Age: 83
End: 2018-04-09
Payer: COMMERCIAL

## 2018-04-09 VITALS
OXYGEN SATURATION: 92 % | BODY MASS INDEX: 23.71 KG/M2 | HEART RATE: 69 BPM | DIASTOLIC BLOOD PRESSURE: 64 MMHG | HEIGHT: 69 IN | SYSTOLIC BLOOD PRESSURE: 100 MMHG | WEIGHT: 160.06 LBS

## 2018-04-09 DIAGNOSIS — G30.1 LATE ONSET ALZHEIMER'S DISEASE WITHOUT BEHAVIORAL DISTURBANCE: ICD-10-CM

## 2018-04-09 DIAGNOSIS — F02.80 LATE ONSET ALZHEIMER'S DISEASE WITHOUT BEHAVIORAL DISTURBANCE: ICD-10-CM

## 2018-04-09 PROCEDURE — 99213 OFFICE O/P EST LOW 20 MIN: CPT | Mod: S$GLB,,, | Performed by: FAMILY MEDICINE

## 2018-04-09 PROCEDURE — 99999 PR PBB SHADOW E&M-EST. PATIENT-LVL IV: CPT | Mod: PBBFAC,,, | Performed by: FAMILY MEDICINE

## 2018-04-09 NOTE — PATIENT INSTRUCTIONS
Wt Readings from Last 12 Encounters:   04/09/18 72.6 kg (160 lb 0.9 oz)   03/25/18 72.6 kg (160 lb)   02/05/18 71.4 kg (157 lb 6.5 oz)   04/24/17 72 kg (158 lb 11.7 oz)   04/21/17 69.6 kg (153 lb 7 oz)   03/29/17 70.1 kg (154 lb 9.6 oz)   03/15/17 76 kg (167 lb 8.8 oz)   03/10/17 70.6 kg (155 lb 10.3 oz)   02/10/17 70.7 kg (155 lb 13.8 oz)   01/27/17 71.1 kg (156 lb 12 oz)   11/04/16 73.3 kg (161 lb 9.6 oz)   09/23/16 72 kg (158 lb 11.7 oz)

## 2018-04-09 NOTE — PROGRESS NOTES
Subjective:       Patient ID: Migue Fraser is a 89 y.o. male.    Chief Complaint: Follow-up (hospital visit, questions about )    Patient is here for follow-up of recent ER visit where he was lethargic, hard to arouse, and with IV fluids in ER returned to normal - though no objective signs of dehydration baed on weight drop, or BUD-Cr bounce.   He is with daughter Rebekah (from [place]) today along with one of his caretakers they hire. He has 24/7 care. He lives with his wife.    Review of Systems   Constitutional: Negative for chills and fatigue.   HENT: Negative for ear pain.    Eyes: Negative for pain.   Respiratory: Negative for chest tightness.    Cardiovascular: Negative for chest pain.   Gastrointestinal: Negative for abdominal pain.   Genitourinary: Negative for flank pain.   Musculoskeletal: Negative for gait problem.   Neurological: Negative for syncope.   Psychiatric/Behavioral: Positive for confusion. Negative for behavioral problems.       Objective:      Physical Exam   Constitutional: He appears well-developed.   HENT:   Head: Normocephalic and atraumatic.   Eyes: EOM are normal.   Neck: Neck supple.   Cardiovascular: Normal rate.    Pulmonary/Chest: Breath sounds normal. No respiratory distress. He has no wheezes. He has no rales.   Abdominal: Soft.   Musculoskeletal: He exhibits no edema.   Neurological: He is alert.   Skin: Skin is dry.   Psychiatric: His behavior is normal.   Does not know who the president is.  Does not know what day it is       Assessment:       1. Late onset Alzheimer's disease without behavioral disturbance        Plan:       Migue was seen today for follow-up.    Diagnoses and all orders for this visit:    Late onset Alzheimer's disease without behavioral disturbance  - we discussed limited options for dementia. Already has 24/7 care, has seen neurology in past, expect it will worsen over time  - if he seems lethargic / dehydrated at home, can message or call us  and we can try to have HH do IVF - but this will take some time, not liely to be same day the way ER visit is  - they have a livign will/ad dir - they tucker end copy to medical records so it is in [facility]. They report he is DNR. They don't think hsi wife would want feeding tubes    Shingles vaccine #1 of 2 today  As his right hand is back to normal stop the advil  F/u 5 mo kieran prn

## 2018-04-10 ENCOUNTER — PATIENT MESSAGE (OUTPATIENT)
Dept: INTERNAL MEDICINE | Facility: CLINIC | Age: 83
End: 2018-04-10

## 2018-04-13 RX ORDER — TRAZODONE HYDROCHLORIDE 50 MG/1
TABLET ORAL
Qty: 30 TABLET | Refills: 5 | Status: SHIPPED | OUTPATIENT
Start: 2018-04-13 | End: 2018-10-10 | Stop reason: SDUPTHER

## 2018-04-13 RX ORDER — METOPROLOL TARTRATE 25 MG/1
TABLET, FILM COATED ORAL
Qty: 60 TABLET | Refills: 3 | Status: ON HOLD | OUTPATIENT
Start: 2018-04-13 | End: 2018-05-25 | Stop reason: HOSPADM

## 2018-04-30 ENCOUNTER — HOSPITAL ENCOUNTER (OUTPATIENT)
Dept: CARDIOLOGY | Facility: CLINIC | Age: 83
Discharge: HOME | End: 2018-04-30
Payer: MEDICARE

## 2018-04-30 ENCOUNTER — OFFICE VISIT (OUTPATIENT)
Dept: ELECTROPHYSIOLOGY | Facility: CLINIC | Age: 83
End: 2018-04-30
Payer: MEDICARE

## 2018-04-30 VITALS
BODY MASS INDEX: 25.11 KG/M2 | WEIGHT: 160 LBS | DIASTOLIC BLOOD PRESSURE: 70 MMHG | HEART RATE: 66 BPM | SYSTOLIC BLOOD PRESSURE: 110 MMHG | HEIGHT: 67 IN

## 2018-04-30 DIAGNOSIS — I48.91 ATRIAL FIBRILLATION, UNSPECIFIED TYPE: ICD-10-CM

## 2018-04-30 DIAGNOSIS — Z86.79 HISTORY OF SUBDURAL HEMATOMA: ICD-10-CM

## 2018-04-30 DIAGNOSIS — I49.5 SICK SINUS SYNDROME: ICD-10-CM

## 2018-04-30 DIAGNOSIS — G30.1 LATE ONSET ALZHEIMER'S DISEASE WITHOUT BEHAVIORAL DISTURBANCE: ICD-10-CM

## 2018-04-30 DIAGNOSIS — I48.21 PERMANENT ATRIAL FIBRILLATION: Primary | ICD-10-CM

## 2018-04-30 DIAGNOSIS — F02.80 LATE ONSET ALZHEIMER'S DISEASE WITHOUT BEHAVIORAL DISTURBANCE: ICD-10-CM

## 2018-04-30 PROBLEM — R79.1 ABNORMAL COAGULATION PROFILE: Status: RESOLVED | Noted: 2017-03-11 | Resolved: 2018-04-30

## 2018-04-30 PROBLEM — Z78.9 FULL CODE STATUS: Status: RESOLVED | Noted: 2017-12-18 | Resolved: 2018-04-30

## 2018-04-30 PROBLEM — Z79.01 CURRENT USE OF LONG TERM ANTICOAGULATION: Status: RESOLVED | Noted: 2017-03-10 | Resolved: 2018-04-30

## 2018-04-30 PROCEDURE — 99214 OFFICE O/P EST MOD 30 MIN: CPT | Mod: S$GLB,,, | Performed by: INTERNAL MEDICINE

## 2018-04-30 PROCEDURE — 99999 PR PBB SHADOW E&M-EST. PATIENT-LVL III: CPT | Mod: PBBFAC,,, | Performed by: INTERNAL MEDICINE

## 2018-04-30 PROCEDURE — 93000 ELECTROCARDIOGRAM COMPLETE: CPT | Mod: S$GLB,,, | Performed by: INTERNAL MEDICINE

## 2018-04-30 RX ORDER — ZOSTER VACCINE RECOMBINANT, ADJUVANTED 50 MCG/0.5
KIT INTRAMUSCULAR
Refills: 0 | Status: ON HOLD | COMMUNITY
Start: 2018-04-09 | End: 2018-05-24 | Stop reason: HOSPADM

## 2018-04-30 NOTE — PROGRESS NOTES
Subjective:     HPI     I had the pleasure of seeing Migue Fraser in follow-up for his history of atrial fibrillation and sick sinus syndrome. He is an 89-year old  at [facility] with no significant past medical history who was reportedly started on Toprol XL 12.5 mg daily about 10 years ago prior to knee replacement surgery. He eventually started taking this medication every other day. In 4/2013, Mr. Fraser suffered a mechanical fall (he states he was walking outside while reading and missed a step), which resulted in a hand laceration. While rehabbing this injury, he was noted to have resting heart rates in the low 40s bpm range. Beta-blockers were discontinued at that time, with an improvement in his heart rate to the high 50s.    In 12/2013, Mr. Fraser noted that his heart rate was in the 115s bpm range (he had not checked it for about 6 months). [place] [facility] [place], where he was found to be in atrial fibrillation with a rapid ventricular response. When he initially saw me in the office (12/2013), I started Toprol XL 12.5 mg qd and recommended pacemaker implantation. He opted to stop beta-blockers instead. He monitored his heart rate on a daily basis, and it remained in the 70-90s bpm range. He was initially on Eliquis for stroke prophylaxis. However, this was stopped in 3/2017 after he suffered yet another mechanical fall, this time resulting in a SDH. He has been placed back on Metoprolol at 25 mg bid. He was recently diagnosed with late onset Alzheimer's disease.    I reviewed today's ECG tracing, which shows AF at 66 bpm.    Review of Systems   Constitution: Negative for chills, decreased appetite, diaphoresis, fever, weakness, malaise/fatigue, night sweats, weight gain and weight loss.   Eyes: Negative for blurred vision, double vision, visual disturbance and visual halos.   Cardiovascular: Negative for chest pain, claudication, cyanosis, dyspnea on  exertion, irregular heartbeat, leg swelling, near-syncope, orthopnea, palpitations, paroxysmal nocturnal dyspnea and syncope.   Respiratory: Negative for cough, hemoptysis, shortness of breath, sleep disturbances due to breathing, snoring, sputum production and wheezing.    Endocrine: Negative for cold intolerance and heat intolerance.   Hematologic/Lymphatic: Negative for adenopathy and bleeding problem. Does not bruise/bleed easily.   Skin: Negative for color change, flushing, nail changes and poor wound healing.   Musculoskeletal: Negative for falls, joint swelling, muscle cramps, muscle weakness, myalgias and neck pain.   Gastrointestinal: Negative for heartburn, hematemesis, jaundice, nausea and vomiting.   Neurological: Negative for disturbances in coordination, dizziness, focal weakness, light-headedness, loss of balance, numbness, paresthesias, seizures, sensory change, tremors and vertigo.   Psychiatric/Behavioral: Positive for memory loss. Negative for altered mental status. The patient is not nervous/anxious.         Objective:    Physical Exam   Constitutional: He appears lethargic. He appears cachectic.   HENT:   Head: Normocephalic and atraumatic.   Eyes: Pupils are equal, round, and reactive to light.   Neck: Normal range of motion.   Cardiovascular: Normal rate, normal heart sounds and intact distal pulses.  An irregularly irregular rhythm present.   Pulmonary/Chest: Effort normal and breath sounds normal. He has no rales.   Abdominal: Bowel sounds are normal.   Neurological: He appears lethargic.   Vitals reviewed.        Assessment:       1. Permanent atrial fibrillation    2. Sick sinus syndrome    3. History of subdural hematoma    4. Late onset Alzheimer's disease without behavioral disturbance         Plan:   In summary, Migue Saucedo Evelio is a 89-year old  with a history of permanent atrial fibrillation. His rates are under excellent control. He is not an anticoagulation  candidate due to many mechanical, traumatic falls over the years. The plan is to continue Metoprolol and aspirin.    He will see me again on an as needed basis.    Thank you for allowing me to participate in the care of this patient. Please do not hesitate to call me with any questions or concerns.

## 2018-05-01 ENCOUNTER — TELEPHONE (OUTPATIENT)
Dept: ELECTROPHYSIOLOGY | Facility: CLINIC | Age: 83
End: 2018-05-01

## 2018-05-01 DIAGNOSIS — I48.91 ATRIAL FIBRILLATION, UNSPECIFIED TYPE: Primary | ICD-10-CM

## 2018-05-21 ENCOUNTER — PATIENT MESSAGE (OUTPATIENT)
Dept: INTERNAL MEDICINE | Facility: CLINIC | Age: 83
End: 2018-05-21

## 2018-05-22 ENCOUNTER — PATIENT MESSAGE (OUTPATIENT)
Dept: INTERNAL MEDICINE | Facility: CLINIC | Age: 83
End: 2018-05-22

## 2018-05-22 ENCOUNTER — HOSPITAL ENCOUNTER (INPATIENT)
Facility: HOSPITAL | Age: 83
LOS: 3 days | Discharge: HOSPICE | DRG: 193 | End: 2018-05-25
Attending: EMERGENCY MEDICINE | Admitting: EMERGENCY MEDICINE
Payer: MEDICARE

## 2018-05-22 ENCOUNTER — TELEPHONE (OUTPATIENT)
Dept: INTERNAL MEDICINE | Facility: CLINIC | Age: 83
End: 2018-05-22

## 2018-05-22 DIAGNOSIS — J18.9 COMMUNITY ACQUIRED PNEUMONIA OF RIGHT LOWER LOBE OF LUNG: Primary | ICD-10-CM

## 2018-05-22 DIAGNOSIS — Z51.5 PALLIATIVE CARE ENCOUNTER: ICD-10-CM

## 2018-05-22 DIAGNOSIS — G93.40 ACUTE ENCEPHALOPATHY: ICD-10-CM

## 2018-05-22 PROBLEM — I10 ESSENTIAL HYPERTENSION: Status: ACTIVE | Noted: 2018-05-22

## 2018-05-22 PROBLEM — J01.90 ACUTE SINUSITIS: Status: ACTIVE | Noted: 2018-05-22

## 2018-05-22 PROBLEM — E43 SEVERE MALNUTRITION: Status: ACTIVE | Noted: 2018-05-22

## 2018-05-22 LAB
ALBUMIN SERPL BCP-MCNC: 3.5 G/DL
ALP SERPL-CCNC: 108 U/L
ALT SERPL W/O P-5'-P-CCNC: 11 U/L
AMMONIA PLAS-SCNC: 20 UMOL/L
AMPHET+METHAMPHET UR QL: NEGATIVE
ANION GAP SERPL CALC-SCNC: 11 MMOL/L
APAP SERPL-MCNC: <3 UG/ML
AST SERPL-CCNC: 25 U/L
BACTERIA #/AREA URNS AUTO: ABNORMAL /HPF
BARBITURATES UR QL SCN>200 NG/ML: NEGATIVE
BASOPHILS # BLD AUTO: 0.05 K/UL
BASOPHILS NFR BLD: 0.8 %
BENZODIAZ UR QL SCN>200 NG/ML: NEGATIVE
BILIRUB SERPL-MCNC: 1 MG/DL
BILIRUB UR QL STRIP: NEGATIVE
BNP SERPL-MCNC: 266 PG/ML
BUN SERPL-MCNC: 17 MG/DL
BZE UR QL SCN: NEGATIVE
CALCIUM SERPL-MCNC: 10.2 MG/DL
CANNABINOIDS UR QL SCN: NEGATIVE
CHLORIDE SERPL-SCNC: 98 MMOL/L
CLARITY UR REFRACT.AUTO: CLEAR
CO2 SERPL-SCNC: 28 MMOL/L
COLOR UR AUTO: ABNORMAL
CREAT SERPL-MCNC: 0.8 MG/DL
CREAT UR-MCNC: 153 MG/DL
DIFFERENTIAL METHOD: ABNORMAL
EOSINOPHIL # BLD AUTO: 0.1 K/UL
EOSINOPHIL NFR BLD: 2.2 %
ERYTHROCYTE [DISTWIDTH] IN BLOOD BY AUTOMATED COUNT: 12.2 %
EST. GFR  (AFRICAN AMERICAN): >60 ML/MIN/1.73 M^2
EST. GFR  (NON AFRICAN AMERICAN): >60 ML/MIN/1.73 M^2
GLUCOSE SERPL-MCNC: 106 MG/DL
GLUCOSE UR QL STRIP: NEGATIVE
HCT VFR BLD AUTO: 40.7 %
HGB BLD-MCNC: 13.9 G/DL
HGB UR QL STRIP: NEGATIVE
HYALINE CASTS UR QL AUTO: 0 /LPF
IMM GRANULOCYTES # BLD AUTO: 0.02 K/UL
IMM GRANULOCYTES NFR BLD AUTO: 0.3 %
INR PPP: 1.2
KETONES UR QL STRIP: ABNORMAL
LEUKOCYTE ESTERASE UR QL STRIP: NEGATIVE
LYMPHOCYTES # BLD AUTO: 1 K/UL
LYMPHOCYTES NFR BLD: 15.7 %
MAGNESIUM SERPL-MCNC: 2.4 MG/DL
MCH RBC QN AUTO: 34 PG
MCHC RBC AUTO-ENTMCNC: 34.2 G/DL
MCV RBC AUTO: 100 FL
METHADONE UR QL SCN>300 NG/ML: NEGATIVE
MICROSCOPIC COMMENT: ABNORMAL
MONOCYTES # BLD AUTO: 0.7 K/UL
MONOCYTES NFR BLD: 11.3 %
NEUTROPHILS # BLD AUTO: 4.2 K/UL
NEUTROPHILS NFR BLD: 69.7 %
NITRITE UR QL STRIP: NEGATIVE
NRBC BLD-RTO: 0 /100 WBC
OPIATES UR QL SCN: NEGATIVE
PCP UR QL SCN>25 NG/ML: NEGATIVE
PH UR STRIP: 5 [PH] (ref 5–8)
PLATELET # BLD AUTO: 216 K/UL
PMV BLD AUTO: 9.8 FL
POTASSIUM SERPL-SCNC: 4.5 MMOL/L
PROT SERPL-MCNC: 7.9 G/DL
PROT UR QL STRIP: ABNORMAL
PROTHROMBIN TIME: 12 SEC
RBC # BLD AUTO: 4.09 M/UL
RBC #/AREA URNS AUTO: 6 /HPF (ref 0–4)
SODIUM SERPL-SCNC: 137 MMOL/L
SP GR UR STRIP: 1.02 (ref 1–1.03)
TOXICOLOGY INFORMATION: NORMAL
TROPONIN I SERPL DL<=0.01 NG/ML-MCNC: 0.01 NG/ML
URN SPEC COLLECT METH UR: ABNORMAL
UROBILINOGEN UR STRIP-ACNC: 4 EU/DL
WBC # BLD AUTO: 6.04 K/UL
WBC #/AREA URNS AUTO: 2 /HPF (ref 0–5)

## 2018-05-22 PROCEDURE — 99223 1ST HOSP IP/OBS HIGH 75: CPT | Mod: ,,, | Performed by: HOSPITALIST

## 2018-05-22 PROCEDURE — 93010 ELECTROCARDIOGRAM REPORT: CPT | Mod: ,,, | Performed by: INTERNAL MEDICINE

## 2018-05-22 PROCEDURE — 84484 ASSAY OF TROPONIN QUANT: CPT

## 2018-05-22 PROCEDURE — 80053 COMPREHEN METABOLIC PANEL: CPT

## 2018-05-22 PROCEDURE — 85610 PROTHROMBIN TIME: CPT

## 2018-05-22 PROCEDURE — 82140 ASSAY OF AMMONIA: CPT

## 2018-05-22 PROCEDURE — 83880 ASSAY OF NATRIURETIC PEPTIDE: CPT

## 2018-05-22 PROCEDURE — 83735 ASSAY OF MAGNESIUM: CPT

## 2018-05-22 PROCEDURE — 81001 URINALYSIS AUTO W/SCOPE: CPT

## 2018-05-22 PROCEDURE — 99285 EMERGENCY DEPT VISIT HI MDM: CPT | Mod: 25

## 2018-05-22 PROCEDURE — 25000003 PHARM REV CODE 250: Performed by: EMERGENCY MEDICINE

## 2018-05-22 PROCEDURE — 85025 COMPLETE CBC W/AUTO DIFF WBC: CPT

## 2018-05-22 PROCEDURE — 87040 BLOOD CULTURE FOR BACTERIA: CPT | Mod: 59

## 2018-05-22 PROCEDURE — 63600175 PHARM REV CODE 636 W HCPCS: Performed by: EMERGENCY MEDICINE

## 2018-05-22 PROCEDURE — 80307 DRUG TEST PRSMV CHEM ANLYZR: CPT

## 2018-05-22 PROCEDURE — 96374 THER/PROPH/DIAG INJ IV PUSH: CPT

## 2018-05-22 PROCEDURE — 25000003 PHARM REV CODE 250: Performed by: HOSPITALIST

## 2018-05-22 PROCEDURE — 63600175 PHARM REV CODE 636 W HCPCS: Performed by: HOSPITALIST

## 2018-05-22 PROCEDURE — 11000001 HC ACUTE MED/SURG PRIVATE ROOM

## 2018-05-22 PROCEDURE — P9612 CATHETERIZE FOR URINE SPEC: HCPCS

## 2018-05-22 PROCEDURE — 80329 ANALGESICS NON-OPIOID 1 OR 2: CPT

## 2018-05-22 PROCEDURE — 96361 HYDRATE IV INFUSION ADD-ON: CPT

## 2018-05-22 RX ORDER — METOPROLOL TARTRATE 25 MG/1
25 TABLET, FILM COATED ORAL 2 TIMES DAILY
Status: DISCONTINUED | OUTPATIENT
Start: 2018-05-22 | End: 2018-05-25 | Stop reason: HOSPADM

## 2018-05-22 RX ORDER — CEFTRIAXONE 1 G/1
1 INJECTION, POWDER, FOR SOLUTION INTRAMUSCULAR; INTRAVENOUS
Status: DISCONTINUED | OUTPATIENT
Start: 2018-05-23 | End: 2018-05-23

## 2018-05-22 RX ORDER — ENOXAPARIN SODIUM 100 MG/ML
40 INJECTION SUBCUTANEOUS EVERY 24 HOURS
Status: DISCONTINUED | OUTPATIENT
Start: 2018-05-22 | End: 2018-05-25 | Stop reason: HOSPADM

## 2018-05-22 RX ORDER — ONDANSETRON 8 MG/1
8 TABLET, ORALLY DISINTEGRATING ORAL EVERY 8 HOURS PRN
Status: DISCONTINUED | OUTPATIENT
Start: 2018-05-22 | End: 2018-05-25 | Stop reason: HOSPADM

## 2018-05-22 RX ORDER — TIMOLOL MALEATE 5 MG/ML
1 SOLUTION/ DROPS OPHTHALMIC 2 TIMES DAILY
Status: DISCONTINUED | OUTPATIENT
Start: 2018-05-22 | End: 2018-05-25 | Stop reason: HOSPADM

## 2018-05-22 RX ORDER — ESCITALOPRAM OXALATE 20 MG/1
20 TABLET ORAL DAILY
Status: DISCONTINUED | OUTPATIENT
Start: 2018-05-23 | End: 2018-05-25 | Stop reason: HOSPADM

## 2018-05-22 RX ORDER — CEFTRIAXONE 1 G/1
1 INJECTION, POWDER, FOR SOLUTION INTRAMUSCULAR; INTRAVENOUS
Status: COMPLETED | OUTPATIENT
Start: 2018-05-22 | End: 2018-05-22

## 2018-05-22 RX ORDER — NAPROXEN SODIUM 220 MG/1
81 TABLET, FILM COATED ORAL DAILY
Status: DISCONTINUED | OUTPATIENT
Start: 2018-05-23 | End: 2018-05-25 | Stop reason: HOSPADM

## 2018-05-22 RX ORDER — ACETAMINOPHEN 325 MG/1
650 TABLET ORAL EVERY 4 HOURS PRN
Status: DISCONTINUED | OUTPATIENT
Start: 2018-05-22 | End: 2018-05-25 | Stop reason: HOSPADM

## 2018-05-22 RX ORDER — IPRATROPIUM BROMIDE AND ALBUTEROL SULFATE 2.5; .5 MG/3ML; MG/3ML
3 SOLUTION RESPIRATORY (INHALATION)
Status: DISCONTINUED | OUTPATIENT
Start: 2018-05-23 | End: 2018-05-25 | Stop reason: HOSPADM

## 2018-05-22 RX ORDER — AZITHROMYCIN 250 MG/1
500 TABLET, FILM COATED ORAL
Status: COMPLETED | OUTPATIENT
Start: 2018-05-22 | End: 2018-05-22

## 2018-05-22 RX ORDER — LATANOPROST 50 UG/ML
1 SOLUTION/ DROPS OPHTHALMIC DAILY
Status: DISCONTINUED | OUTPATIENT
Start: 2018-05-23 | End: 2018-05-25 | Stop reason: HOSPADM

## 2018-05-22 RX ORDER — AZITHROMYCIN 250 MG/1
500 TABLET, FILM COATED ORAL DAILY
Status: DISCONTINUED | OUTPATIENT
Start: 2018-05-23 | End: 2018-05-23

## 2018-05-22 RX ORDER — TRAZODONE HYDROCHLORIDE 50 MG/1
50 TABLET ORAL NIGHTLY
Status: DISCONTINUED | OUTPATIENT
Start: 2018-05-22 | End: 2018-05-25 | Stop reason: HOSPADM

## 2018-05-22 RX ORDER — SODIUM CHLORIDE 0.9 % (FLUSH) 0.9 %
5 SYRINGE (ML) INJECTION
Status: DISCONTINUED | OUTPATIENT
Start: 2018-05-22 | End: 2018-05-25 | Stop reason: HOSPADM

## 2018-05-22 RX ADMIN — AZITHROMYCIN 500 MG: 250 TABLET, FILM COATED ORAL at 05:05

## 2018-05-22 RX ADMIN — METOPROLOL TARTRATE 25 MG: 25 TABLET ORAL at 11:05

## 2018-05-22 RX ADMIN — TIMOLOL MALEATE 1 DROP: 5 SOLUTION OPHTHALMIC at 11:05

## 2018-05-22 RX ADMIN — SODIUM CHLORIDE 1000 ML: 0.9 INJECTION, SOLUTION INTRAVENOUS at 03:05

## 2018-05-22 RX ADMIN — ENOXAPARIN SODIUM 40 MG: 100 INJECTION SUBCUTANEOUS at 11:05

## 2018-05-22 RX ADMIN — CEFTRIAXONE SODIUM 1 G: 1 INJECTION, POWDER, FOR SOLUTION INTRAMUSCULAR; INTRAVENOUS at 05:05

## 2018-05-22 RX ADMIN — TRAZODONE HYDROCHLORIDE 50 MG: 50 TABLET ORAL at 11:05

## 2018-05-22 NOTE — TELEPHONE ENCOUNTER
Gabi,   I faxed an order to a admit to hospice to [facility] at [phone] (Fax).    Please fax over to them the following:   -list of meds  -list of diagnoses  -recent lab tests    You can also call them at [phone] to see if they need anything else.    I noticed when I opened hsi chart that he is in the ER. I called and spoke to his ER doctor so they are aware he is being admitted to hospice

## 2018-05-22 NOTE — ED PROVIDER NOTES
Encounter Date: 5/22/2018    SCRIBE #1 NOTE: I, Dori Pia, am scribing for, and in the presence of, Dr. Hussein.       History     Chief Complaint   Patient presents with    Fatigue     Time seen by provider: 2:37 PM    This is a 89 y.o. male with medical conditions including glaucoma, afib, sick sinus syndrome, cancer, dementia, who presents with complaint of generalized weakness and fatigue. The patient's family reports a change in his energy level. At baseline, pt has a confused verbal response, requires redirection to take his medications, and ambulates independently. Over the past 4-5 days, pt has had decreased appetite and inability to walk and transport himself as he does normally at his baseline. The patient requires more support to get to the bathroom and to the table to eat. They report at baseline the patient does not drink water, only juice, and is difficult to get him to take his regular medications. They report decreased urination as well. The patient's family reports something very similar happened a month or 2 ago and after some fluids in the ED, he returned to normal. They report a slight cough when the patient lays on his side but state he is not having fever, chills, or vomiting.      The history is provided by a relative and the spouse.     Review of patient's allergies indicates:   Allergen Reactions    Penicillins      Past Medical History:   Diagnosis Date    A-fib     Atrial fibrillation     Blastomycosis     Cancer     prostate CA    Dementia     Glaucoma     Sick sinus syndrome      Past Surgical History:   Procedure Laterality Date    APPENDECTOMY      carpel tunner      CHOLECYSTECTOMY      HERNIA REPAIR      JOINT REPLACEMENT       History reviewed. No pertinent family history.  Social History   Substance Use Topics    Smoking status: Never Smoker    Smokeless tobacco: Never Used    Alcohol use No     Review of Systems   Unable to perform ROS: Dementia    Constitutional: Negative for chills and fever.   HENT: Negative for congestion.    Respiratory: Positive for cough.    Cardiovascular: Negative for chest pain.   Gastrointestinal: Positive for abdominal pain.   Genitourinary: Negative for dysuria.   Musculoskeletal: Negative for joint swelling.   Skin: Negative for rash and wound.   Neurological: Positive for weakness.       Physical Exam     Initial Vitals [05/22/18 1258]   BP Pulse Resp Temp SpO2   110/74 78 16 98.9 °F (37.2 °C) 99 %      MAP       86         Physical Exam    Nursing note and vitals reviewed.  Constitutional: He appears well-developed and well-nourished. He is not diaphoretic. No distress.   HENT:   Head: Normocephalic and atraumatic.   Dry mucous membranes    Eyes: EOM are normal. Pupils are equal, round, and reactive to light. Right eye exhibits no discharge. Left eye exhibits no discharge.   Neck: Normal range of motion. Neck supple.   Cardiovascular: Normal rate, normal heart sounds and intact distal pulses. An irregularly irregular rhythm present. Exam reveals no gallop and no friction rub.    No murmur heard.  Pulmonary/Chest: Breath sounds normal. No respiratory distress.   Abdominal: Soft. Bowel sounds are normal. He exhibits no distension. There is no tenderness. There is no rebound and no guarding.   Scaphoid    Genitourinary: Rectal exam shows guaiac negative stool. Guaiac negative stool. : Acceptable.  Genitourinary Comments: Brown stool     Musculoskeletal: Normal range of motion. He exhibits no tenderness.   Neurological: He is alert. GCS eye subscore is 3. GCS verbal subscore is 4. GCS motor subscore is 6.   Drowsy but arousable to voice. Confused verbal response.   Skin: Skin is warm and dry. Capillary refill takes more than 3 seconds.   Patient rolled, back examined. Mild stage 1 sacral ulcer, no breakdown.          ED Course   Procedures  Labs Reviewed   CBC W/ AUTO DIFFERENTIAL - Abnormal; Notable for the  following:        Result Value    RBC 4.09 (*)     Hemoglobin 13.9 (*)      (*)     MCH 34.0 (*)     Lymph% 15.7 (*)     All other components within normal limits    Narrative:     ONE GREEN    URINALYSIS, REFLEX TO URINE CULTURE - Abnormal; Notable for the following:     Protein, UA 1+ (*)     Ketones, UA 1+ (*)     All other components within normal limits    Narrative:     Preferred Collection Type->Urine, Clean Catch   ACETAMINOPHEN LEVEL - Abnormal; Notable for the following:     Acetaminophen (Tylenol), Serum <3.0 (*)     All other components within normal limits    Narrative:     ONE GREEN    B-TYPE NATRIURETIC PEPTIDE - Abnormal; Notable for the following:      (*)     All other components within normal limits    Narrative:     ONE GREEN    URINALYSIS MICROSCOPIC - Abnormal; Notable for the following:     RBC, UA 6 (*)     All other components within normal limits    Narrative:     Preferred Collection Type->Urine, Clean Catch   CULTURE, BLOOD   CULTURE, BLOOD   COMPREHENSIVE METABOLIC PANEL    Narrative:     ONE GREEN    DRUG SCREEN PANEL, URINE EMERGENCY    Narrative:     Preferred Collection Type->Urine, Clean Catch   PROTIME-INR    Narrative:     ONE GREEN    TROPONIN I    Narrative:     ONE GREEN    AMMONIA    Narrative:     ONE GREEN    MAGNESIUM    Narrative:     ONE GREEN      EKG Readings: (Independently Interpreted)   Heart Rate: 78.   atrial fibrillation, normal axis, low voltage QRS, ST segments normal, twaves normal.           Medical Decision Making:   History:   Old Medical Records: I decided to obtain old medical records.  Initial Assessment:   89 y.o. male with history of SDH, atrial fibrillation, advanced alzheimer's, presents with altered mental status. This patient's differential diagnosis includes, but is not limited to: metabolic causes (i.e., hyper/hyponatremia, hyper/hypoglycemia, hypercalcemia, hyper/hypothyroidism, hypoxia/hypercapnea, hepatic encephalopathy, uremic  encephalopathy, drug intoxication/withdrawal, alcohol intoxication or withdrawal, Wernicke encephalopathy), structural lesions (primary or metastatic tumor, intracranial hemorrhage, infection).    Will obtain serum and urine labs. Will obtain CT head and CXR. Will administer IV fluids. Upon goals of care discussion, patient is DNR. Currently pursuing outpatient enrollment into hospice.  Clinical Tests:   Lab Tests: Ordered and Reviewed  Radiological Study: Ordered and Reviewed  Medical Tests: Ordered and Reviewed  ED Management:  Patient's BNP is at baseline of 266. Troponin is negative, CBC and CMP without significant abnormalities. UA and Utox clear. CXR shows right lower lobe pneumonia. Will obtain cultures and treat with ceftriaxone and azithromycin for CAP and will admit.     On reassessment status post 1L bolus, pt alert at this time. GCS improved to 14. Patient tolerating PO. Not septic. Stable for medical floor.            Scribe Attestation:   Scribe #1: I performed the above scribed service and the documentation accurately describes the services I performed. I attest to the accuracy of the note.    Attending Attestation:           Physician Attestation for Scribe:      Comments: I, Dr. Jose Hussein, personally performed the services described in this documentation. All medical record entries made by the scribe were at my direction and in my presence.  I have reviewed the chart and agree that the record reflects my personal performance and is accurate and complete. Jose Hussein MD.  8:11 PM 05/22/2018                 Clinical Impression:   The primary encounter diagnosis was Community acquired pneumonia of right lower lobe of lung. A diagnosis of Acute encephalopathy was also pertinent to this visit.    Disposition:   Disposition: Admitted                        Jose Hussein MD  05/22/18 2011

## 2018-05-22 NOTE — ED NOTES
Attempted to call report, was told the night charge nurse had not assigned a nurse and can not take report.

## 2018-05-22 NOTE — TELEPHONE ENCOUNTER
Gabi,  Please call Caty/[facility]/[phone]   Re: hospital bed  I spoke to hospice and am admitting him to [facility].   Hospice will evaluate and get him whatever hospital bed equipment he needs  dillan

## 2018-05-22 NOTE — ED NOTES
LOC: The patient is awake and alert and has previously been diagnosed with alzheimers disease.  APPEARANCE: Patient resting comfortably and in no acute distress, patient is clean and well groomed.  SKIN: The skin is warm and dry, patient has normal skin turgor and moist mucus membranes, has large reddened area to right hip approx 6x8 inches.   MUSKULOSKELETAL: Patient moving all extremities well, no obvious swelling or deformities noted.  RESPIRATORY: Airway is open and patent, respirations are spontaneous, patient has a normal effort and rate. Breath sounds are clear and equal bilaterally.  ABDOMEN: Soft and non tender to palpation, no distention noted. Bowel sounds present.

## 2018-05-22 NOTE — TELEPHONE ENCOUNTER
Received: Today   Message Contents   Lana AGUILAR Staff   Caller: Caty/[facility]/[phone] (Today,  8:19 AM)             Caty stated the pt is requesting a hospital bed, Caty would like to request that the doctor write a order for a hospital bed and send it to [facility]. Fax [phone]      Please advise  Thank you  RENETTA Okeefe

## 2018-05-22 NOTE — TELEPHONE ENCOUNTER
----- Message from Lana Finn sent at 5/22/2018  8:19 AM CDT -----  Contact: Caty/[employer]/[phone]  Caty stated the pt is requesting a hospital bed, Caty would like to request that the doctor write a order for a hospital bed and send it to [facility]. Fax [phone]

## 2018-05-22 NOTE — TELEPHONE ENCOUNTER
Documentation of EPIC email to pt's family:    I sent an admit order to [facility].  You can see more info about them at [email or web address]  They will be contacting you soon, I gave the phone number in the chart: [phone]    If you wish to contact them their info is:  [facility]  [address]  [place] [id number] [phone]  [phone] (fax)    When I went to his chart to send this I noticed he is currently in the ER. I called his ER doctor to let him know that I sent the admit to hospice order to [facility]  dillan

## 2018-05-22 NOTE — ED NOTES
""Up to four days ago he was able to walk to the bathroom and now he is almost comatose" caregiver states.   "

## 2018-05-22 NOTE — TELEPHONE ENCOUNTER
Migue Telles MD 9 hours ago (9:59 PM)         Good Evening Dr. Telles,   In the past 10 days we have noticed a decline   in my father as far as increased weakness (not   wanting to get up/ move around  and not eating   regularly... past few days very little and only with   assistance.     I drove in from [place] earlier this evening thinking   we  should possibly take him to the hospital because   of possible dehydration.  Prior to my arrival ,one   of his favorite sitters arrived and was able to sit him up   and drink a full glass of juice.   When I arrived his color looked   good and he seemed comfortable.   Is there a possibility of getting a nurse from   a hospice type agency to evaluate him weekly   and communicate with my step mother as to   what his needs may be .   She has been very frantic and   stressed the past 4 -5 days when he seemed to be   declining  and unsure as to what action to take.   At his last hospital visit in March he was given an IV   which made a huge difference in his strength and   endurance.  Is this a possibility with  a home   visit from a home heatlh / hospice nurse?     I do not know what qualifies as hospice , but   being his age and diagnosis of end stage dementia   we are requesting to keep him comfortable as possible   at his own home.     Thank you for any help you can offer,   Prema Fraser             Please advise  Thank you  RENETTA Okeefe

## 2018-05-22 NOTE — TELEPHONE ENCOUNTER
Chart documentation: [facility] email:    Prema,  Yes, I can put in a hospice order for them to come out and evaluate him for intake to hospice. With hospice comes regular nursing visits and emotional assistance for family members. I think Alzheimer's and his age should qualify him, as I believe it is reasonable to say we don't expect him to last for more than a year (though he is not kicked off hospice if he does last longer). Before I put the order in, let me just confirm with you that his wife (your step mother) is in agreement and understand what it means.   -The main diagnosis for hospice (in this case Alzheimer's) would no longer be a reason to go to the ER or hospital. Instead declining cognitive abilities and other Alzheimer's associated symptoms (includign declining ability to eat and drink well) would be managed at home with the hospice team so he could pass peacefully at home.    -non-Alzheimer's situations (e.g., getting pneumonia) could be something he goes to the ER/hospitcal for without endangering his hospice status. Though some people on hospice would prefer to to stay at home and let nature take its course    Do you have a specific hospice you prefer?  Please message back and if all are on board with the above approach I can put the hospice order in today and a hospice nurse will be out usually within a day or two for the intake visit.  I also saw there was a request for a hospital bed. These are often managed better by the hospice group, so if I am ordering hospice for him this week, I will defer the need for a hospital bed to them.   Look forward to hearing back from you for the final go ahead on hospice.  Dr. Dipak Telles      ===View-only below this line===      ----- Message -----     From: Migue Fraser     Sent: 5/21/2018  9:59 PM CDT       To: Iggy Telles MD  Subject: Referral Request    Good Evening Dr. Telles,  In the past 10 days we have noticed a decline   in my father as far as  increased weakness (not  wanting to get up/ move around  and not eating   regularly... past few days very little and only with  assistance.    I drove in from [place] earlier this evening thinking   we  should possibly take him to the hospital because   of possible dehydration.  Prior to my arrival ,one   of his favorite sitters arrived and was able to sit him up   and drink a full glass of juice.   When I arrived his color looked   good and he seemed comfortable.   Is there a possibility of getting a nurse from   a hospice type agency to evaluate him weekly   and communicate with my step mother as to  what his needs may be .   She has been very frantic and   stressed the past 4 -5 days when he seemed to be  declining  and unsure as to what action to take.  At his last hospital visit in March he was given an IV   which made a huge difference in his strength and  endurance.  Is this a possibility with  a home  visit from a home heatlh / hospice nurse?    I do not know what qualifies as hospice , but  being his age and diagnosis of end stage dementia   we are requesting to keep him comfortable as possible   at his own home.    Thank you for any help you can offer,  Prema Fraser

## 2018-05-23 PROBLEM — R62.7 ADULT FAILURE TO THRIVE: Status: ACTIVE | Noted: 2018-05-23

## 2018-05-23 PROBLEM — Z51.5 PALLIATIVE CARE ENCOUNTER: Status: ACTIVE | Noted: 2018-05-23

## 2018-05-23 LAB
ANION GAP SERPL CALC-SCNC: 10 MMOL/L
BASOPHILS # BLD AUTO: 0.06 K/UL
BASOPHILS NFR BLD: 1 %
BUN SERPL-MCNC: 16 MG/DL
CALCIUM SERPL-MCNC: 9.6 MG/DL
CHLORIDE SERPL-SCNC: 99 MMOL/L
CO2 SERPL-SCNC: 29 MMOL/L
CREAT SERPL-MCNC: 0.9 MG/DL
DIFFERENTIAL METHOD: ABNORMAL
EOSINOPHIL # BLD AUTO: 0.2 K/UL
EOSINOPHIL NFR BLD: 2.7 %
ERYTHROCYTE [DISTWIDTH] IN BLOOD BY AUTOMATED COUNT: 12.3 %
EST. GFR  (AFRICAN AMERICAN): >60 ML/MIN/1.73 M^2
EST. GFR  (NON AFRICAN AMERICAN): >60 ML/MIN/1.73 M^2
GLUCOSE SERPL-MCNC: 108 MG/DL
HCT VFR BLD AUTO: 41.2 %
HGB BLD-MCNC: 13.8 G/DL
IMM GRANULOCYTES # BLD AUTO: 0.02 K/UL
IMM GRANULOCYTES NFR BLD AUTO: 0.3 %
LYMPHOCYTES # BLD AUTO: 1 K/UL
LYMPHOCYTES NFR BLD: 16.7 %
MAGNESIUM SERPL-MCNC: 1.9 MG/DL
MCH RBC QN AUTO: 33.7 PG
MCHC RBC AUTO-ENTMCNC: 33.5 G/DL
MCV RBC AUTO: 101 FL
MONOCYTES # BLD AUTO: 0.6 K/UL
MONOCYTES NFR BLD: 10.4 %
NEUTROPHILS # BLD AUTO: 4.1 K/UL
NEUTROPHILS NFR BLD: 68.9 %
NRBC BLD-RTO: 0 /100 WBC
PHOSPHATE SERPL-MCNC: 2.8 MG/DL
PLATELET # BLD AUTO: 212 K/UL
PMV BLD AUTO: 9.5 FL
POTASSIUM SERPL-SCNC: 4 MMOL/L
PREALB SERPL-MCNC: 15 MG/DL
RBC # BLD AUTO: 4.09 M/UL
SODIUM SERPL-SCNC: 138 MMOL/L
WBC # BLD AUTO: 5.98 K/UL

## 2018-05-23 PROCEDURE — 84100 ASSAY OF PHOSPHORUS: CPT

## 2018-05-23 PROCEDURE — 25000242 PHARM REV CODE 250 ALT 637 W/ HCPCS: Performed by: HOSPITALIST

## 2018-05-23 PROCEDURE — 85025 COMPLETE CBC W/AUTO DIFF WBC: CPT

## 2018-05-23 PROCEDURE — 94640 AIRWAY INHALATION TREATMENT: CPT

## 2018-05-23 PROCEDURE — 84134 ASSAY OF PREALBUMIN: CPT

## 2018-05-23 PROCEDURE — 80048 BASIC METABOLIC PNL TOTAL CA: CPT

## 2018-05-23 PROCEDURE — 25000003 PHARM REV CODE 250: Performed by: HOSPITALIST

## 2018-05-23 PROCEDURE — 36415 COLL VENOUS BLD VENIPUNCTURE: CPT

## 2018-05-23 PROCEDURE — 99223 1ST HOSP IP/OBS HIGH 75: CPT | Mod: ,,, | Performed by: CLINICAL NURSE SPECIALIST

## 2018-05-23 PROCEDURE — 94761 N-INVAS EAR/PLS OXIMETRY MLT: CPT

## 2018-05-23 PROCEDURE — 11000001 HC ACUTE MED/SURG PRIVATE ROOM

## 2018-05-23 PROCEDURE — 99232 SBSQ HOSP IP/OBS MODERATE 35: CPT | Mod: ,,, | Performed by: HOSPITALIST

## 2018-05-23 PROCEDURE — 63600175 PHARM REV CODE 636 W HCPCS: Performed by: HOSPITALIST

## 2018-05-23 PROCEDURE — 83735 ASSAY OF MAGNESIUM: CPT

## 2018-05-23 RX ORDER — AZITHROMYCIN 250 MG/1
250 TABLET, FILM COATED ORAL DAILY
Status: DISCONTINUED | OUTPATIENT
Start: 2018-05-23 | End: 2018-05-25 | Stop reason: HOSPADM

## 2018-05-23 RX ORDER — CEFTRIAXONE 1 G/1
1 INJECTION, POWDER, FOR SOLUTION INTRAMUSCULAR; INTRAVENOUS
Status: DISCONTINUED | OUTPATIENT
Start: 2018-05-23 | End: 2018-05-25 | Stop reason: HOSPADM

## 2018-05-23 RX ADMIN — ENOXAPARIN SODIUM 40 MG: 100 INJECTION SUBCUTANEOUS at 05:05

## 2018-05-23 RX ADMIN — TIMOLOL MALEATE 1 DROP: 5 SOLUTION OPHTHALMIC at 09:05

## 2018-05-23 RX ADMIN — METOPROLOL TARTRATE 25 MG: 25 TABLET ORAL at 08:05

## 2018-05-23 RX ADMIN — AZITHROMYCIN 250 MG: 250 TABLET, FILM COATED ORAL at 05:05

## 2018-05-23 RX ADMIN — LATANOPROST 1 DROP: 50 SOLUTION OPHTHALMIC at 08:05

## 2018-05-23 RX ADMIN — IPRATROPIUM BROMIDE AND ALBUTEROL SULFATE 3 ML: .5; 3 SOLUTION RESPIRATORY (INHALATION) at 04:05

## 2018-05-23 RX ADMIN — CEFTRIAXONE SODIUM 1 G: 1 INJECTION, POWDER, FOR SOLUTION INTRAMUSCULAR; INTRAVENOUS at 05:05

## 2018-05-23 RX ADMIN — METOPROLOL TARTRATE 25 MG: 25 TABLET ORAL at 09:05

## 2018-05-23 RX ADMIN — ESCITALOPRAM 20 MG: 20 TABLET, FILM COATED ORAL at 08:05

## 2018-05-23 RX ADMIN — IPRATROPIUM BROMIDE AND ALBUTEROL SULFATE 3 ML: .5; 3 SOLUTION RESPIRATORY (INHALATION) at 08:05

## 2018-05-23 RX ADMIN — TRAZODONE HYDROCHLORIDE 50 MG: 50 TABLET ORAL at 09:05

## 2018-05-23 RX ADMIN — IPRATROPIUM BROMIDE AND ALBUTEROL SULFATE 3 ML: .5; 3 SOLUTION RESPIRATORY (INHALATION) at 07:05

## 2018-05-23 RX ADMIN — ASPIRIN 81 MG 81 MG: 81 TABLET ORAL at 08:05

## 2018-05-23 RX ADMIN — TIMOLOL MALEATE 1 DROP: 5 SOLUTION OPHTHALMIC at 08:05

## 2018-05-23 RX ADMIN — IPRATROPIUM BROMIDE AND ALBUTEROL SULFATE 3 ML: .5; 3 SOLUTION RESPIRATORY (INHALATION) at 12:05

## 2018-05-23 NOTE — ED NOTES
[facility] Magdaleno returned call to say that they are currently looking for a private room for patient.

## 2018-05-23 NOTE — CONSULTS
[facility]  Palliative Medicine  Consult Note    Patient Name: Migue Fraser  MRN: [id number]  Admission Date: 5/22/2018  Hospital Length of Stay: 1 days  Code Status: DNR   Attending Provider: Ye Ogden MD  Consulting Provider: OMERO Gomez  Primary Care Physician: Iggy Telles MD  Principal Problem:Community acquired pneumonia of right lower lobe of lung    Patient information was obtained from spouse/SO and ER records.      Inpatient consult to Palliative Care  Consult performed by: TERRI MARQUIS  Consult ordered by: ALON ÁLVAREZ        Assessment/Plan:     Palliative care encounter    Impression: Pt is a 89 y/o male with advanced dementia. Pt is only oriented to person.  Pt appears comfortable. No distress noted.     Goals of care: Met with pt's wife who is at bedside. Per wife, pt has been declining. At this time, per wife, pt mostly bed-bound. Pt moderate-max assist when getting out of bed. Per wife, pt's appetite has decreased in last week or two. Per wife, pt sleeping more.   Per wife, she has been looking into home hospice care. Hospice education done with pt's wife and family friend at bedside.  Pt's wife would like pt discharged with hospice care once when attending team ready to d/c.     Called and spoke to Beth Miller,  about above conversation.     Plan:   1) Pt home hospice appropriate. Wife in agreement and would like home hospice upon d/c.  2) Wife to speak to pt's daughter about hospice agency choice.    3) Will follow.             Thank you for your consult. I will follow-up with patient. Please contact us if you have any additional questions.    Subjective:     HPI:   Pt is a 89 y.o. male with medical conditions including glaucoma, afib, sick sinus syndrome, cancer, dementia, who presented with complaint of generalized weakness and fatigue. Per chart review, the patient's family reports a change in his energy level. At  baseline, pt has a confused verbal response, requires redirection to take his medications, and ambulates independently. Over the past 4-5 days, pt has had decreased appetite and inability to walk and transport himself as he does normally at his baseline. The patient requires more support to get to the bathroom and to the table to eat. They report at baseline the patient does not drink water, only juice, and is difficult to get him to take his regular medications. They report decreased urination as well. The patient's family reports something very similar happened a month or 2 ago and after some fluids in the ED, he returned to normal. They report a slight cough when the patient lays on his side but state he is not having fever, chills, or vomiting.        Hospital Course:  No notes on file        Past Medical History:   Diagnosis Date    A-fib     Atrial fibrillation     Blastomycosis     Cancer     prostate CA    Dementia     Glaucoma     Sick sinus syndrome        Past Surgical History:   Procedure Laterality Date    APPENDECTOMY      carpel tunner      CHOLECYSTECTOMY      HERNIA REPAIR      JOINT REPLACEMENT         Review of patient's allergies indicates:   Allergen Reactions    Penicillins        Medications:  Continuous Infusions:  Scheduled Meds:   albuterol-ipratropium  3 mL Nebulization Q4H WAKE    aspirin  81 mg Oral Daily    azithromycin  250 mg Oral Daily    And    cefTRIAXone (ROCEPHIN) IVPB  1 g Intravenous Q24H    enoxaparin  40 mg Subcutaneous Daily    escitalopram oxalate  20 mg Oral Daily    latanoprost  1 drop Both Eyes Daily    metoprolol tartrate  25 mg Oral BID    timolol maleate 0.5%  1 drop Both Eyes BID    traZODone  50 mg Oral Nightly     PRN Meds:acetaminophen, ondansetron, sodium chloride 0.9%    Family History     None        Social History Main Topics    Smoking status: Never Smoker    Smokeless tobacco: Never Used    Alcohol use No    Drug use: No    Sexual  activity: Not on file       Review of Systems   Unable to perform ROS: Dementia     Objective:     Vital Signs (Most Recent):  Temp: 97 °F (36.1 °C) (05/23/18 0726)  Pulse: 77 (05/23/18 0844)  Resp: 18 (05/23/18 0844)  BP: (!) 136/91 (05/23/18 0726)  SpO2: 95 % (05/23/18 0844) Vital Signs (24h Range):  Temp:  [97 °F (36.1 °C)-98.9 °F (37.2 °C)] 97 °F (36.1 °C)  Pulse:  [] 77  Resp:  [15-24] 18  SpO2:  [81 %-100 %] 95 %  BP: ()/(68-95) 136/91        There is no height or weight on file to calculate BMI.    Review of Symptoms  Symptom Assessment (ESAS 0-10 scale)   ESAS 0 1 2 3 4 5 6 7 8 9 10   Pain              Dyspnea              Anxiety              Nausea              Depression               Anorexia              Fatigue              Insomnia              Restlessness               Agitation              CAM / Delirium __ --  ___+   Constipation     __ --  ___+   Diarrhea           __ --  ___+  Bowel Management Plan (BMP): No    Comments: Unable to do ROS- Pt has dementia      ECOG Performance Status Grade: 3 - Confined to bed or chair 50% of waking hours    Physical Exam   Constitutional: He appears cachectic. He has a sickly appearance.   Cardiovascular: Normal rate, regular rhythm, S1 normal and normal heart sounds.    Pulmonary/Chest: Effort normal and breath sounds normal.   Abdominal: Normal appearance and bowel sounds are normal.   Neurological: He is alert.   Oriented to person only   Skin: Skin is warm and dry.       Significant Labs: All pertinent labs within the past 24 hours have been reviewed.  CBC:     Recent Labs  Lab 05/23/18 0527   WBC 5.98   HGB 13.8*   HCT 41.2   *        BMP:    Recent Labs  Lab 05/23/18 0527         K 4.0   CL 99   CO2 29   BUN 16   CREATININE 0.9   CALCIUM 9.6   MG 1.9     LFT:  Lab Results   Component Value Date    AST 25 05/22/2018    ALKPHOS 108 05/22/2018    BILITOT 1.0 05/22/2018     Albumin:   Albumin   Date Value Ref Range  Status   05/22/2018 3.5 3.5 - 5.2 g/dL Final     Protein:   Total Protein   Date Value Ref Range Status   05/22/2018 7.9 6.0 - 8.4 g/dL Final     Lactic acid:   Lab Results   Component Value Date    LACTATE 1.5 12/18/2017       Significant Imaging: I have reviewed all pertinent imaging results/findings within the past 24 hours.    Advanced Directives::  Living Will: No  LaPOST: No  Do Not Resuscitate Status: Yes  Medical Power of : Wife is next of kin    Decision-Making Capacity: Family answered questions    Living Arrangements: Lives with spouse, Pt has 24 hour sitters at home.     Psychosocial/Cultural:  Pt lives at home with wife. Pt has 24/7 care-givers. Care-giver at bedside.  Pt is a retired .               > 50% of 70 min visit spent in chart review, face to face discussion of goals of care,  symptom assessment, coordination of care and emotional support.    Lisa Montoya, CNS  Palliative Medicine  [facility]

## 2018-05-23 NOTE — SUBJECTIVE & OBJECTIVE
Past Medical History:   Diagnosis Date    A-fib     Atrial fibrillation     Blastomycosis     Cancer     prostate CA    Dementia     Glaucoma     Sick sinus syndrome        Past Surgical History:   Procedure Laterality Date    APPENDECTOMY      carpel tunner      CHOLECYSTECTOMY      HERNIA REPAIR      JOINT REPLACEMENT         Review of patient's allergies indicates:   Allergen Reactions    Penicillins        Medications:  Continuous Infusions:  Scheduled Meds:   albuterol-ipratropium  3 mL Nebulization Q4H WAKE    aspirin  81 mg Oral Daily    azithromycin  250 mg Oral Daily    And    cefTRIAXone (ROCEPHIN) IVPB  1 g Intravenous Q24H    enoxaparin  40 mg Subcutaneous Daily    escitalopram oxalate  20 mg Oral Daily    latanoprost  1 drop Both Eyes Daily    metoprolol tartrate  25 mg Oral BID    timolol maleate 0.5%  1 drop Both Eyes BID    traZODone  50 mg Oral Nightly     PRN Meds:acetaminophen, ondansetron, sodium chloride 0.9%    Family History     None        Social History Main Topics    Smoking status: Never Smoker    Smokeless tobacco: Never Used    Alcohol use No    Drug use: No    Sexual activity: Not on file       Review of Systems   Unable to perform ROS: Dementia     Objective:     Vital Signs (Most Recent):  Temp: 97 °F (36.1 °C) (05/23/18 0726)  Pulse: 77 (05/23/18 0844)  Resp: 18 (05/23/18 0844)  BP: (!) 136/91 (05/23/18 0726)  SpO2: 95 % (05/23/18 0844) Vital Signs (24h Range):  Temp:  [97 °F (36.1 °C)-98.9 °F (37.2 °C)] 97 °F (36.1 °C)  Pulse:  [] 77  Resp:  [15-24] 18  SpO2:  [81 %-100 %] 95 %  BP: ()/(68-95) 136/91        There is no height or weight on file to calculate BMI.    Review of Symptoms  Symptom Assessment (ESAS 0-10 scale)   ESAS 0 1 2 3 4 5 6 7 8 9 10   Pain              Dyspnea              Anxiety              Nausea              Depression               Anorexia              Fatigue              Insomnia              Restlessness                Agitation              CAM / Delirium __ --  ___+   Constipation     __ --  ___+   Diarrhea           __ --  ___+  Bowel Management Plan (BMP): No    Comments: Unable to do ROS- Pt has dementia      ECOG Performance Status Grade: 3 - Confined to bed or chair 50% of waking hours    Physical Exam   Constitutional: He appears cachectic. He has a sickly appearance.   Cardiovascular: Normal rate, regular rhythm, S1 normal and normal heart sounds.    Pulmonary/Chest: Effort normal and breath sounds normal.   Abdominal: Normal appearance and bowel sounds are normal.   Neurological: He is alert.   Oriented to person only   Skin: Skin is warm and dry.       Significant Labs: All pertinent labs within the past 24 hours have been reviewed.  CBC:     Recent Labs  Lab 05/23/18 0527   WBC 5.98   HGB 13.8*   HCT 41.2   *        BMP:    Recent Labs  Lab 05/23/18 0527         K 4.0   CL 99   CO2 29   BUN 16   CREATININE 0.9   CALCIUM 9.6   MG 1.9     LFT:  Lab Results   Component Value Date    AST 25 05/22/2018    ALKPHOS 108 05/22/2018    BILITOT 1.0 05/22/2018     Albumin:   Albumin   Date Value Ref Range Status   05/22/2018 3.5 3.5 - 5.2 g/dL Final     Protein:   Total Protein   Date Value Ref Range Status   05/22/2018 7.9 6.0 - 8.4 g/dL Final     Lactic acid:   Lab Results   Component Value Date    LACTATE 1.5 12/18/2017       Significant Imaging: I have reviewed all pertinent imaging results/findings within the past 24 hours.    Advanced Directives::  Living Will: No  LaPOST: No  Do Not Resuscitate Status: Yes  Medical Power of : Wife is next of kin    Decision-Making Capacity: Family answered questions    Living Arrangements: Lives with spouse, Pt has 24 hour sitters at home.     Psychosocial/Cultural:  Pt lives at home with wife. Pt has 24/7 care-givers. Care-giver at bedside.  Pt is a retired .

## 2018-05-23 NOTE — HPI
Pt is a 89 y.o. male with medical conditions including glaucoma, afib, sick sinus syndrome, cancer, dementia, who presented with complaint of generalized weakness and fatigue. Per chart review, the patient's family reports a change in his energy level. At baseline, pt has a confused verbal response, requires redirection to take his medications, and ambulates independently. Over the past 4-5 days, pt has had decreased appetite and inability to walk and transport himself as he does normally at his baseline. The patient requires more support to get to the bathroom and to the table to eat. They report at baseline the patient does not drink water, only juice, and is difficult to get him to take his regular medications. They report decreased urination as well. The patient's family reports something very similar happened a month or 2 ago and after some fluids in the ED, he returned to normal. They report a slight cough when the patient lays on his side but state he is not having fever, chills, or vomiting.

## 2018-05-23 NOTE — PLAN OF CARE
Wife is not present in room but on way to hospital per caregiver at .  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     pcp is dr roseann lester.  Pt will have ride home upon d/c      Pending Palliative Care Consult. NP [facility]     05/23/18 0837   Discharge Assessment   Assessment Type Discharge Planning Assessment   Confirmed/corrected address and phone number on facesheet? Yes   Assessment information obtained from? Caregiver   Expected Length of Stay (days) 3   Communicated expected length of stay with patient/caregiver yes   Prior to hospitilization cognitive status: (hx dementia)   Current cognitive status: (hx dementia per night caregiver.)   Current Functional Status: Assistive Equipment;Needs Assistance   Lives With spouse;other (see comments)   Able to Return to Prior Arrangements yes   Is patient able to care for self after discharge? No   Who are your caregiver(s) and their phone number(s)? has 24/7 caregivers and wife.    ariel  [phone]  YOSEPH   [phone]   Readmission Within The Last 30 Days no previous admission in last 30 days   Patient currently being followed by outpatient case management? No   Patient currently receives any other outside agency services? No   Equipment Currently Used at Home cane, straight;walker, rolling   Do you have any problems affording any of your prescribed medications? No   Is the patient taking medications as prescribed? yes   Does the patient have transportation home? Yes   Transportation Available family or friend will provide   Does the patient receive services at the [facility]? No   Discharge Plan A Home with family;Hospice/home   Discharge Plan B Home with family;Home Health   Patient/Family In Agreement With Plan unable to assess    to check back later  when wife arrives to hospital.

## 2018-05-23 NOTE — SUBJECTIVE & OBJECTIVE
Interval History: wife at bedside, patient opens his eyes with one word response  Review of Systems   Reason unable to perform ROS: dementia.     Objective:     Vital Signs (Most Recent):  Temp: 97.7 °F (36.5 °C) (05/23/18 1525)  Pulse: 69 (05/23/18 1525)  Resp: 15 (05/23/18 1525)  BP: (!) 141/76 (05/23/18 1525)  SpO2: 98 % (05/23/18 1525) Vital Signs (24h Range):  Temp:  [96.3 °F (35.7 °C)-98.1 °F (36.7 °C)] 97.7 °F (36.5 °C)  Pulse:  [] 69  Resp:  [15-24] 15  SpO2:  [90 %-100 %] 98 %  BP: ()/(55-93) 141/76        There is no height or weight on file to calculate BMI.    Intake/Output Summary (Last 24 hours) at 05/23/18 1618  Last data filed at 05/23/18 1300   Gross per 24 hour   Intake             1720 ml   Output                0 ml   Net             1720 ml      Physical Exam   Constitutional: He appears well-developed and well-nourished.   HENT:   Head: Normocephalic and atraumatic.   Nose: Nose normal.   Mouth/Throat: Oropharynx is clear and moist. No oropharyngeal exudate.   Eyes: Pupils are equal, round, and reactive to light. Right eye exhibits no discharge. Left eye exhibits no discharge. No scleral icterus.   Neck: Normal range of motion. Neck supple. No JVD present.   Cardiovascular: Normal rate, normal heart sounds and intact distal pulses.    Irregularly irregular   Pulmonary/Chest: Effort normal and breath sounds normal. No respiratory distress. He has no wheezes. He exhibits no tenderness.   Abdominal: Soft. Bowel sounds are normal. He exhibits no distension. There is no tenderness. There is no guarding.   Musculoskeletal: Normal range of motion. He exhibits no edema, tenderness or deformity.   Lymphadenopathy:     He has no cervical adenopathy.   Neurological: He is alert. No cranial nerve deficit or sensory deficit.   Skin: Skin is warm and dry. No rash noted. No erythema.   Lipoma on back of neck   Psychiatric:   Minimal communication. Not oriented to person, place or time. Spends most  of interview sleeping       MELD-Na score: 8 at 5/23/2018  5:27 AM  MELD score: 8 at 5/23/2018  5:27 AM  Calculated from:  Serum Creatinine: 0.9 mg/dL (Rounded to 1) at 5/23/2018  5:27 AM  Serum Sodium: 138 mmol/L (Rounded to 137) at 5/23/2018  5:27 AM  Total Bilirubin: 1 mg/dL at 5/22/2018  3:05 PM  INR(ratio): 1.2 at 5/22/2018  3:05 PM  Age: 89 years    Significant Labs:  CBC:    Recent Labs  Lab 05/22/18  1505 05/23/18  0527   WBC 6.04 5.98   HGB 13.9* 13.8*   HCT 40.7 41.2    212     CMP:    Recent Labs  Lab 05/22/18  1505 05/23/18  0527    138   K 4.5 4.0   CL 98 99   CO2 28 29    108   BUN 17 16   CREATININE 0.8 0.9   CALCIUM 10.2 9.6   PROT 7.9  --    ALBUMIN 3.5  --    BILITOT 1.0  --    ALKPHOS 108  --    AST 25  --    ALT 11  --    ANIONGAP 11 10   EGFRNONAA >60.0 >60.0     PTINR:    Recent Labs  Lab 05/22/18  1505   INR 1.2       Significant Procedures:   Dobutamine Stress Test with Color Flow: No results found for this or any previous visit.    None

## 2018-05-23 NOTE — PLAN OF CARE
Per Gail with Palliative Care,  wife wants [facility]  MD to write  hospice orders  MSW Ricardo will make referral.    Left msg for wife- [phone] Rebekah

## 2018-05-23 NOTE — ASSESSMENT & PLAN NOTE
Decreased appetite, not able to complete ADLs for years   - family wishes to proceed with passages for home hospice   - palliative care on board

## 2018-05-23 NOTE — ASSESSMENT & PLAN NOTE
Impression: Pt is a 89 y/o male with advanced dementia. Pt is only oriented to person.  Pt appears comfortable. No distress noted.     Goals of care: Met with pt's wife who is at bedside. Per wife, pt has been declining. At this time, per wife, pt mostly bed-bound. Pt moderate-max assist when getting out of bed. Per wife, pt's appetite has decreased in last week or two. Per wife, pt sleeping more.   Per wife, she has been looking into home hospice care. Hospice education done with pt's wife and family friend at bedside.  Pt's wife would like pt discharged with hospice care once when attending team ready to d/c.     Called and spoke to Beth Miller,  about above conversation.     Plan:   1) Pt home hospice appropriate. Wife in agreement and would like home hospice upon d/c.  2) Wife to speak to pt's daughter about hospice agency choice.    3) Will follow.

## 2018-05-23 NOTE — H&P
[facility] Hospital Medicine  History & Physical    Patient Name: Migue Fraser  MRN: [id number]  Admission Date: 5/22/2018  Attending Physician: Ye Ogden MD   Primary Care Provider: Iggy Telles MD    [place] [facility] Medicine Team: [facility] HOSP MED A Ye Ogden MD     Patient information was obtained from patient and ER records.     Subjective:     Principal Problem:Community acquired pneumonia of right lower lobe of lung    Chief Complaint:   Chief Complaint   Patient presents with    Fatigue        HPI: This is a 89 y.o. male with medical conditions including glaucoma, afib, sick sinus syndrome, cancer, dementia, who presents with complaint of generalized weakness and fatigue. The patient's family reports a change in his energy level. At baseline, pt has a confused verbal response, requires redirection to take his medications, and ambulates independently. Over the past 4-5 days, pt has had decreased appetite and inability to walk and transport himself as he does normally at his baseline. The patient requires more support to get to the bathroom and to the table to eat. They report at baseline the patient does not drink water, only juice, and is difficult to get him to take his regular medications. They report decreased urination as well. The patient's family reports something very similar happened a month or 2 ago and after some fluids in the ED, he returned to normal. They report a slight cough when the patient lays on his side but state he is not having fever, chills, or vomiting.    Interval History: wife at bedside, patient opens his eyes with one word response  Review of Systems   Reason unable to perform ROS: dementia.     Objective:     Vital Signs (Most Recent):  Temp: 97.7 °F (36.5 °C) (05/23/18 1525)  Pulse: 69 (05/23/18 1525)  Resp: 15 (05/23/18 1525)  BP: (!) 141/76 (05/23/18 1525)  SpO2: 98 % (05/23/18 1525) Vital Signs (24h Range):  Temp:  [96.3 °F (35.7  °C)-98.1 °F (36.7 °C)] 97.7 °F (36.5 °C)  Pulse:  [] 69  Resp:  [15-24] 15  SpO2:  [90 %-100 %] 98 %  BP: ()/(55-93) 141/76        There is no height or weight on file to calculate BMI.    Intake/Output Summary (Last 24 hours) at 05/23/18 1618  Last data filed at 05/23/18 1300   Gross per 24 hour   Intake             1720 ml   Output                0 ml   Net             1720 ml      Physical Exam   Constitutional: He appears well-developed and well-nourished.   HENT:   Head: Normocephalic and atraumatic.   Nose: Nose normal.   Mouth/Throat: Oropharynx is clear and moist. No oropharyngeal exudate.   Eyes: Pupils are equal, round, and reactive to light. Right eye exhibits no discharge. Left eye exhibits no discharge. No scleral icterus.   Neck: Normal range of motion. Neck supple. No JVD present.   Cardiovascular: Normal rate, normal heart sounds and intact distal pulses.    Irregularly irregular   Pulmonary/Chest: Effort normal and breath sounds normal. No respiratory distress. He has no wheezes. He exhibits no tenderness.   Abdominal: Soft. Bowel sounds are normal. He exhibits no distension. There is no tenderness. There is no guarding.   Musculoskeletal: Normal range of motion. He exhibits no edema, tenderness or deformity.   Lymphadenopathy:     He has no cervical adenopathy.   Neurological: He is alert. No cranial nerve deficit or sensory deficit.   Skin: Skin is warm and dry. No rash noted. No erythema.   Lipoma on back of neck   Psychiatric:   Minimal communication. Not oriented to person, place or time. Spends most of interview sleeping       MELD-Na score: 8 at 5/23/2018  5:27 AM  MELD score: 8 at 5/23/2018  5:27 AM  Calculated from:  Serum Creatinine: 0.9 mg/dL (Rounded to 1) at 5/23/2018  5:27 AM  Serum Sodium: 138 mmol/L (Rounded to 137) at 5/23/2018  5:27 AM  Total Bilirubin: 1 mg/dL at 5/22/2018  3:05 PM  INR(ratio): 1.2 at 5/22/2018  3:05 PM  Age: 89 years    Significant  Labs:  CBC:    Recent Labs  Lab 05/22/18  1505 05/23/18  0527   WBC 6.04 5.98   HGB 13.9* 13.8*   HCT 40.7 41.2    212     CMP:    Recent Labs  Lab 05/22/18  1505 05/23/18  0527    138   K 4.5 4.0   CL 98 99   CO2 28 29    108   BUN 17 16   CREATININE 0.8 0.9   CALCIUM 10.2 9.6   PROT 7.9  --    ALBUMIN 3.5  --    BILITOT 1.0  --    ALKPHOS 108  --    AST 25  --    ALT 11  --    ANIONGAP 11 10   EGFRNONAA >60.0 >60.0     PTINR:    Recent Labs  Lab 05/22/18  1505   INR 1.2       Significant Procedures:   Dobutamine Stress Test with Color Flow: No results found for this or any previous visit.    None    Assessment/Plan:     * Community acquired pneumonia of right lower lobe of lung    -Ceftriaxone/azithromycin    - will be discharged on levofloxacin          Adult failure to thrive    Decreased appetite, not able to complete ADLs for years   - family wishes to proceed with passages for home hospice   - palliative care on board           Essential hypertension    - continue metoprolol        Acute sinusitis    - will discharge on levofloxacin due to PCN allergy        Late onset Alzheimer's disease without behavioral disturbance    Has 24 hour sitter at home   - needs assistance with meals          Atrial fibrillation    - continue metoprolol   -  Not on anti-coagulation at home           VTE Risk Mitigation         Ordered     enoxaparin injection 40 mg  Daily      05/22/18 2147     IP VTE HIGH RISK PATIENT  Once      05/22/18 2147     Place BITA hose  Until discontinued      05/22/18 2147     Place sequential compression device  Until discontinued      05/22/18 2147     IP VTE HIGH RISK PATIENT  Once      05/22/18 2147             YUMIKO BOSS MD  Hospital Medicine  Pager: [phone]  Spectralink: [phone]   Cell: [phone]

## 2018-05-23 NOTE — PLAN OF CARE
Palliative Care Social Work   Assessment  Name: Migue Fraser  MRN: [id number]  Date of Birth/Age:  11/21/1925  Sex: male  Ethnicity:     Primary Language:English   Needed: no    Attending Physician: Dr. Ogden  Reason for Referral: assistance with clarification of goals of care  Consult Order Date: 5/22/18 1039pm  Primary CM/SW: Beth Miller RN    Palliative Care Provider: PETER Moore    Present during Interview: pt, pt's wife, friend of family, [facility] Care APRN and this SW    Past & Current Medical Situation:   Diagnosis: Community acquired pneumonia of right lower lobe of lung  PMH:   Past Medical History:   Diagnosis Date    A-fib     Atrial fibrillation     Blastomycosis     Cancer     prostate CA    Dementia     Glaucoma     Sick sinus syndrome      Mental Health/Substance Use History: n/a  Non-traditional Health practices:     Understanding of diagnosis and prognosis: Good understanding of pt's advanced dementia. Will need continued support regarding prognosis.     Patients Mental Status: Sleeping during visit    Socio-Economic Factors/Resources:  Address: [address] [place] [id number]  Phone Number: [phone] (home)     Marital Status: 2nd Marriage. 48 years together.  Household Composition: Lives with wife.  Children:  Several children  Relationships with Family: Pt has supportive wife and children. Dtr Prema Fraser is main child who assist with care. Pt has multiple grandchildren.    Emergency Contacts:   Wife: Rebekah: [phone]  Dtr: Prema Fraser: [phone]  Dtr: Kristin Bruce: [phone]  Dtr: Sandra Guillen: [phone]    Activities of Daily Living: Assistance with ADLs.   Support Systems-Family & Community (Home Health, HME etc): 24 hour sitters. Straight cane, rolling walker. Used [employer] and [facility] in past    Transportation:  Relies on others    Work/Education History: Pt is a retired . He worked in Maritime law. He also  was a  at [employer].   History: no    Financial Resources:[employer]. [facility]      Advanced Care Planning & Legal Concerns:   Advanced Directives/Living Will: no   Planning:  no    Power of : no  Surrogate Decision Maker: Wife      Spirituality, Culture & Coping Mechanisms:  F- Maria Elena and Belief: Rastafari     I - Importance: Strong Maria Elena    C - Community/Culture Values:     A - Address in Care: Spiritual Care to follow      Strengths/Coping Strategies: family supportive  Self-Care Activities/Hobbies: Enjoys spending time with family    Goals/Hopes/Expectations: Return home  Fears/Anxiety/Concerns: Doesn't want pt to continue come back and forth to hospital. .        Preferences about EOL Environment: (own bed, family nearby, pets, music, etc)  Home    Complicated Bereavement Risk Assessment Tool (CBRAT)  Reference:  [facility] Clinical Practice Group (May 2016). Bereavement Risk Screening and Management Guidelines.  Retrieved from: http://www.grpcc.com.au/wp-content/uploads//CLAZI-Qjvgnigrgyq-Pauzskrhw-and-Management-Guideline-2016.pdf      Client Characteristics (Bereaved Client)  ? Under 18      no  ? Was a Twin   no  ? Young Spouse   no  ? Elderly Spouse    yes  ? Isolated    no  ? Lacks Meaningful Social Support   no  ? Dissatisfied with help available during illness   no  ? New to Financial [place] no  ? New to Decision-Making   no   History of Loss (Bereaved Client)  ? Cumulative Multiple Losses   no  ? Previous Mental Health Illnesses   no  ? Current Mental Health Illness   no  ? Other Significant Health Issues   no   ? Migrant/Refugee   no    Illness  ? Inherited Disorder   no  ? Stigmatized Disease in the   no  ?  Family/Community   no  ? Lengthy/Burdensome   yes Relationship with   ? Profound Lifelong Partner   no  ? Highly Dependent    yes  ? Antagonistic   no  ? Ambivalent    no  ? Deeply Connected   no  ? Culturally Defined   no   Death  ? Sudden or Unexpected   no  ? Traumatic Circumstances Associated with Death   no  ? Significant Cultural/Social Burdens as a result of Death   no   Risk Factors Scores  0-2  Low  3-5  Moderate  5+  High  All persons scoring moderate to high presume to be at risk**    (** It is acknowledged that protective factors and resilience may outweigh apparent risk factors.      Total Risk Factors Score:   Low    Will benefit from continued support and resources for pt's wife.           Discharge Planning Needs/Plan of Care:        Met with pt's wife at bedside. Wife verbalized that pt is declining and would like to pt to go home with hospice care. She explained that his appetite has decreased in last two weeks and pt sleeping more.     Hospice education completed. List of hospices given verbally to wife with few brochures.       hospitals Care APRN spoke with LISET Miller regarding conversation. Will follow as appropriate.       Denae Barnett LCSW, ACHP-SW

## 2018-05-23 NOTE — H&P
History and Physical  Hospital Medicine       Patient Name: Migue Fraser  MRN:  [id number]  Hospital Medicine Team: Networked reference to record PCT  Shahab Noel MD  Date of Admission:  5/22/2018     Principal Problem:  Community acquired pneumonia of right lower lobe of lung   Primary Care Physician: Iggy Telles MD      History of Present Illness:     This is a 89 y.o. male with medical conditions including glaucoma, afib, sick sinus syndrome, cancer, dementia, who presents with complaint of generalized weakness and fatigue. The patient's family reports a change in his energy level. At baseline, pt has a confused verbal response, requires redirection to take his medications, and ambulates independently. Over the past 4-5 days, pt has had decreased appetite and inability to walk and transport himself as he does normally at his baseline. The patient requires more support to get to the bathroom and to the table to eat. They report at baseline the patient does not drink water, only juice, and is difficult to get him to take his regular medications. They report decreased urination as well. The patient's family reports something very similar happened a month or 2 ago and after some fluids in the ED, he returned to normal. They report a slight cough when the patient lays on his side but state he is not having fever, chills, or vomiting.    Review of Systems     Unable able to obtain       Past Medical History: Patient has a past medical history of A-fib; Atrial fibrillation; Blastomycosis; Cancer; Dementia; Glaucoma; and Sick sinus syndrome.    Past Surgical History: Patient has a past surgical history that includes Joint replacement; Cholecystectomy; Hernia repair; Appendectomy; and carpel tunner.    Social History: Patient reports that he has never smoked. He has never used smokeless tobacco. He reports that he does not drink alcohol or use drugs.    Family History: family history is not on  file.    Medications: Scheduled Meds:   [START ON 5/23/2018] albuterol-ipratropium  3 mL Nebulization Q4H WAKE    [START ON 5/23/2018] aspirin  81 mg Oral Daily    [START ON 5/23/2018] cefTRIAXone (ROCEPHIN) IVPB  1 g Intravenous Q24H    And    [START ON 5/23/2018] azithromycin  500 mg Oral Daily    enoxaparin  40 mg Subcutaneous Daily    [START ON 5/23/2018] escitalopram oxalate  20 mg Oral Daily    [START ON 5/23/2018] latanoprost  1 drop Both Eyes Daily    metoprolol tartrate  25 mg Oral BID    timolol maleate 0.5%  1 drop Both Eyes BID    traZODone  50 mg Oral Nightly     Continuous Infusions:  PRN Meds:.acetaminophen, ondansetron, sodium chloride 0.9%    Allergies: Patient is allergic to penicillins.    Physical Exam:     Vital Signs (Most Recent):  Temp: 98.1 °F (36.7 °C) (05/22/18 2146)  Pulse: 68 (05/22/18 2146)  Resp: 18 (05/22/18 2146)  BP: 130/68 (05/22/18 2146)  SpO2: (!) 91 % (05/22/18 2146) Vital Signs Range (Last 24H):  Temp:  [98.1 °F (36.7 °C)-98.9 °F (37.2 °C)]   Pulse:  [68-83]   Resp:  [16-24]   BP: ()/(68-95)   SpO2:  [81 %-100 %]    There is no height or weight on file to calculate BMI.     Physical Exam:  Constitutional: weak and malnourished    Head: Normocephalic and atraumatic.   Mouth/Throat: Oropharynx is clear and moist.   Eyes: EOM are normal. Pupils are equal, round, and reactive to light. No scleral icterus.   Neck: Normal range of motion. Neck supple.   Cardiovascular: Normal rate and regular rhythm.  No murmur heard.  Pulmonary/Chest: Effort normal and breath sounds normal. No respiratory distress. No wheezes, rales, or rhonchi  Abdominal: Soft. Bowel sounds are normal.  No distension or tenderness  Musculoskeletal: Normal range of motion. No edema.   Neurological: Alert and oriented to person, place, and time.   Skin: Skin is warm and dry.   Psychiatric: Normal mood and affect. Behavior is normal.   Vitals reviewed.      Recent Labs  Lab 05/22/18  1505   WBC 6.04    HGB 13.9*   HCT 40.7            Recent Labs  Lab 05/22/18  1505      K 4.5   CL 98   CO2 28   BUN 17   CREATININE 0.8      CALCIUM 10.2   MG 2.4       Recent Labs  Lab 05/22/18  1505   ALKPHOS 108   ALT 11   AST 25   ALBUMIN 3.5   PROT 7.9   BILITOT 1.0   INR 1.2      No results for input(s): POCTGLUCOSE in the last 168 hours.      Assessment and Plan:     Mr. Migue Fraser is a 89 y.o. male who presented to [facility] on 5/22/2018 with     Active Hospital Problems    Diagnosis  POA    *Community acquired pneumonia of right lower lobe of lung [J18.1]  Yes    Acute encephalopathy [G93.40]  Yes    Acute sinusitis [J01.90]  Yes    Essential hypertension [I10]  Yes    Severe malnutrition [E43]  Yes    Late onset Alzheimer's disease without behavioral disturbance [G30.1, F02.80]  Yes    Atrial fibrillation [I48.91]  Yes      Resolved Hospital Problems    Diagnosis Date Resolved POA   No resolved problems to display.       # Community Acquired Pneumonia due to suspected strep PNA  - started on rocephin and azithromax, cont  - duonebs    # Acute sinusitis  - as seen on CT head; cont abx; would need to go home on abx that would cover both sinusitis and CAP,    # Severe malnutrition  - PAB, ensure and dietary    # Essential HTN  - cont home BP meds    # Alzheimer's disease without behavioral disturbance  - delirium precautions       - wife would like to talk about hospice for patient, will consult palliative care for the AM       Diet:  regular  GI PPx:    DVT PPx:    Goals of Care:  DNR      Disposition:  Possibly in two days with home hospice    Shahab Noel MD  Medical Director [facility] Medicine  Spectra:  [phone]  Pager: [phone]

## 2018-05-23 NOTE — PLAN OF CARE
10:44 AM  SW received notification that pt will need home hospice. Family has chosen [facility] [phone] . Faxed referral. Called and spoke with Brit at [facility] who stated they will be working on referral today.     Keisha Eli LMSW   [facility]  Ext [phone]

## 2018-05-23 NOTE — CONSULTS
Pt has decided to d/c on home Hospice.     pt meet criteria for moderate malnutrition.    Malnutrition in the context of Chronic Illness/Injury    Related to (etiology):  Advanced age  Decreased intake    Signs and Symptoms (as evidenced by):  Energy Intake: <50% of estimated energy requirement for 2 mo  Body Fat Depletion: moderate depletion of orbitals, triceps and thoracic and lumbar region   Muscle Mass Depletion: severe depletion of temples, clavicle region, scapular region, interosseous muscle and lower extremities   Fluid Accumulation: mild    Interventions/Recommendations (treatment strategy):  Addition of ONS as needed  Comfort feeding    Nutrition Diagnosis Status:  New

## 2018-05-23 NOTE — HPI
This is a 89 y.o. male with medical conditions including glaucoma, afib, sick sinus syndrome, cancer, dementia, who presents with complaint of generalized weakness and fatigue. The patient's family reports a change in his energy level. At baseline, pt has a confused verbal response, requires redirection to take his medications, and ambulates independently. Over the past 4-5 days, pt has had decreased appetite and inability to walk and transport himself as he does normally at his baseline. The patient requires more support to get to the bathroom and to the table to eat. They report at baseline the patient does not drink water, only juice, and is difficult to get him to take his regular medications. They report decreased urination as well. The patient's family reports something very similar happened a month or 2 ago and after some fluids in the ED, he returned to normal. They report a slight cough when the patient lays on his side but state he is not having fever, chills, or vomiting.

## 2018-05-24 LAB
ANION GAP SERPL CALC-SCNC: 11 MMOL/L
BASOPHILS # BLD AUTO: 0.04 K/UL
BASOPHILS NFR BLD: 0.6 %
BUN SERPL-MCNC: 19 MG/DL
CALCIUM SERPL-MCNC: 9.5 MG/DL
CHLORIDE SERPL-SCNC: 101 MMOL/L
CO2 SERPL-SCNC: 26 MMOL/L
CREAT SERPL-MCNC: 0.8 MG/DL
DIFFERENTIAL METHOD: ABNORMAL
EOSINOPHIL # BLD AUTO: 0.1 K/UL
EOSINOPHIL NFR BLD: 0.9 %
ERYTHROCYTE [DISTWIDTH] IN BLOOD BY AUTOMATED COUNT: 12.3 %
EST. GFR  (AFRICAN AMERICAN): >60 ML/MIN/1.73 M^2
EST. GFR  (NON AFRICAN AMERICAN): >60 ML/MIN/1.73 M^2
GLUCOSE SERPL-MCNC: 127 MG/DL
HCT VFR BLD AUTO: 38.3 %
HGB BLD-MCNC: 13 G/DL
IMM GRANULOCYTES # BLD AUTO: 0.02 K/UL
IMM GRANULOCYTES NFR BLD AUTO: 0.3 %
LYMPHOCYTES # BLD AUTO: 0.9 K/UL
LYMPHOCYTES NFR BLD: 14.7 %
MAGNESIUM SERPL-MCNC: 2 MG/DL
MCH RBC QN AUTO: 33.9 PG
MCHC RBC AUTO-ENTMCNC: 33.9 G/DL
MCV RBC AUTO: 100 FL
MONOCYTES # BLD AUTO: 0.6 K/UL
MONOCYTES NFR BLD: 9.5 %
NEUTROPHILS # BLD AUTO: 4.7 K/UL
NEUTROPHILS NFR BLD: 74 %
NRBC BLD-RTO: 0 /100 WBC
PHOSPHATE SERPL-MCNC: 3 MG/DL
PLATELET # BLD AUTO: 240 K/UL
PMV BLD AUTO: 9.5 FL
POTASSIUM SERPL-SCNC: 4 MMOL/L
RBC # BLD AUTO: 3.83 M/UL
SODIUM SERPL-SCNC: 138 MMOL/L
WBC # BLD AUTO: 6.32 K/UL

## 2018-05-24 PROCEDURE — 63600175 PHARM REV CODE 636 W HCPCS: Performed by: HOSPITALIST

## 2018-05-24 PROCEDURE — 25000003 PHARM REV CODE 250: Performed by: HOSPITALIST

## 2018-05-24 PROCEDURE — 25000242 PHARM REV CODE 250 ALT 637 W/ HCPCS: Performed by: HOSPITALIST

## 2018-05-24 PROCEDURE — 94640 AIRWAY INHALATION TREATMENT: CPT

## 2018-05-24 PROCEDURE — 83735 ASSAY OF MAGNESIUM: CPT

## 2018-05-24 PROCEDURE — 36415 COLL VENOUS BLD VENIPUNCTURE: CPT

## 2018-05-24 PROCEDURE — 80048 BASIC METABOLIC PNL TOTAL CA: CPT

## 2018-05-24 PROCEDURE — 85025 COMPLETE CBC W/AUTO DIFF WBC: CPT

## 2018-05-24 PROCEDURE — 94761 N-INVAS EAR/PLS OXIMETRY MLT: CPT

## 2018-05-24 PROCEDURE — 11000001 HC ACUTE MED/SURG PRIVATE ROOM

## 2018-05-24 PROCEDURE — 99232 SBSQ HOSP IP/OBS MODERATE 35: CPT | Mod: ,,, | Performed by: HOSPITALIST

## 2018-05-24 PROCEDURE — 84100 ASSAY OF PHOSPHORUS: CPT

## 2018-05-24 RX ORDER — ACETAMINOPHEN 325 MG/1
650 TABLET ORAL EVERY 4 HOURS PRN
Refills: 0 | COMMUNITY
Start: 2018-05-24

## 2018-05-24 RX ORDER — IPRATROPIUM BROMIDE AND ALBUTEROL SULFATE 2.5; .5 MG/3ML; MG/3ML
3 SOLUTION RESPIRATORY (INHALATION) EVERY 4 HOURS PRN
Qty: 1 BOX | Refills: 0 | Status: SHIPPED | OUTPATIENT
Start: 2018-05-24 | End: 2019-05-24

## 2018-05-24 RX ORDER — MOXIFLOXACIN HYDROCHLORIDE 400 MG/1
400 TABLET ORAL DAILY
Qty: 5 TABLET | Refills: 0 | Status: SHIPPED | OUTPATIENT
Start: 2018-05-24 | End: 2018-05-29

## 2018-05-24 RX ADMIN — TRAZODONE HYDROCHLORIDE 50 MG: 50 TABLET ORAL at 09:05

## 2018-05-24 RX ADMIN — CEFTRIAXONE SODIUM 1 G: 1 INJECTION, POWDER, FOR SOLUTION INTRAMUSCULAR; INTRAVENOUS at 05:05

## 2018-05-24 RX ADMIN — IPRATROPIUM BROMIDE AND ALBUTEROL SULFATE 3 ML: .5; 3 SOLUTION RESPIRATORY (INHALATION) at 07:05

## 2018-05-24 RX ADMIN — AZITHROMYCIN 250 MG: 250 TABLET, FILM COATED ORAL at 05:05

## 2018-05-24 RX ADMIN — IPRATROPIUM BROMIDE AND ALBUTEROL SULFATE 3 ML: .5; 3 SOLUTION RESPIRATORY (INHALATION) at 11:05

## 2018-05-24 RX ADMIN — ASPIRIN 81 MG 81 MG: 81 TABLET ORAL at 09:05

## 2018-05-24 RX ADMIN — IPRATROPIUM BROMIDE AND ALBUTEROL SULFATE 3 ML: .5; 3 SOLUTION RESPIRATORY (INHALATION) at 08:05

## 2018-05-24 RX ADMIN — ESCITALOPRAM 20 MG: 20 TABLET, FILM COATED ORAL at 09:05

## 2018-05-24 RX ADMIN — METOPROLOL TARTRATE 25 MG: 25 TABLET ORAL at 09:05

## 2018-05-24 RX ADMIN — LATANOPROST 1 DROP: 50 SOLUTION OPHTHALMIC at 09:05

## 2018-05-24 RX ADMIN — TIMOLOL MALEATE 1 DROP: 5 SOLUTION OPHTHALMIC at 09:05

## 2018-05-24 RX ADMIN — ENOXAPARIN SODIUM 40 MG: 100 INJECTION SUBCUTANEOUS at 05:05

## 2018-05-24 NOTE — PLAN OF CARE
Problem: Fall Risk (Adult)  Goal: Identify Related Risk Factors and Signs and Symptoms  Related risk factors and signs and symptoms are identified upon initiation of Human Response Clinical Practice Guideline (CPG)   Outcome: Ongoing (interventions implemented as appropriate)   05/24/18 1213   Fall Risk   Related Risk Factors (Fall Risk) age-related changes;bladder function altered;confusion/agitation;fatigue/slow reaction;gait/mobility problems;environment unfamiliar;sensory deficits

## 2018-05-24 NOTE — PROGRESS NOTES
[facility] Hospital Medicine  Progress Note    Patient Name: Migue Fraser  MRN: [id number]  Patient Class: IP- Inpatient   Admission Date: 5/22/2018  Length of Stay: 2 days  Attending Physician: Ye Ogden MD  Primary Care Provider: Iggy Telles MD    [facility] Medicine Team: [facility] Ye Ogden MD    Subjective:     Principal Problem:Community acquired pneumonia of right lower lobe of lung    HPI:  This is a 89 y.o. male with medical conditions including glaucoma, afib, sick sinus syndrome, cancer, dementia, who presents with complaint of generalized weakness and fatigue. The patient's family reports a change in his energy level. At baseline, pt has a confused verbal response, requires redirection to take his medications, and ambulates independently. Over the past 4-5 days, pt has had decreased appetite and inability to walk and transport himself as he does normally at his baseline. The patient requires more support to get to the bathroom and to the table to eat. They report at baseline the patient does not drink water, only juice, and is difficult to get him to take his regular medications. They report decreased urination as well. The patient's family reports something very similar happened a month or 2 ago and after some fluids in the ED, he returned to normal. They report a slight cough when the patient lays on his side but state he is not having fever, chills, or vomiting.    Hospital Course:  No notes on file    Interval History: opens eyes to auditory stimulus    Review of Systems   Reason unable to perform ROS: end stage dementia.     Objective:     Vital Signs (Most Recent):  Temp: 97.3 °F (36.3 °C) (05/24/18 1123)  Pulse: 83 (05/24/18 1500)  Resp: 16 (05/24/18 1152)  BP: 127/71 (05/24/18 1123)  SpO2: 95 % (05/24/18 1152) Vital Signs (24h Range):  Temp:  [97.3 °F (36.3 °C)-97.9 °F (36.6 °C)] 97.3 °F (36.3 °C)  Pulse:  [] 83  Resp:  [12-20]  16  SpO2:  [91 %-98 %] 95 %  BP: (127-152)/(71-87) 127/71        There is no height or weight on file to calculate BMI.    Intake/Output Summary (Last 24 hours) at 05/24/18 1518  Last data filed at 05/24/18 0600   Gross per 24 hour   Intake              360 ml   Output                0 ml   Net              360 ml      Physical Exam   Constitutional: He appears well-developed and well-nourished.   HENT:   Head: Normocephalic and atraumatic.   Nose: Nose normal.   Mouth/Throat: Oropharynx is clear and moist. No oropharyngeal exudate.   Eyes: Pupils are equal, round, and reactive to light. Right eye exhibits no discharge. Left eye exhibits no discharge. No scleral icterus.   Neck: Normal range of motion. Neck supple. No JVD present.   Cardiovascular: Normal heart sounds and intact distal pulses.    Irregularly irregular   Pulmonary/Chest: Effort normal and breath sounds normal. No respiratory distress. He has no wheezes. He exhibits no tenderness.   Abdominal: Soft. Bowel sounds are normal. He exhibits no distension. There is no tenderness. There is no guarding.   Musculoskeletal: Normal range of motion. He exhibits no edema, tenderness or deformity.   Lymphadenopathy:     He has no cervical adenopathy.   Neurological: He is alert. No cranial nerve deficit or sensory deficit.   Skin: Skin is warm and dry. No rash noted. No erythema.   Psychiatric:   Non verbal       MELD-Na score: 8 at 5/24/2018  3:34 AM  MELD score: 8 at 5/24/2018  3:34 AM  Calculated from:  Serum Creatinine: 0.8 mg/dL (Rounded to 1) at 5/24/2018  3:34 AM  Serum Sodium: 138 mmol/L (Rounded to 137) at 5/24/2018  3:34 AM  Total Bilirubin: 1 mg/dL at 5/22/2018  3:05 PM  INR(ratio): 1.2 at 5/22/2018  3:05 PM  Age: 89 years    Significant Labs:  CBC:    Recent Labs  Lab 05/23/18 0527 05/24/18  0334   WBC 5.98 6.32   HGB 13.8* 13.0*   HCT 41.2 38.3*    240     CMP:    Recent Labs  Lab 05/23/18 0527 05/24/18  0334    138   K 4.0 4.0   CL 99  101   CO2 29 26    127*   BUN 16 19   CREATININE 0.9 0.8   CALCIUM 9.6 9.5   ANIONGAP 10 11   EGFRNONAA >60.0 >60.0     PTINR:  No results for input(s): INR in the last 48 hours.    Significant Procedures:   Dobutamine Stress Test with Color Flow: No results found for this or any previous visit.    None    Assessment/Plan:      * Community acquired pneumonia of right lower lobe of lung    -Ceftriaxone/azithromycin    - will be discharged on levofloxacin          Adult failure to thrive    Decreased appetite, not able to complete ADLs for years   - family wishes to proceed with [facility]   - palliative care on board           Essential hypertension    - continue metoprolol        Acute sinusitis    - will discharge on levofloxacin due to PCN allergy        Late onset Alzheimer's disease without behavioral disturbance    Has 24 hour sitter at home   - needs assistance with meals          Atrial fibrillation    - continue metoprolol   -  Not on anti-coagulation at home           VTE Risk Mitigation         Ordered     enoxaparin injection 40 mg  Daily      05/22/18 2147     IP VTE HIGH RISK PATIENT  Once      05/22/18 2147     Place BITA hose  Until discontinued      05/22/18 2147     Place sequential compression device  Until discontinued      05/22/18 2147     IP VTE HIGH RISK PATIENT  Once      05/22/18 2147              Ye Ogden MD  Department of Hospital Medicine   [facility]

## 2018-05-24 NOTE — PROGRESS NOTES
Pt to d/c with [facility] per wife's choice. Pt has 24/7 sitters at home. [facility] will sign off at this time. Please call if plan of care changes.     Lisa PACHECO, APRN, AGCNS

## 2018-05-24 NOTE — PLAN OF CARE
Problem: Patient Care Overview  Goal: Plan of Care Review  Outcome: Ongoing (interventions implemented as appropriate)   05/24/18 1212   Coping/Psychosocial   Plan Of Care Reviewed With patient

## 2018-05-24 NOTE — SUBJECTIVE & OBJECTIVE
Interval History: per caregiver just finished eating and pt is now napping    Review of Systems   Reason unable to perform ROS: end stage dementia.     Objective:     Vital Signs (Most Recent):  Temp: 97.3 °F (36.3 °C) (05/24/18 1123)  Pulse: 83 (05/24/18 1500)  Resp: 16 (05/24/18 1152)  BP: 127/71 (05/24/18 1123)  SpO2: 95 % (05/24/18 1152) Vital Signs (24h Range):  Temp:  [97.3 °F (36.3 °C)-97.9 °F (36.6 °C)] 97.3 °F (36.3 °C)  Pulse:  [] 83  Resp:  [12-20] 16  SpO2:  [91 %-98 %] 95 %  BP: (127-152)/(71-87) 127/71        There is no height or weight on file to calculate BMI.    Intake/Output Summary (Last 24 hours) at 05/24/18 1514  Last data filed at 05/24/18 0600   Gross per 24 hour   Intake              360 ml   Output                0 ml   Net              360 ml      Physical Exam   Constitutional: He appears well-developed and well-nourished.   HENT:   Head: Normocephalic and atraumatic.   Nose: Nose normal.   Mouth/Throat: Oropharynx is clear and moist. No oropharyngeal exudate.   Eyes: Pupils are equal, round, and reactive to light. Right eye exhibits no discharge. Left eye exhibits no discharge. No scleral icterus.   Neck: Normal range of motion. Neck supple. No JVD present.   Cardiovascular: Normal heart sounds and intact distal pulses.    Irregularly irregular   Pulmonary/Chest: Effort normal and breath sounds normal. No respiratory distress. He has no wheezes. He exhibits no tenderness.   Abdominal: Soft. Bowel sounds are normal. He exhibits no distension. There is no tenderness. There is no guarding.   Musculoskeletal: Normal range of motion. He exhibits no edema, tenderness or deformity.   Lymphadenopathy:     He has no cervical adenopathy.   Neurological: He is alert. No cranial nerve deficit or sensory deficit.   Skin: Skin is warm and dry. No rash noted. No erythema.   Psychiatric:   Non verbal       MELD-Na score: 8 at 5/24/2018  3:34 AM  MELD score: 8 at 5/24/2018  3:34 AM  Calculated  from:  Serum Creatinine: 0.8 mg/dL (Rounded to 1) at 5/24/2018  3:34 AM  Serum Sodium: 138 mmol/L (Rounded to 137) at 5/24/2018  3:34 AM  Total Bilirubin: 1 mg/dL at 5/22/2018  3:05 PM  INR(ratio): 1.2 at 5/22/2018  3:05 PM  Age: 89 years    Significant Labs:  CBC:    Recent Labs  Lab 05/23/18  0527 05/24/18  0334   WBC 5.98 6.32   HGB 13.8* 13.0*   HCT 41.2 38.3*    240     CMP:    Recent Labs  Lab 05/23/18  0527 05/24/18  0334    138   K 4.0 4.0   CL 99 101   CO2 29 26    127*   BUN 16 19   CREATININE 0.9 0.8   CALCIUM 9.6 9.5   ANIONGAP 10 11   EGFRNONAA >60.0 >60.0     PTINR:  No results for input(s): INR in the last 48 hours.    Significant Procedures:   Dobutamine Stress Test with Color Flow: No results found for this or any previous visit.    None

## 2018-05-24 NOTE — PROGRESS NOTES
[facility] Hospital Medicine  Progress Note    Patient Name: Migue Fraser  MRN: [id number]  Patient Class: IP- Inpatient   Admission Date: 5/22/2018  Length of Stay: 2 days  Attending Physician: Ye Ogden MD  Primary Care Provider: Iggy Telles MD    [place] [facility] Medicine Team: [facility] MED A Ye Ogden MD    Subjective:     Principal Problem:Community acquired pneumonia of right lower lobe of lung    HPI:  This is a 89 y.o. male with medical conditions including glaucoma, afib, sick sinus syndrome, cancer, dementia, who presents with complaint of generalized weakness and fatigue. The patient's family reports a change in his energy level. At baseline, pt has a confused verbal response, requires redirection to take his medications, and ambulates independently. Over the past 4-5 days, pt has had decreased appetite and inability to walk and transport himself as he does normally at his baseline. The patient requires more support to get to the bathroom and to the table to eat. They report at baseline the patient does not drink water, only juice, and is difficult to get him to take his regular medications. They report decreased urination as well. The patient's family reports something very similar happened a month or 2 ago and after some fluids in the ED, he returned to normal. They report a slight cough when the patient lays on his side but state he is not having fever, chills, or vomiting.    Hospital Course:  No notes on file    Interval History: per caregiver just finished eating and pt is now napping    Review of Systems   Reason unable to perform ROS: end stage dementia.     Objective:     Vital Signs (Most Recent):  Temp: 97.3 °F (36.3 °C) (05/24/18 1123)  Pulse: 83 (05/24/18 1500)  Resp: 16 (05/24/18 1152)  BP: 127/71 (05/24/18 1123)  SpO2: 95 % (05/24/18 1152) Vital Signs (24h Range):  Temp:  [97.3 °F (36.3 °C)-97.9 °F (36.6 °C)] 97.3 °F (36.3 °C)  Pulse:  []  83  Resp:  [12-20] 16  SpO2:  [91 %-98 %] 95 %  BP: (127-152)/(71-87) 127/71        There is no height or weight on file to calculate BMI.    Intake/Output Summary (Last 24 hours) at 05/24/18 1514  Last data filed at 05/24/18 0600   Gross per 24 hour   Intake              360 ml   Output                0 ml   Net              360 ml      Physical Exam   Constitutional: He appears well-developed and well-nourished.   HENT:   Head: Normocephalic and atraumatic.   Nose: Nose normal.   Mouth/Throat: Oropharynx is clear and moist. No oropharyngeal exudate.   Eyes: Pupils are equal, round, and reactive to light. Right eye exhibits no discharge. Left eye exhibits no discharge. No scleral icterus.   Neck: Normal range of motion. Neck supple. No JVD present.   Cardiovascular: Normal heart sounds and intact distal pulses.    Irregularly irregular   Pulmonary/Chest: Effort normal and breath sounds normal. No respiratory distress. He has no wheezes. He exhibits no tenderness.   Abdominal: Soft. Bowel sounds are normal. He exhibits no distension. There is no tenderness. There is no guarding.   Musculoskeletal: Normal range of motion. He exhibits no edema, tenderness or deformity.   Lymphadenopathy:     He has no cervical adenopathy.   Neurological: He is alert. No cranial nerve deficit or sensory deficit.   Skin: Skin is warm and dry. No rash noted. No erythema.   Psychiatric:   Non verbal       MELD-Na score: 8 at 5/24/2018  3:34 AM  MELD score: 8 at 5/24/2018  3:34 AM  Calculated from:  Serum Creatinine: 0.8 mg/dL (Rounded to 1) at 5/24/2018  3:34 AM  Serum Sodium: 138 mmol/L (Rounded to 137) at 5/24/2018  3:34 AM  Total Bilirubin: 1 mg/dL at 5/22/2018  3:05 PM  INR(ratio): 1.2 at 5/22/2018  3:05 PM  Age: 89 years    Significant Labs:  CBC:    Recent Labs  Lab 05/23/18 0527 05/24/18 0334   WBC 5.98 6.32   HGB 13.8* 13.0*   HCT 41.2 38.3*    240     CMP:    Recent Labs  Lab 05/23/18 0527 05/24/18 0334    138    K 4.0 4.0   CL 99 101   CO2 29 26    127*   BUN 16 19   CREATININE 0.9 0.8   CALCIUM 9.6 9.5   ANIONGAP 10 11   EGFRNONAA >60.0 >60.0     PTINR:  No results for input(s): INR in the last 48 hours.    Significant Procedures:   Dobutamine Stress Test with Color Flow: No results found for this or any previous visit.    None    Assessment/Plan:      * Community acquired pneumonia of right lower lobe of lung    -Ceftriaxone/azithromycin    - will be discharged on levofloxacin          Adult failure to thrive    Decreased appetite, not able to complete ADLs for years   - family wishes to proceed with [facility]   - palliative care on board           Essential hypertension    - continue metoprolol        Acute sinusitis    - will discharge on levofloxacin due to PCN allergy        Late onset Alzheimer's disease without behavioral disturbance    Has 24 hour sitter at home   - needs assistance with meals          Atrial fibrillation    - continue metoprolol   -  Not on anti-coagulation at home           VTE Risk Mitigation         Ordered     enoxaparin injection 40 mg  Daily      05/22/18 2147     IP VTE HIGH RISK PATIENT  Once      05/22/18 2147     Place BITA hose  Until discontinued      05/22/18 2147     Place sequential compression device  Until discontinued      05/22/18 2147     IP VTE HIGH RISK PATIENT  Once      05/22/18 2147            YUMIKO BOSS MD  Hospital Medicine  Pager: [phone]  Spectralink: [id number]   Cell: [phone]

## 2018-05-24 NOTE — PLAN OF CARE
[facility]     Department of Hospital Medicine     [address] [place] [address]     [phone] after hours  [phone] fax                                   HOSPICE  ORDERS     05/24/2018    Admit to Hospice:  Home Service     Diagnoses:  Active Hospital Problems    Diagnosis  POA    *Community acquired pneumonia of right lower lobe of lung [J18.1]  Yes    Adult failure to thrive [R62.7]  Yes    Acute sinusitis [J01.90]  Yes    Essential hypertension [I10]  Yes    Late onset Alzheimer's disease without behavioral disturbance [G30.1, F02.80]  Yes    Atrial fibrillation [I48.91]  Yes      Resolved Hospital Problems    Diagnosis Date Resolved POA   No resolved problems to display.       Hospice Qualifying Diagnoses: End stage Alzheimer's       Patient has a life expectancy < 6 months due to these conditions.    Vital Signs: Routine per Hospice Protocol.    Allergies:  Review of patient's allergies indicates:   Allergen Reactions    Penicillins        Diet: regular diet     Activities: As tolerated    Nursing: Per Hospice Routine    Future Orders:  Hospice Medical Director may dictate new orders for comfortable care measures & sign death certificate.    Medications:         Comfort Care Medications Only     Migue Fraser   Home Medication Instructions PARADISE:[id number]    Printed on:05/24/18 5965   Medication Information                      acetaminophen (TYLENOL) 325 MG tablet  Take 2 tablets (650 mg total) by mouth every 4 (four) hours as needed.             albuterol-ipratropium (DUO-NEB) 2.5 mg-0.5 mg/3 mL nebulizer solution  Take 3 mLs by nebulization every 4 (four) hours as needed for Wheezing. Rescue             aspirin 81 MG Chew  Take 1 tablet (81 mg total) by mouth once daily. Start taking this on the 3/27             escitalopram oxalate (LEXAPRO) 20 MG tablet  take 1 tablet by mouth once daily             latanoprost 0.005 % ophthalmic  solution  Place 1 drop into both eyes once daily.              melatonin 3 mg Tab  Take by mouth.             metoprolol tartrate (LOPRESSOR) 12.5 MG tablet  take 1 tablet by mouth twice a day             moxifloxacin (AVELOX) 400 mg tablet  Take 1 tablet (400 mg total) by mouth once daily for 5 days             timolol maleate 0.5% (TIMOPTIC) 0.5 % Drop  Place 1 drop into both eyes 2 (two) times daily.             traZODone (DESYREL) 50 MG tablet  take 1 tablet by mouth at bedtime if needed for insomnia                         _________________________________  Ye Ogden MD  05/24/2018

## 2018-05-24 NOTE — SUBJECTIVE & OBJECTIVE
Interval History: opens eyes to auditory stimulus    Review of Systems   Reason unable to perform ROS: end stage dementia.     Objective:     Vital Signs (Most Recent):  Temp: 97.3 °F (36.3 °C) (05/24/18 1123)  Pulse: 83 (05/24/18 1500)  Resp: 16 (05/24/18 1152)  BP: 127/71 (05/24/18 1123)  SpO2: 95 % (05/24/18 1152) Vital Signs (24h Range):  Temp:  [97.3 °F (36.3 °C)-97.9 °F (36.6 °C)] 97.3 °F (36.3 °C)  Pulse:  [] 83  Resp:  [12-20] 16  SpO2:  [91 %-98 %] 95 %  BP: (127-152)/(71-87) 127/71        There is no height or weight on file to calculate BMI.    Intake/Output Summary (Last 24 hours) at 05/24/18 1518  Last data filed at 05/24/18 0600   Gross per 24 hour   Intake              360 ml   Output                0 ml   Net              360 ml      Physical Exam   Constitutional: He appears well-developed and well-nourished.   HENT:   Head: Normocephalic and atraumatic.   Nose: Nose normal.   Mouth/Throat: Oropharynx is clear and moist. No oropharyngeal exudate.   Eyes: Pupils are equal, round, and reactive to light. Right eye exhibits no discharge. Left eye exhibits no discharge. No scleral icterus.   Neck: Normal range of motion. Neck supple. No JVD present.   Cardiovascular: Normal heart sounds and intact distal pulses.    Irregularly irregular   Pulmonary/Chest: Effort normal and breath sounds normal. No respiratory distress. He has no wheezes. He exhibits no tenderness.   Abdominal: Soft. Bowel sounds are normal. He exhibits no distension. There is no tenderness. There is no guarding.   Musculoskeletal: Normal range of motion. He exhibits no edema, tenderness or deformity.   Lymphadenopathy:     He has no cervical adenopathy.   Neurological: He is alert. No cranial nerve deficit or sensory deficit.   Skin: Skin is warm and dry. No rash noted. No erythema.   Psychiatric:   Non verbal       MELD-Na score: 8 at 5/24/2018  3:34 AM  MELD score: 8 at 5/24/2018  3:34 AM  Calculated from:  Serum Creatinine: 0.8  mg/dL (Rounded to 1) at 5/24/2018  3:34 AM  Serum Sodium: 138 mmol/L (Rounded to 137) at 5/24/2018  3:34 AM  Total Bilirubin: 1 mg/dL at 5/22/2018  3:05 PM  INR(ratio): 1.2 at 5/22/2018  3:05 PM  Age: 89 years    Significant Labs:  CBC:    Recent Labs  Lab 05/23/18  0527 05/24/18  0334   WBC 5.98 6.32   HGB 13.8* 13.0*   HCT 41.2 38.3*    240     CMP:    Recent Labs  Lab 05/23/18 0527 05/24/18  0334    138   K 4.0 4.0   CL 99 101   CO2 29 26    127*   BUN 16 19   CREATININE 0.9 0.8   CALCIUM 9.6 9.5   ANIONGAP 10 11   EGFRNONAA >60.0 >60.0     PTINR:  No results for input(s): INR in the last 48 hours.    Significant Procedures:   Dobutamine Stress Test with Color Flow: No results found for this or any previous visit.    None

## 2018-05-24 NOTE — PLAN OF CARE
Problem: Coping, Compromised Individual (Adult,Obstetrics,Pediatric)  Goal: Identify Related Risk Factors and Signs and Symptoms  Related risk factors and signs and symptoms are identified upon initiation of Human Response Clinical Practice Guideline (CPG)   Outcome: Ongoing (interventions implemented as appropriate)   05/24/18 1213   Coping, Compromised Individual   Related Risk Factors (Compromised Individual Coping) cognitive impairment;perceptual impairment;sensory impairment   Signs and Symptoms (Compromised Individual Coping) decision-making/problem-solving deficit

## 2018-05-25 VITALS
DIASTOLIC BLOOD PRESSURE: 92 MMHG | RESPIRATION RATE: 20 BRPM | TEMPERATURE: 98 F | OXYGEN SATURATION: 88 % | SYSTOLIC BLOOD PRESSURE: 137 MMHG | HEART RATE: 84 BPM | HEIGHT: 66 IN

## 2018-05-25 LAB
ANION GAP SERPL CALC-SCNC: 8 MMOL/L
BASOPHILS # BLD AUTO: 0.04 K/UL
BASOPHILS NFR BLD: 0.6 %
BUN SERPL-MCNC: 18 MG/DL
CALCIUM SERPL-MCNC: 9.5 MG/DL
CHLORIDE SERPL-SCNC: 100 MMOL/L
CO2 SERPL-SCNC: 29 MMOL/L
CREAT SERPL-MCNC: 0.9 MG/DL
DIFFERENTIAL METHOD: ABNORMAL
EOSINOPHIL # BLD AUTO: 0.1 K/UL
EOSINOPHIL NFR BLD: 0.8 %
ERYTHROCYTE [DISTWIDTH] IN BLOOD BY AUTOMATED COUNT: 12.4 %
EST. GFR  (AFRICAN AMERICAN): >60 ML/MIN/1.73 M^2
EST. GFR  (NON AFRICAN AMERICAN): >60 ML/MIN/1.73 M^2
GLUCOSE SERPL-MCNC: 155 MG/DL
HCT VFR BLD AUTO: 39.4 %
HGB BLD-MCNC: 13.5 G/DL
IMM GRANULOCYTES # BLD AUTO: 0.03 K/UL
IMM GRANULOCYTES NFR BLD AUTO: 0.4 %
LYMPHOCYTES # BLD AUTO: 0.9 K/UL
LYMPHOCYTES NFR BLD: 11.7 %
MAGNESIUM SERPL-MCNC: 2 MG/DL
MCH RBC QN AUTO: 33.9 PG
MCHC RBC AUTO-ENTMCNC: 34.3 G/DL
MCV RBC AUTO: 99 FL
MONOCYTES # BLD AUTO: 0.8 K/UL
MONOCYTES NFR BLD: 10.5 %
NEUTROPHILS # BLD AUTO: 5.5 K/UL
NEUTROPHILS NFR BLD: 76 %
NRBC BLD-RTO: 0 /100 WBC
PHOSPHATE SERPL-MCNC: 2.6 MG/DL
PLATELET # BLD AUTO: 226 K/UL
PMV BLD AUTO: 9.4 FL
POTASSIUM SERPL-SCNC: 3.9 MMOL/L
RBC # BLD AUTO: 3.98 M/UL
SODIUM SERPL-SCNC: 137 MMOL/L
WBC # BLD AUTO: 7.26 K/UL

## 2018-05-25 PROCEDURE — 85025 COMPLETE CBC W/AUTO DIFF WBC: CPT

## 2018-05-25 PROCEDURE — 99239 HOSP IP/OBS DSCHRG MGMT >30: CPT | Mod: ,,, | Performed by: HOSPITALIST

## 2018-05-25 PROCEDURE — 25000003 PHARM REV CODE 250: Performed by: HOSPITALIST

## 2018-05-25 PROCEDURE — 25000242 PHARM REV CODE 250 ALT 637 W/ HCPCS: Performed by: HOSPITALIST

## 2018-05-25 PROCEDURE — 80048 BASIC METABOLIC PNL TOTAL CA: CPT

## 2018-05-25 PROCEDURE — 84100 ASSAY OF PHOSPHORUS: CPT

## 2018-05-25 PROCEDURE — 36415 COLL VENOUS BLD VENIPUNCTURE: CPT

## 2018-05-25 PROCEDURE — 83735 ASSAY OF MAGNESIUM: CPT

## 2018-05-25 RX ORDER — METOPROLOL TARTRATE 25 MG/1
12.5 TABLET, FILM COATED ORAL 2 TIMES DAILY
Qty: 30 TABLET | Refills: 11 | Status: SHIPPED | OUTPATIENT
Start: 2018-05-25 | End: 2019-05-25

## 2018-05-25 RX ADMIN — LATANOPROST 1 DROP: 50 SOLUTION OPHTHALMIC at 09:05

## 2018-05-25 RX ADMIN — TIMOLOL MALEATE 1 DROP: 5 SOLUTION OPHTHALMIC at 09:05

## 2018-05-25 RX ADMIN — IPRATROPIUM BROMIDE AND ALBUTEROL SULFATE 3 ML: .5; 3 SOLUTION RESPIRATORY (INHALATION) at 09:05

## 2018-05-25 NOTE — NURSING
DC instructions reviewed with patient's care taker, verbalizes understanding. IV DCed, patient tolerated well. Patient cleaned and transferred to [facility]. VS WN. Belongings sent with patient.

## 2018-05-25 NOTE — PHYSICIAN QUERY
PT Name: Migue Fraser  MR #: [id number]    Physician Query Form - Atrial Fibrillation Specificity     CDS/: Ameena Conti     RN CCDS          Contact information:[email or web address]     This form is a permanent document in the medical record.     Query Date: May 25, 2018    By submitting this query, we are merely seeking further clarification of documentation. Please utilize your independent clinical judgment when addressing the question(s) below.    The medical record contains the following:   Indicators     Supporting Clinical Findings Location in Medical Record   x Atrial Fibrillation Atrial Fibrillation- continue metoprolol  Not on anticoagulation at home H&P   x EKG results Atrial fibrillation HR 78 EKG 5/22   x Medication Metoprolol MAR    Treatment      Other         Provider, please further specify the Atrial Fibrillation diagnosis.    [  ] Chronic  [ x ] Paroxysmal  [  ] Permanent  [  ] Persistent  [  ] Other (please specify): ____________________________  [  ] Clinically Undetermined    Please document in your progress notes daily for the duration of treatment until resolved, and include in your discharge summary.

## 2018-05-25 NOTE — PLAN OF CARE
Problem: Patient Care Overview  Goal: Plan of Care Review  Outcome: Ongoing (interventions implemented as appropriate)  No acute events overnight. Alert and oriented to self when awake, slept between care. Patient understands and agrees with plan of care. Vitals stable and within patient's baseline since admission on room air. Diligent coughing, deep breathing encouraged. No pain or nausea reported overnight. Urine output adequate overnight. No BM overnight. Private sitter at bedside. Instructed to call for help as needed, call light in reach. Bed in lowest position and brake set. Frequent rounds made for patient's safety. See flowsheets for detailed assessment. White board in patient's room updated accordingly. Continuing to monitor closely.

## 2018-05-25 NOTE — DISCHARGE SUMMARY
[facility] Hospital Medicine  Discharge Summary      Patient Name: Migue Fraser  MRN: [id number]  Admission Date: 5/22/2018  Hospital Length of Stay: 3 days  Discharge Date and Time:  05/25/2018 3:58 PM  Attending Physician: No att. providers found   Discharging Provider: Ye Ogden MD  Primary Care Provider: Iggy Telles MD  [facility] Medicine Team: [facility] [place] Ye Ogden MD    HPI:   This is a 89 y.o. male with medical conditions including glaucoma, afib, sick sinus syndrome, cancer, dementia, who presents with complaint of generalized weakness and fatigue. The patient's family reports a change in his energy level. At baseline, pt has a confused verbal response, requires redirection to take his medications, and ambulates independently. Over the past 4-5 days, pt has had decreased appetite and inability to walk and transport himself as he does normally at his baseline. The patient requires more support to get to the bathroom and to the table to eat. They report at baseline the patient does not drink water, only juice, and is difficult to get him to take his regular medications. They report decreased urination as well. The patient's family reports something very similar happened a month or 2 ago and after some fluids in the ED, he returned to normal. They report a slight cough when the patient lays on his side but state he is not having fever, chills, or vomiting.    * No surgery found *      Hospital Course:   Patient was found to have right sided infiltrate on xray, so was started on treatment for PNA. Wife asked that patient be discharged home with hospice due to failure to thrive for the past few months.     Consults:   Consults         Status Ordering Provider     Inpatient consult to Palliative Care  Once     Provider:  (Not yet assigned)    Completed ALON ÁLVAREZ     Inpatient consult to Registered Dietitian/Nutritionist  Once     Provider:  (Not yet  assigned)    Completed ALON ÁLVAREZ          * Community acquired pneumonia of right lower lobe of lung    -Ceftriaxone/azithromycin    - will be discharged on levofloxacin          Adult failure to thrive    Decreased appetite, not able to complete ADLs for years   - family wishes to proceed with [facility] for home hospice   - palliative care on board           Essential hypertension    - continue metoprolol        Acute sinusitis    - will discharge on levofloxacin due to PCN allergy        Late onset Alzheimer's disease without behavioral disturbance    Has 24 hour sitter at home   - needs assistance with meals          Atrial fibrillation    - continue metoprolol   -  Not on anti-coagulation at home           Final Active Diagnoses:    Diagnosis Date Noted POA    PRINCIPAL PROBLEM:  Community acquired pneumonia of right lower lobe of lung [J18.1] 05/22/2018 Yes    Adult failure to thrive [R62.7] 05/23/2018 Yes    Acute sinusitis [J01.90] 05/22/2018 Yes    Essential hypertension [I10] 05/22/2018 Yes    Late onset Alzheimer's disease without behavioral disturbance [G30.1, F02.80] 04/09/2018 Yes    Atrial fibrillation [I48.91] 01/03/2014 Yes      Problems Resolved During this Admission:    Diagnosis Date Noted Date Resolved POA       Discharged Condition: good    Disposition: Hospice/Home    Follow Up:    Patient Instructions:   No discharge procedures on file.    Vitals:    05/25/18 0814 05/25/18 0951 05/25/18 1125 05/25/18 1205   BP: 107/68  (!) 137/92    BP Location:   Left arm    Patient Position: Lying  Lying    Pulse: 90 93 (!) 111 84   Resp: 20 16 20    Temp: 98.4 °F (36.9 °C)  98.4 °F (36.9 °C)    TempSrc: Oral  Oral    SpO2: (!) 88% (!) 92% (!) 88%    Height:         Physical Exam   Constitutional: He appears well-developed and well-nourished.   HENT:   Head: Normocephalic and atraumatic.   Nose: Nose normal.   Mouth/Throat: Oropharynx is clear and moist. No oropharyngeal exudate.   Eyes: Pupils are  equal, round, and reactive to light. Right eye exhibits no discharge. Left eye exhibits no discharge. No scleral icterus.   Neck: Normal range of motion. Neck supple. No JVD present.   Cardiovascular: Normal heart sounds and intact distal pulses.    Irregularly irregular   Pulmonary/Chest: Effort normal and breath sounds normal. No respiratory distress. He has no wheezes. He exhibits no tenderness.   Abdominal: Soft. Bowel sounds are normal. He exhibits no distension. There is no tenderness. There is no guarding.   Musculoskeletal: Normal range of motion. He exhibits no edema, tenderness or deformity.   Lymphadenopathy:     He has no cervical adenopathy.   Neurological: He is alert. No cranial nerve deficit or sensory deficit.   Skin: Skin is warm and dry. No rash noted. No erythema.   Psychiatric:   Non verbal     Significant Diagnostic Studies: Labs:   BMP:   Recent Labs  Lab 05/24/18  0334 05/25/18  0353   * 155*    137   K 4.0 3.9    100   CO2 26 29   BUN 19 18   CREATININE 0.8 0.9   CALCIUM 9.5 9.5   MG 2.0 2.0    and CBC   Recent Labs  Lab 05/24/18  0334 05/25/18  0353   WBC 6.32 7.26   HGB 13.0* 13.5*   HCT 38.3* 39.4*    226       Pending Diagnostic Studies:     None         Medications:  Reconciled Home Medications:      Medication List      START taking these medications    acetaminophen 325 MG tablet  Commonly known as:  TYLENOL  Take 2 tablets (650 mg total) by mouth every 4 (four) hours as needed.     albuterol-ipratropium 2.5 mg-0.5 mg/3 mL nebulizer solution  Commonly known as:  DUO-NEB  Take 3 mLs by nebulization every 4 (four) hours as needed for Wheezing. Rescue     moxifloxacin 400 mg tablet  Commonly known as:  AVELOX  Take 1 tablet (400 mg total) by mouth once daily.        CHANGE how you take these medications    metoprolol tartrate 25 MG tablet  Commonly known as:  LOPRESSOR  Take 0.5 tablets (12.5 mg total) by mouth 2 (two) times daily.  What changed:  See the new  instructions.        CONTINUE taking these medications    aspirin 81 MG Chew  Take 1 tablet (81 mg total) by mouth once daily. Start taking this on the 3/27     escitalopram oxalate 20 MG tablet  Commonly known as:  LEXAPRO  take 1 tablet by mouth once daily     latanoprost 0.005 % ophthalmic solution  Place 1 drop into both eyes once daily.     melatonin 3 mg Tab  Take by mouth.     timolol maleate 0.5% 0.5 % Drop  Commonly known as:  TIMOPTIC  Place 1 drop into both eyes 2 (two) times daily.     traZODone 50 MG tablet  Commonly known as:  DESYREL  take 1 tablet by mouth at bedtime if needed for insomnia        STOP taking these medications    SHINGRIX (PF) 50 mcg/0.5 mL injection  Generic drug:  varicella-zoster [id number] (PF)            Indwelling Lines/Drains at time of discharge:   Lines/Drains/Airways          No matching active lines, drains, or airways          Time spent on the discharge of patient: 40 minutes  Patient was seen and examined on the date of discharge and determined to be suitable for discharge.         Ye Ogden MD  Department of Hospital Medicine  [facility]

## 2018-05-25 NOTE — PLAN OF CARE
Per MD and ASHLEY Silva in [place], dc today with home hospice (passages).  Cm notified wife who is waiting at house.  ASHLEY Silva is setting up transport.  Nurse Tal requests Keisha call him:Tal at x [phone]     05/25/18 1023   Final Note   Assessment Type Final Discharge Note   Discharge Disposition [facility] Follow Up  Appt(s) scheduled? Yes   Discharge plans and expectations educations in teach back method with documentation complete? Yes   Right Care Referral Info   Post Acute Recommendation Other

## 2018-05-25 NOTE — TELEPHONE ENCOUNTER
Attempted to call [facility]'s [facility] Alexandra unable to reach.

## 2018-05-25 NOTE — PLAN OF CARE
10:33 AM  ISABELLA received hospice orders. Faxed hospice orders to [facility] hospice.     Arranged transportation with [facility]. [facility] auth is [id number].     Called Jim with [facility] hospice to inform him pt has transport scheduled for home.     Keisha Eli LMSW   [facility]  Ext [phone]

## 2018-05-25 NOTE — NURSING
Contacted Med Team A about patient's DNR chart consent. Also contacted Med Team A about patient's BP @ 107/68; orders to hold Metoprolol, will continue to monitor.

## 2018-05-25 NOTE — PHYSICIAN QUERY
PT Name: Migue Fraser  MR #: [id number]     Physician Query Form - Documentation Clarification      CDS/: Ameena Conti  RN CCDS             Contact information:[email or web address]    This form is a permanent document in the medical record.     Query Date: May 25, 2018    By submitting this query, we are merely seeking further clarification of documentation. Please utilize your independent clinical judgment when addressing the question(s) below.    The Medical record reflects the following:    Supporting Clinical Findings Location in Medical Record     Pt admitted for altered mental status    Patient's BNP is at baseline of 266. Troponin is negative, CBC and CMP without significant abnormalities. UA and Utox clear. CXR shows right lower lobe pneumonia. Will obtain cultures and treat with ceftriaxone and azithromycin for CAP and will admit.      On reassessment status post 1L bolus, pt alert at this time. GCS improved to 14. Patient tolerating PO. Not septic. Stable for medical floor.   ER MD note     Acute encephalopathy    Pneumonia     Severe malnutrition    Late onset Alzheimer's disease without behavioral disturbances       H&P                                                                            Please clarify the encephalopathy diagnosis.  Thank you.    Provider Use Only    [ x  ] Metabolic encephalopathy    [   ] Other encephalopathy    [   ] Encephalopathy ruled out                                                                                                                           [  ] Clinically undetermined

## 2018-05-27 LAB
BACTERIA BLD CULT: NORMAL
BACTERIA BLD CULT: NORMAL

## 2018-06-04 NOTE — PHYSICIAN QUERY
PT Name: Migue Fraser  MR #: [id number]     Physician Query Form - Diagnosis Clarification      CDS/: Sunita Robles RN              Contact information: [phone]    This form is a permanent document in the medical record.     Query Date: June 4, 2018    By submitting this query, we are merely seeking further clarification of documentation.  Please utilize your independent clinical judgment when addressing the question(s) below.     The medical record contains the following:      Findings Supporting Clinical Information Location in Medical Record   Strep PNA Community Acquired Pneumonia due to suspected strep PNA   - started on rocephin and azithromax, cont   - duonebs     Patient was found to have right sided infiltrate on xray, so was started on treatment for PNA.    Increased ground-glass opacity overlying the right hemithorax likely layering of pleural fluid.  There may be a developing infiltrate at the right base.      Azithromycin  IVPB            Indications of use:  Pneumonia  Ceftriaxone IVPB                Indications of use:  Pneumonia     5/22 Hp              5/22 CXR          5/23-24 MAR  5/22- 24 MAR     Please clarify if the strep PNA diagnosis has been:    [x  ] Ruled In  [  ] Ruled In, Now Resolved  [  ] Ruled Out  [  ] Clinically insignificant  [  ] Clinically undetermined  [  ] Other/Clarification of findings (please specify)_______________________________    Please document in your progress notes daily for the duration of treatment, until resolved, and include in your discharge summary.

## 2018-08-27 PROBLEM — J01.90 ACUTE SINUSITIS: Status: RESOLVED | Noted: 2018-05-22 | Resolved: 2018-08-27

## 2018-10-10 RX ORDER — TRAZODONE HYDROCHLORIDE 50 MG/1
TABLET ORAL
Qty: 30 TABLET | Refills: 5 | Status: SHIPPED | OUTPATIENT
Start: 2018-10-10

## 2020-10-05 ENCOUNTER — PATIENT MESSAGE (OUTPATIENT)
Dept: ADMINISTRATIVE | Facility: HOSPITAL | Age: 85
End: 2020-10-05

## 2021-01-04 ENCOUNTER — PATIENT MESSAGE (OUTPATIENT)
Dept: ADMINISTRATIVE | Facility: HOSPITAL | Age: 86
End: 2021-01-04

## 2021-04-05 ENCOUNTER — PATIENT MESSAGE (OUTPATIENT)
Dept: ADMINISTRATIVE | Facility: HOSPITAL | Age: 86
End: 2021-04-05

## 2021-04-16 NOTE — TELEPHONE ENCOUNTER
----- Message -----     From: Migue Fraser     Sent: 4/10/2018  7:47 AM CDT       To: Iggy Telles MD  Subject: Visit Follow-Up    Good morning,   I noticed for the first time my fathers diagnosis for yesterdays visit read late onset Alzheimers   without behavioral disturbances.  In the ER on 3-25-18  the diagnosis  read unspecified dementia  without behavioral disturbances.   Can you comment on this?  Lastly, the ER test results were listed as unremarkable with the exception of enlarged heart   with further descriptions.   I or my sister were not aware of this.  Can you comment?  Thank you for your time and care.  Prema Fraser       Please advise, patients kids are unclear on their fathers diagnosis.  Thank you   RENETTA Okeefe  
,DirectAddress_Unknown

## 2022-12-01 NOTE — DISCHARGE SUMMARY
[facility] Hospital Medicine  Discharge Summary      Patient Name: Migue Fraser  MRN: [id number]  Admission Date: 12/17/2017  Hospital Length of Stay: 1 days  Discharge Date and Time:  12/19/2017 11:20 AM  Attending Physician: Юлия Archibald MD   Discharging Provider: Brett Gorman MD  Primary Care Provider: Iggy Telles MD  Hospital Medicine Team: [facility] HOSP MED 1 Brett Gorman MD    HPI:   89 y/o M Pmhx Alzheimer's, A fib on ASA only, prostate cancer s/p treatment, glaucoma who presented w/ emesis since 4 AM. Per wife patient has not been able to keep anything down all day and stated emesis was blackish green in color. Patient himself stated yes to every question asked even when not an appropriate response. Wife endorsed possible subjective fever, as well as cough x 1-2 days. In addition, also states poor appetite x 1 month. She denied constipation, diarrhea, sick contacts, SOB until today.    * No surgery found *      Hospital Course:   Pt presented 12/17 with reports of emesis and cough, tachycardia (), and radiographic evidence of aspiration pneumonia. Pt was started on Moxifloxacin 400mg PO qd. Blood cultures negative. Influenza negative. Pt's vitals have remained stable. 12/19 Pt has been medically optimized and is ready for discharge home with Home Health. Pt will continue course of Moxi for aspiration pneumonia for a total of 7 days.     Consults:     No new Assessment & Plan notes have been filed under this hospital service since the last note was generated.  Service: Hospital Medicine    Final Active Diagnoses:    Diagnosis Date Noted POA    PRINCIPAL PROBLEM:  Aspiration pneumonia of right middle lobe [J69.0] 12/18/2017 Yes    Full code status [Z78.9] 12/18/2017 Yes    Glaucoma [H40.9] 12/18/2017 Yes    Debility [R53.81] 03/17/2017 Yes    Atrial fibrillation [I48.91] 01/03/2014 Yes      Problems Resolved During this Admission:    Diagnosis Date Noted Date  Problem: Discharge Planning  Goal: Discharge to home or other facility with appropriate resources  Outcome: Progressing  Flowsheets (Taken 12/1/2022 0809)  Discharge to home or other facility with appropriate resources: Refer to discharge planning if patient needs post-hospital services based on physician order or complex needs related to functional status, cognitive ability or social support system     Problem: Safety - Adult  Goal: Free from fall injury  Outcome: Progressing     Problem: Neurosensory - Adult  Goal: Achieves stable or improved neurological status  Outcome: Progressing  Flowsheets (Taken 12/1/2022 0809)  Achieves stable or improved neurological status: Assess for and report changes in neurological status  Goal: Achieves maximal functionality and self care  Outcome: Progressing  Flowsheets (Taken 12/1/2022 0809)  Achieves maximal functionality and self care: Monitor swallowing and airway patency with patient fatigue and changes in neurological status     Problem: Skin/Tissue Integrity - Adult  Goal: Skin integrity remains intact  Outcome: Progressing  Flowsheets (Taken 12/1/2022 0809)  Skin Integrity Remains Intact: Monitor for areas of redness and/or skin breakdown     Problem: Pain  Goal: Verbalizes/displays adequate comfort level or baseline comfort level  Outcome: Progressing     Problem: Chronic Conditions and Co-morbidities  Goal: Patient's chronic conditions and co-morbidity symptoms are monitored and maintained or improved  Outcome: Progressing  Flowsheets (Taken 12/1/2022 0809)  Care Plan - Patient's Chronic Conditions and Co-Morbidity Symptoms are Monitored and Maintained or Improved: Monitor and assess patient's chronic conditions and comorbid symptoms for stability, deterioration, or improvement     Problem: ABCDS Injury Assessment  Goal: Absence of physical injury  Outcome: Progressing     Problem: Skin/Tissue Integrity  Goal: Absence of new skin breakdown  Description: 1.   Monitor for Resolved POA       Discharged Condition: good    Disposition: Home or Self Care    Follow Up:  Follow-up Information     Iggy Telles MD.    Specialty:  Family Medicine  Contact information:  [address]  [facility] [id number]  [phone]                 Patient Instructions:     Diet general     Activity as tolerated     Call MD for:  temperature >100.4     Call MD for:  persistent nausea and vomiting or diarrhea     Call MD for:  severe uncontrolled pain     Call MD for:  redness, tenderness, or signs of infection (pain, swelling, redness, odor or green/yellow discharge around incision site)     Call MD for:  difficulty breathing or increased cough     Call MD for:  severe persistent headache     Call MD for:  worsening rash     Call MD for:  persistent dizziness, light-headedness, or visual disturbances     Call MD for:  increased confusion or weakness       /74 (BP Location: Right arm, Patient Position: Lying)   Pulse 76   Temp 97.5 °F (36.4 °C) (Oral)   Resp 16   SpO2 (!) 92%         Significant Diagnostic Studies:   CBC:   Recent Labs  Lab 12/17/17 1926 12/18/17 0355 12/19/17 0457   WBC 7.77 8.30 6.93   RBC 3.83* 3.49* 3.36*   HGB 13.0* 12.1* 11.7*   HCT 38.3* 36.2* 34.5*    152 145*   * 104* 103*   MCH 33.9* 34.7* 34.8*   MCHC 33.9 33.4 33.9     BMP:   Recent Labs  Lab 12/17/17 1926 12/18/17  0355 12/19/17 0457   * 125* 104    141 140   K 3.9 4.2 4.0    103 104   CO2 25 31* 26   BUN 20 20 28   CREATININE 0.9 0.9 0.9   CALCIUM 9.2 8.8 9.1     Coagulation:   Recent Labs  Lab 12/17/17 1926   INR 1.1     Physical Exam   Constitutional: No distress.   Cachectic appearing  HENT:   Head: Normocephalic and atraumatic.   Eyes: Conjunctivae and EOM are normal. No scleral icterus.   Cardiovascular: Intact distal pulses.    Irregularly irregular   Pulmonary/Chest: No respiratory distress. He has wheezes. He has rales. He exhibits no tenderness.   Abdominal:  areas of redness and/or skin breakdown  2. Assess vascular access sites hourly  3. Every 4-6 hours minimum:  Change oxygen saturation probe site  4. Every 4-6 hours:  If on nasal continuous positive airway pressure, respiratory therapy assess nares and determine need for appliance change or resting period. Outcome: Progressing     Problem: Confusion  Goal: Confusion, delirium, dementia, or psychosis is improved or at baseline  Description: INTERVENTIONS:  1. Assess for possible contributors to thought disturbance, including medications, impaired vision or hearing, underlying metabolic abnormalities, dehydration, psychiatric diagnoses, and notify attending LIP  2. [place] high risk fall precautions, as indicated  3. Provide frequent short contacts to provide reality reorientation, refocusing and direction  4. Decrease environmental stimuli, including noise as appropriate  5. Monitor and intervene to maintain adequate nutrition, hydration, elimination, sleep and activity  6. If unable to ensure safety without constant attention obtain sitter and review sitter guidelines with assigned personnel  7.  Initiate Psychosocial CNS and Spiritual Care consult, as indicated  Outcome: Progressing  Flowsheets (Taken 12/1/2022 0809)  Effect of thought disturbance (confusion, delirium, dementia, or psychosis) are managed with adequate functional status:   Assess for contributors to thought disturbance, including medications, impaired vision or hearing, underlying metabolic abnormalities, dehydration, psychiatric diagnoses, notify LIP   Provide frequent short contacts to provide reality reorientation, refocusing and direction   [place] high risk fall precautions, as indicated   Decrease environmental stimuli, including noise as appropriate     Problem: Musculoskeletal - Adult  Goal: Return mobility to safest level of function  Outcome: Progressing  Flowsheets (Taken 12/1/2022 0809)  Return Mobility to Safest Level of Function:   Assess patient stability and activity tolerance for standing, transferring and ambulating with or without assistive devices   Assist with transfers and ambulation using safe patient handling equipment as needed   Ensure adequate protection for wounds/incisions during mobilization  Goal: Return ADL status to a safe level of function  Outcome: Progressing  Flowsheets (Taken 12/1/2022 0809)  Return ADL Status to a Safe Level of Function: Administer medication as ordered     Problem: Gastrointestinal - Adult  Goal: Maintains or returns to baseline bowel function  Outcome: Progressing  Flowsheets (Taken 12/1/2022 0809)  Maintains or returns to baseline bowel function:   Assess bowel function   Encourage oral fluids to ensure adequate hydration  Goal: Maintains adequate nutritional intake  Outcome: Progressing  Flowsheets (Taken 12/1/2022 0809)  Maintains adequate nutritional intake: Assist with meals as needed     Problem: Metabolic/Fluid and Electrolytes - Adult  Goal: Electrolytes maintained within normal limits  Outcome: Progressing  Flowsheets (Taken 12/1/2022 0809)  Electrolytes maintained within normal limits: Monitor labs and assess patient for signs and symptoms of electrolyte imbalances     Problem: Nutrition Deficit:  Goal: Optimize nutritional status  Outcome: Progressing       Electronically signed by Quinn Esparza [facility] on 12/1/2022 at 11:40 AM Soft. He exhibits no distension. There is no tenderness. There is no rebound and no guarding.   Musculoskeletal: He exhibits no edema or tenderness.   Neurological:   Oriented x 1   Skin: Skin is warm. No rash noted.   Psychiatric:   demented     Microbiology Results (last 7 days)     Procedure Component Value Units Date/Time    Blood Culture #1 **CANNOT BE ORDERED STAT** [[id number]] Collected:  12/18/17 0102    Order Status:  Completed Specimen:  Blood from Peripheral, Wrist, Right Updated:  12/19/17 0613     Blood Culture, Routine No Growth to date     Blood Culture, Routine No Growth to date    Blood Culture #2 **CANNOT BE ORDERED STAT** [[id number]] Collected:  12/18/17 0058    Order Status:  Completed Specimen:  Blood from Peripheral, Forearm, Right Updated:  12/19/17 0613     Blood Culture, Routine No Growth to date     Blood Culture, Routine No Growth to date    Culture, Respiratory with Gram Stain [[id number]]     Order Status:  No result Specimen:  Respiratory from Sputum             Pending Diagnostic Studies:     None         Medications:  Reconciled Home Medications:   Current Discharge Medication List      START taking these medications    Details   metoprolol tartrate (LOPRESSOR) 25 MG tablet Take 1 tablet (25 mg total) by mouth 2 (two) times daily.  Qty: 60 tablet, Refills: 3      moxifloxacin (AVELOX) 400 mg tablet Take 1 tablet (400 mg total) by mouth once daily.  Qty: 5 tablet, Refills: 0         CONTINUE these medications which have NOT CHANGED    Details   aspirin 81 MG Chew Take 1 tablet (81 mg total) by mouth once daily. 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       melatonin 3 mg Tab Take by mouth.      timolol maleate 0.5%  (TIMOPTIC) 0.5 % Drop Place 1 drop into both eyes 2 (two) times daily.  Refills: 1      trazodone (DESYREL) 50 MG tablet take 1 tablet by mouth at bedtime if needed for insomnia  Qty: 30 tablet, Refills: 5             Indwelling Lines/Drains at time of discharge:   Lines/Drains/Airways          No matching active lines, drains, or airways          Time spent on the discharge of patient: 30 minutes  Patient was seen and examined on the date of discharge and determined to be suitable for discharge.         Brett Gorman MD  Department of Hospital Medicine  [facility]

## 2024-08-19 NOTE — DISCHARGE SUMMARY
DISCHARGE SUMMARY  Hospital Medicine    Team: [facility] HOSP MED 5    Patient Name: Migue Fraser  YOB: 1925    Admit Date: 3/10/2017    Discharge Date: 03/21/2017    Discharge Attending Physician: Alex Delgado MD     Diagnoses:  Active Hospital Problems    Diagnosis  POA    *Traumatic subarachnoid hematoma with loss of consciousness [S06.6X9A]  Yes    Contusion of right side of brain without loss of consciousness [S06.310A]  Unknown    Debility [R53.81]  Yes    SAH (subarachnoid hemorrhage) [I60.9]  Yes    Agitation requiring sedation protocol [R45.1]  Yes    Contusion of left side of brain without loss of consciousness [S06.320A]  Yes    Cerebral contusion [S06.339A]  Yes    Abnormal coagulation profile [R79.1]  Yes    Current use of long term anticoagulation [Z79.01]  Not Applicable    Atrial fibrillation [I48.91]  Yes    Sick sinus syndrome [I49.5]  Yes      Resolved Hospital Problems    Diagnosis Date Resolved POA    Hypophosphatemia [E83.39] 03/19/2017 Yes    Hypokalemia [E87.6] 03/18/2017 Yes    SAH (subarachnoid hemorrhage) [I60.9] 03/11/2017 Yes       Discharged Condition: admit problems have stabilized.    HOSPITAL COURSE:    Initial Presentation:    89 yo M w PMHx of SSS and A-fib (on Elaquis), prostate cancer, and dementia who experienced an unwitnessed fall at home. Pt was found down in his back yard by his son. CT scan demonstrated multiple parenchymal hemorrhages with superimposed SAH. He has dementia at baseline but according to his son he was markedly more confused than usual on presentation.     Course of Principle Problem for Admission:    On admission, He was administered PCC. Neurosurgery was consulted. Repeat CT scans showed a stable small intracranial sub arachnoid hemorrhages. No acute NSGY was performed. Hospital course was remarkable for agitation requiring an intermittent precedex ggt which was discontinued and transitioned to seroquel BID with PRN  haldol. Home metoprolol was restarted for afib. He was stepped down to the floor on 3/17/2017. Repeat CT scan ruled out any further progressions. Revaluated by NSGY 2/2 an acute change in his mental status. However, was found to be at his baseline waxing and waning mental status. Seen by SLP 2/2 concerns for aspiration risk. He was cleared from swallowing perspective as long as feeding occurs while awake and not sleepy. He was discharged home with home health. He was instructed to stop his home elaquis due to his risk of fall and high risk of bleeding. Plan is to restart aspirin on 3/27/17 to provide some coverage given his high chadsvasc/has bled scores.  He was instructed to follow up with Dr. Iggy Telles on 3/22/17 with family medicine for close hospital follow up.    Other Medical Problems Addressed in the Hospital:      Agitation   -- on Seroquel regularly and haldol prn         Debility  -- PT/O      Hypophosphatemia  -- phosphate replacement was provided as necessary.     CONSULTS:     Neurosurgery  Nutrition  Physical Medicine and Rehab   Pharmacy  PT  OT    Day of discharge Lab Findings:        Recent Labs  Lab 03/21/17  0359   WBC 7.66   RBC 3.62*   HGB 12.7*   HCT 36.8*      *   MCH 35.1*   MCHC 34.5       Recent Labs  Lab 03/21/17  0359      K 3.9   CL 97   CO2 28   BUN 24   CREATININE 0.8   MG 2.0       Pertinent/Significant Diagnostic Studies:      Imaging Results         CT Head Without Contrast (Final result) Result time:  03/17/17 11:53:47    Final result by GUERO Alonzo (03/17/17 11:53:47)    Impression:         Evolution of multicompartmental hemorrhages, overall less conspicuous and improved in comparison to prior.    Stable mild generalized cervical volume loss and chronic ischemic changes.    No new infarct or hemorrhage identified.  ______________________________________     Electronically signed by resident: GRIS HOWELL MD  Date:      03/17/17  Time:    11:44            As the supervising and teaching physician, I personally reviewed the images and resident's interpretation and I agree with the findings.          Electronically signed by: LEWIS DECKER MD  Date:     03/17/17  Time:    11:53     Narrative:    CT head without contrast    03/17/17 10:34:39    Accession# [id number]    CLINICAL INDICATION: 89 year old M with hx of subarachnoid hemorrhage, with worsening mental status.     TECHNIQUE: Axial CT images obtained throughout the region of the head without the use of intravenous contrast. Axial, sagittal and coronal reconstructions were performed.    COMPARISON: CT head 3/15/2017.    FINDINGS:    The ventricles are stable in size without evidence of hydrocephalus.    Interval decrease in size of the predominantly low attenuation subdural collection overlying the left cerebral hemisphere, now measuring 3 mm. There is minimal associated mass effect on the subjacent parenchyma and minimal rightward midline shift.    There has been expected evolution of the scattered multi-compartmental intracranial hemorrhage with decreased conspicuity of the scattered subarachnoid hemorrhage as well as the prior left occipital intraparenchymal hematoma. No new intra-axial hemorrhage is identified. There are moderate chronic microvascular ischemic changes. There is a remote right caudate head and thalamic lacunar-type infarcts. No new focal infarct.    The cranium appears intact.            X-Ray Chest 1 View (Final result) Result time:  03/17/17 10:34:19    Final result by Cem Mattson MD (03/17/17 10:34:19)    Impression:     No active process.      Electronically signed by: KELLEN MATTSON MD  Date:     03/17/17  Time:    10:34     Narrative:    Single view chest, comparison to 1117.  Cardiomegaly stable.  Lungs clear.            CT Head Without Contrast (Final result) Result time:  03/15/17 06:34:20    Final result by Chava Myers MD  (03/15/17 06:34:20)    Impression:        Slight interval enlargement of the left-sided extra-axial fluid collection with stable local mass effect and minimal left-to-right midline shift.    Evolving multicompartmental blood products as above.      Electronically signed by: JOAN PHAM MD  Date:     03/15/17  Time:    06:34     Narrative:    Technique: 5-mm axial images were obtained through the head without the use of IV contrast. Coronal and sagittal reformats were performed.    Comparison: CT 03/12/2017.    Findings:    There has been slight interval enlargement of a mixed attenuation, extra-axial fluid collection overlying the left cerebral hemisphere now measuring 7 mm in maximum examination with stable resulting mass effect on the underlying brain parenchyma and minimal left to right midline shift of 2 mm (previously 2 mm).    Evolving multicompartmental blood products are again identified throughout the bilateral cerebral hemispheres noting small bilateral parenchymal hemorrhages and trace scattered subarachnoid hemorrhage.  No definite intraventricular hemorrhage.  Ventricular system is similar in size when compared to the previous exam.  There is moderate generalized cerebral volume loss.    No CT findings to suggest acute infarction.  Prominent patchy periventricular white matter hypoattenuation identified, likely sequela of chronic microvascular ischemic change.  Remote right basal ganglia and thalamic lacunar-type infarcts noted.    Subcutaneous hematoma is overlying the left frontal calvarium.  No definite calvarial fractures.            CT Cervical Spine Without Contrast (Final result) Result time:  03/12/17 18:29:14    Final result by Scotty Kirkpatrick MD (03/12/17 18:29:14)    Impression:       No evidence of fracture or listhesis involving the cervical spine.    Multilevel degenerative changes of the cervical spine most prominent involving the lower cervical spine.    4 cm sebaceous cyst in the lower  occipital region.            Electronically signed by: PEMA HODGES MD  Date:     03/12/17  Time:    18:29     Narrative:    Exam: [id number]  03/12/17  17:03:00 [id number] ([facility]) : CT CERVICAL SPINE WITHOUT CONTRAST    Technique:     2.5 mm transaxial images of the cervical spine without contrast.  Sagittal and coronal reformatted images also reviewed.      Comparison:     None     Findings:      The examination is slightly degraded by patient motion.    There is straightening of the normal cervical lordosis, otherwise the alignment of the cervical spine is unremarkable. The vertebral body heights are maintained.  There is loss of intervertebral disc height at the C5-C6 and C6-C7 levels.  There are extensive osteoarthritic changes involving the dens.  There is facet hypertrophy and uncovertebral hypertrophy at multiple levels.  There are calcifications involving an nuchal ligament.  There is no evidence of fracture involving the cervical spine.     There is a sebaceous cyst in the lower occipital region measuring 4.0 x 2.4 cm.  There is no evidence of lymphadenopathy in the neck.  There are calcifications involving the carotid vessels.  There are emphysematous changes in the lung apices.  There is no evidence of a pneumothorax.            CT Head Without Contrast (Final result) Result time:  03/12/17 19:26:04    Final result by Alyssa Barbosa MD (03/12/17 19:26:04)    Impression:       New extra-axial low attenuation collection along the left frontal and parietal convexities.  The findings may represent component of acute subdural hygroma versus evolving subdural hematoma.  Close followup is suggested.    Stable appearance of scattered subarachnoid hemorrhage and parenchymal contusion.    Remote lacunar infarction of the right caudate head and right thalamus.    Generalized cerebral volume loss with chronic microvascular ischemic change.    Continued soft tissue swelling/hematoma over the left frontal  calvarium.    Stable to mild improvement in sinus disease.      Preliminary report discussed with YAW Beard with neuro critical care, by Dr. Barbosa at 18:59:41 on Sunday, March 12, 2017.  ______________________________________     Electronically signed by resident: JAVY BARBOSA MD  Date:     03/12/17  Time:    19:01            As the supervising and teaching physician, I personally reviewed the images and resident's interpretation and I agree with the findings.          Electronically signed by: PEMA HODGES MD  Date:     03/12/17  Time:    19:26     Narrative:    CT brain without contrast.    Clinical Indication: Status post fall with multifocal parenchymal hemorrhage    Comparison: CT head without contrast 3/1/2017    Technique: Multiple 5 mm axial images of the head were obtained with coronal and sagittal reformatting without intravenous contrast.      Findings:   Redemonstration of scattered serpiginous areas of hyperattenuating material consistent with scattered subarachnoid hemorrhage in addition to hyperattenuating foci compatible with parenchymal contusion.  These appear similar to prior examination without detrimental interval change.  There is increased prominence of hyperattenuating material over the left cerebral convexity extending over the frontal and parietal lobes, with some element of hyperattenuation in this region.  This is consistent with new or increasing subdural hematoma.  No evidence of mass effect, midline shift, or hydrocephalus.    Hypodensities within the right thalamus and right caudate head are again identified, compatible with remote lacunar type infarction.  No evidence of acute intracranial infarction identified.    Hypodensity of the periventricular white matter is consistent with chronic microvascular ischemic change.  There is generalized cerebral volume loss with diffuse sulcal widening and compensatory enlargement of the ventricular system.    Continued soft tissue  swelling/hematoma over the left frontal calvarium.    Slight interval improvement in opacification of the right maxillary antrum with continued patchy opacification of the ethmoid air cells.  The remaining paranasal sinuses and mastoid air cells are clear.            CT Limited (Final result) Result time:  03/12/17 03:14:00    Procedure changed from CT Cervical Spine Without Contrast        Final result by Annabel Ramirez MD (03/12/17 03:14:00)    Impression:     See above  ______________________________________     Electronically signed by resident: ANNABEL RAMIREZ MD  Date:     03/12/17  Time:    03:10            As the supervising and teaching physician, I personally reviewed the images and resident's interpretation and I agree with the findings.          Electronically signed by: LETICIA NEVAREZ MD  Date:     03/12/17  Time:    03:14     Narrative:    CT limited    Technique: Single  image of the cervical spine for CT localization    Findings: CT exam terminated due to patient inability to remain still despite maximum sedation provided.  Nondiagnostic  image.            X-Ray Chest 1 View (Final result) Result time:  03/11/17 14:36:55    Final result by Lucille Ly MD (03/11/17 14:36:55)    Impression:     No significant interval change.      Electronically signed by: LUCILLE LY MD  Date:     03/11/17  Time:    14:36     Narrative:    Portable AP chest x-ray    Comparison: 12/31/13    Findings: There is prominence of perihilar interstitial markings.  No consolidation, pleural effusion, or pneumothorax.   Cardiac silhouette is prominent.            CT Head Without Contrast (Final result) Result time:  03/11/17 03:32:35    Final result by Sena Pollard MD (03/11/17 03:32:35)    Impression:        Please note evaluation is degraded by motion and beam hardening artifacts particularly about the posterior fossa.    Grossly stable multifocal parenchymal contusions with superimposed component of  subarachnoid hemorrhage.  Please note, there is improved evaluation of the left aspect of the posterior fossa, noting additional subarachnoid hemorrhage/contusion in the region.  No evidence of significant mass effect,, midline shift, or hydrocephalus.  Possible trace blood within the posterior horn of the left lateral ventricle versus adjacent subarachnoid hemorrhage, the latter favored.  Continued followup is recommended.    Soft tissue swelling/hematoma overlying the left frontal calvarium.      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    Additional findings as above.      ______________________________________     Electronically signed by resident: CLYDE GALLAGHER MD  Date:     03/11/17  Time:    03:25            As the supervising and teaching physician, I personally reviewed the images and resident's interpretation and I agree with the findings.          Electronically signed by: LETICIA NEVAREZ MD  Date:     03/11/17  Time:    03:32     Narrative:    Technique: Axial images of the head were acquired without the administration of contrast.  Sagittal and coronal reformatted images were also obtained.    Clinical indication: Followup.    Comparison: CT head 3/10/17.    Findings:    Please note exam is degraded by motion and beam hardening artifact especially that of the posterior fossa.    Once again identified are areas of serpiginous abnormal high attenuation, most compatible with multifocal subarachnoid hemorrhage.  Additionally, several areas of parenchymal high attenuation are noted, compatible with parenchymal contusion.  Overall, the extent and distribution is grossly stable as compared to the previous exam noting a slightly more conspicuous focus along the lateral margin of the left temporal lobe, previously obscured by motion artifact on the previous examination.  No evidence of significant mass  effect, midline shift or hydrocephalus.      There is no evidence of acute or recent major vascular territory cerebral infarction. Remote lacunar type infarct in the right caudate head and the right thalamus.      There is generalized cerebral volume loss and compensatory enlargement of the ventricular system and sulci.  There is a focus of either parenchymal contusion, or possibly interventricular hemorrhage within the posterior aspect of the left lateral ventricle versus within the occipital lobe itself, evaluation is limited given patient motion although not significantly changed as compared to previous examination.  There is patchy and confluent regions of hypoattenuation scattered throughout the supratentorial white matter likely sequela of chronic microvascular ischemic changes.        There is soft tissue swelling/hematoma overlying the left frontal calvarium.  The osseous structures and remaining extracranial soft tissues are unremarkable.      There are mucous retention cysts or polyps within the bilateral maxillary sinuses with minimal layering aerated secretions within the right maxillary sinus.  There is patchy opacification of the ethmoid sinuses bilaterally. The remaining visualized paranasal sinuses and mastoid air cells are clear.            CT Head Without Contrast (Final result)    Abnormal Result time:  03/10/17 23:06:21    Final result by Sena Pollard MD (03/10/17 23:06:21)    Impression:         Motion limited exam.    Several areas of parenchymal contusion with superimposed component of subarachnoid hemorrhage, as above.  No evidence of significant mass effect, midline shift or hydrocephalus.  Continued followup is recommended.    Soft tissue swelling/hematoma overlying the left frontal calvarium.      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    Additional findings as  above.      This report has been flagged in the [facility] medical record.        ______________________________________     Electronically signed by resident: CLYDE GALLAGHER MD  Date:     03/10/17  Time:    22:58            As the supervising and teaching physician, I personally reviewed the images and resident's interpretation and I agree with the findings.          Electronically signed by: LETICIA NEVAREZ MD  Date:     03/10/17  Time:    23:06     Narrative:    Technique: Axial images of the head were acquired without the administration of contrast.  Sagittal and coronal reformatted images were also obtained.    Clinical indication: Fall.    Comparison: None.      Findings:      Limited exam secondary to patient motion.    There are multifocal regions of abnormal high attenuation, most of which appear relatively serpiginous and suggest multifocal subarachnoid hemorrhage in the setting of trauma.  Of note, a few of which, particularly within the posterior right frontal lobe, anterior left frontal lobe, and posterior left parietal lobe likely represent a combination of subarachnoid hemorrhage and parenchymal contusion.  Motion artifact limits evaluation of the posterior fossa although along the lateral margins of cerebellum, additional foci of subarachnoid hemorrhage and/or contusion may be present, particularly on the left and also involving the posterior left temporal lobe.  No evidence of significant mass effect, midline shift or hydrocephalus.       There is no evidence of acute or recent major vascular territory cerebral infarction.   There is generalized cerebral volume loss and compensatory enlargement of the ventricular system and sulci.  Additionally, patchy and confluent hypoattenuation is scattered throughout the supratentorial white matter, nonspecific but may represent sequela of chronic microvascular ischemic changes.  Punctate hypoattenuation is noted within the anterior aspect of the right caudate  suggesting remote lacunar infarct, as well as involving the body of the right thalamus.  There is soft tissue swelling/hematoma overlying the left frontal calvarium.  The osseous structures and remaining extracranial soft tissues are unremarkable.  There are mucous retention cysts or polyps within the bilateral maxillary sinuses with minimal layering aerated secretions within the right maxillary sinus.  There is patchy opacification of the ethmoid sinuses bilaterally.  The remaining visualized paranasal sinuses and mastoid air cells are clear.                Special Treatments/Procedures: None    Disposition:  Home with Home Health    Future Scheduled Appointments:  Future Appointments  Date Time Provider Department Center   3/24/2017 8:00 AM Iggy Telles MD [facility] IM [facility]   3/24/2017 10:30 AM NURSE, [facility] INTERNAL MEDICINE Counts include 234 beds at the [facility] PC[facility]   3/29/2017 12:40 PM [facility] CT6 MOBILE LIMIT 450 LBS [facility] CT SCAN [facility]   3/29/2017 1:40 PM YAW Daigle [facility] NEUROS7 [facility]       Follow-up Plans from This Hospitalization:  He was instructed to follow up with Dr. Iggy Telles on 3/22/17 with family medicine for close hospital follow up.    Last CBC/BMP/HgbA1c (if applicable):  Recent Results (from the past 336 hour(s))   CBC auto differential    Collection Time: 03/21/17  3:59 AM   Result Value Ref Range    WBC 7.66 3.90 - 12.70 K/uL    Hemoglobin 12.7 (L) 14.0 - 18.0 g/dL    Hematocrit 36.8 (L) 40.0 - 54.0 %    Platelets 216 150 - 350 K/uL   CBC auto differential    Collection Time: 03/20/17  4:35 AM   Result Value Ref Range    WBC 8.00 3.90 - 12.70 K/uL    Hemoglobin 13.3 (L) 14.0 - 18.0 g/dL    Hematocrit 39.2 (L) 40.0 - 54.0 %    Platelets 168 150 - 350 K/uL   CBC auto differential    Collection Time: 03/19/17  5:02 AM   Result Value Ref Range    WBC 7.30 3.90 - 12.70 K/uL    Hemoglobin 12.4 (L) 14.0 - 18.0 g/dL    Hematocrit 37.0 (L) 40.0 - 54.0 %    Platelets 187 150 - 350 K/uL     No results  "found for this or any previous visit (from the past 336 hour(s)).  Lab Results   Component Value Date    HGBA1C 6.0 03/11/2017       Discharge Medication List:     Medication List      START taking these medications          aspirin 81 MG Chew   Take 1 tablet (81 mg total) by mouth once daily. Start taking this on the 3/27         CONTINUE taking these medications          escitalopram oxalate 20 MG tablet   Commonly known as:  LEXAPRO   Take 1 tablet (20 mg total) by mouth once daily.       latanoprost 0.005 % ophthalmic solution       metoprolol succinate 25 MG 24 hr tablet   Commonly known as:  TOPROL-XL   Take 1 tablet (25 mg total) by mouth once daily.       timolol maleate 0.5% 0.5 % Drop   Commonly known as:  TIMOPTIC       tramadol 50 mg tablet   Commonly known as:  ULTRAM         STOP taking these medications          apixaban 5 mg Tab            Where to Get Your Medications      You can get these medications from any pharmacy     You don't need a prescription for these medications     aspirin 81 MG Chew             Patient Instructions:    Discharge Procedure Orders  HOSPITAL BED FOR HOME USE   Order Specific Question Answer Comments   Type: Electric    Length of need (1-99 months): 99    Does patient have medical equipment at home? cane, straight    Does patient have medical equipment at home? walker, rolling    Height: 5' 9" (1.753 m)    Weight: 76 kg (167 lb 8.8 oz)    Accessories: Gel overlay mattress    Please check all that apply: Patient requires positioning of the body in ways not feasible in an ordinary bed due to a medical condition which is expected to last at least one month.    Vendor: [facility] to deliver, orders given to    Expected Date of Delivery: 3/16/2017      Ambulatory referral to Outpatient Case Management   Referral Priority: Routine Referral Type: Consultation   Referral Reason: Specialty Services Required    Number of Visits Requested: 1          Signing Physician:  " No Hal Goetz MD